# Patient Record
Sex: FEMALE | Race: WHITE | NOT HISPANIC OR LATINO | Employment: FULL TIME | URBAN - METROPOLITAN AREA
[De-identification: names, ages, dates, MRNs, and addresses within clinical notes are randomized per-mention and may not be internally consistent; named-entity substitution may affect disease eponyms.]

---

## 2023-12-16 ENCOUNTER — APPOINTMENT (OUTPATIENT)
Dept: OTHER | Facility: HOSPITAL | Age: 63
End: 2023-12-16
Payer: COMMERCIAL

## 2023-12-16 ENCOUNTER — APPOINTMENT (OUTPATIENT)
Dept: GENERAL RADIOLOGY | Facility: HOSPITAL | Age: 63
End: 2023-12-16
Payer: COMMERCIAL

## 2023-12-16 ENCOUNTER — HOSPITAL ENCOUNTER (INPATIENT)
Facility: HOSPITAL | Age: 63
LOS: 10 days | Discharge: HOME | End: 2023-12-26
Attending: STUDENT IN AN ORGANIZED HEALTH CARE EDUCATION/TRAINING PROGRAM | Admitting: STUDENT IN AN ORGANIZED HEALTH CARE EDUCATION/TRAINING PROGRAM
Payer: COMMERCIAL

## 2023-12-16 DIAGNOSIS — K57.80 PERFORATED DIVERTICULUM: Primary | ICD-10-CM

## 2023-12-16 DIAGNOSIS — E28.9 OVARIAN DYSFUNCTION, UNSPECIFIED: ICD-10-CM

## 2023-12-16 PROBLEM — N94.89 TUBO-OVARIAN MASS: Status: ACTIVE | Noted: 2023-12-16

## 2023-12-16 PROBLEM — E07.9 DISEASE OF THYROID GLAND: Status: ACTIVE | Noted: 2023-12-16

## 2023-12-16 PROBLEM — F39 MOOD DISORDER: Status: ACTIVE | Noted: 2023-12-16

## 2023-12-16 PROBLEM — I10 HYPERTENSION: Status: ACTIVE | Noted: 2023-12-16

## 2023-12-16 LAB
ABO GROUP BLD: NORMAL
ALBUMIN SERPL-MCNC: 3 G/DL (ref 3.5–5.2)
ALBUMIN/GLOB SERPL: 1 G/DL
ALP SERPL-CCNC: 107 U/L (ref 39–117)
ALT SERPL W P-5'-P-CCNC: 8 U/L (ref 1–33)
ANION GAP SERPL CALCULATED.3IONS-SCNC: 10 MMOL/L (ref 5–15)
AST SERPL-CCNC: 12 U/L (ref 1–32)
BACTERIA UR QL AUTO: ABNORMAL /HPF
BASOPHILS # BLD AUTO: 0.1 10*3/MM3 (ref 0–0.2)
BASOPHILS NFR BLD AUTO: 0.3 % (ref 0–1.5)
BILIRUB SERPL-MCNC: 0.4 MG/DL (ref 0–1.2)
BILIRUB UR QL STRIP: NEGATIVE
BLD GP AB SCN SERPL QL: NEGATIVE
BUN SERPL-MCNC: 10 MG/DL (ref 8–23)
BUN/CREAT SERPL: 14.7 (ref 7–25)
C TRACH RRNA CVX QL NAA+PROBE: NOT DETECTED
CALCIUM SPEC-SCNC: 8.8 MG/DL (ref 8.6–10.5)
CHLORIDE SERPL-SCNC: 99 MMOL/L (ref 98–107)
CLARITY UR: ABNORMAL
CO2 SERPL-SCNC: 28 MMOL/L (ref 22–29)
COLOR UR: YELLOW
CREAT SERPL-MCNC: 0.68 MG/DL (ref 0.57–1)
DEPRECATED RDW RBC AUTO: 47.7 FL (ref 37–54)
EGFRCR SERPLBLD CKD-EPI 2021: 98 ML/MIN/1.73
EOSINOPHIL # BLD AUTO: 0.1 10*3/MM3 (ref 0–0.4)
EOSINOPHIL NFR BLD AUTO: 0.5 % (ref 0.3–6.2)
ERYTHROCYTE [DISTWIDTH] IN BLOOD BY AUTOMATED COUNT: 14.4 % (ref 12.3–15.4)
GLOBULIN UR ELPH-MCNC: 3.1 GM/DL
GLUCOSE SERPL-MCNC: 83 MG/DL (ref 65–99)
GLUCOSE UR STRIP-MCNC: NEGATIVE MG/DL
HCT VFR BLD AUTO: 24 % (ref 34–46.6)
HGB BLD-MCNC: 7.9 G/DL (ref 12–15.9)
HGB UR QL STRIP.AUTO: ABNORMAL
HYALINE CASTS UR QL AUTO: ABNORMAL /LPF
KETONES UR QL STRIP: ABNORMAL
LEUKOCYTE ESTERASE UR QL STRIP.AUTO: ABNORMAL
LYMPHOCYTES # BLD AUTO: 1.8 10*3/MM3 (ref 0.7–3.1)
LYMPHOCYTES NFR BLD AUTO: 8.1 % (ref 19.6–45.3)
MCH RBC QN AUTO: 31 PG (ref 26.6–33)
MCHC RBC AUTO-ENTMCNC: 33 G/DL (ref 31.5–35.7)
MCV RBC AUTO: 94.1 FL (ref 79–97)
MONOCYTES # BLD AUTO: 1.8 10*3/MM3 (ref 0.1–0.9)
MONOCYTES NFR BLD AUTO: 8 % (ref 5–12)
N GONORRHOEA RRNA SPEC QL NAA+PROBE: NOT DETECTED
NEUTROPHILS NFR BLD AUTO: 18.7 10*3/MM3 (ref 1.7–7)
NEUTROPHILS NFR BLD AUTO: 83.1 % (ref 42.7–76)
NITRITE UR QL STRIP: NEGATIVE
NRBC BLD AUTO-RTO: 0 /100 WBC (ref 0–0.2)
PH UR STRIP.AUTO: 6 [PH] (ref 5–8)
PLATELET # BLD AUTO: 367 10*3/MM3 (ref 140–450)
PMV BLD AUTO: 6.6 FL (ref 6–12)
POTASSIUM SERPL-SCNC: 3.7 MMOL/L (ref 3.5–5.2)
PROT SERPL-MCNC: 6.1 G/DL (ref 6–8.5)
PROT UR QL STRIP: ABNORMAL
RBC # BLD AUTO: 2.55 10*6/MM3 (ref 3.77–5.28)
RBC # UR STRIP: ABNORMAL /HPF
REF LAB TEST METHOD: ABNORMAL
RH BLD: POSITIVE
SODIUM SERPL-SCNC: 137 MMOL/L (ref 136–145)
SP GR UR STRIP: 1.03 (ref 1–1.03)
SQUAMOUS #/AREA URNS HPF: ABNORMAL /HPF
T&S EXPIRATION DATE: NORMAL
TRICHOMONAS VAGINALIS PCR: NOT DETECTED
UROBILINOGEN UR QL STRIP: ABNORMAL
WBC # UR STRIP: ABNORMAL /HPF
WBC NRBC COR # BLD AUTO: 22.5 10*3/MM3 (ref 3.4–10.8)

## 2023-12-16 PROCEDURE — 99222 1ST HOSP IP/OBS MODERATE 55: CPT | Performed by: STUDENT IN AN ORGANIZED HEALTH CARE EDUCATION/TRAINING PROGRAM

## 2023-12-16 PROCEDURE — 81001 URINALYSIS AUTO W/SCOPE: CPT | Performed by: STUDENT IN AN ORGANIZED HEALTH CARE EDUCATION/TRAINING PROGRAM

## 2023-12-16 PROCEDURE — 85025 COMPLETE CBC W/AUTO DIFF WBC: CPT | Performed by: STUDENT IN AN ORGANIZED HEALTH CARE EDUCATION/TRAINING PROGRAM

## 2023-12-16 PROCEDURE — 25010000002 HEPARIN (PORCINE) PER 1000 UNITS: Performed by: STUDENT IN AN ORGANIZED HEALTH CARE EDUCATION/TRAINING PROGRAM

## 2023-12-16 PROCEDURE — 86900 BLOOD TYPING SEROLOGIC ABO: CPT

## 2023-12-16 PROCEDURE — 87086 URINE CULTURE/COLONY COUNT: CPT | Performed by: STUDENT IN AN ORGANIZED HEALTH CARE EDUCATION/TRAINING PROGRAM

## 2023-12-16 PROCEDURE — 86901 BLOOD TYPING SEROLOGIC RH(D): CPT

## 2023-12-16 PROCEDURE — 25810000003 LACTATED RINGERS SOLUTION: Performed by: STUDENT IN AN ORGANIZED HEALTH CARE EDUCATION/TRAINING PROGRAM

## 2023-12-16 PROCEDURE — 80053 COMPREHEN METABOLIC PANEL: CPT | Performed by: STUDENT IN AN ORGANIZED HEALTH CARE EDUCATION/TRAINING PROGRAM

## 2023-12-16 PROCEDURE — 86901 BLOOD TYPING SEROLOGIC RH(D): CPT | Performed by: STUDENT IN AN ORGANIZED HEALTH CARE EDUCATION/TRAINING PROGRAM

## 2023-12-16 PROCEDURE — 86850 RBC ANTIBODY SCREEN: CPT | Performed by: STUDENT IN AN ORGANIZED HEALTH CARE EDUCATION/TRAINING PROGRAM

## 2023-12-16 PROCEDURE — 87591 N.GONORRHOEAE DNA AMP PROB: CPT | Performed by: STUDENT IN AN ORGANIZED HEALTH CARE EDUCATION/TRAINING PROGRAM

## 2023-12-16 PROCEDURE — 25810000003 SODIUM CHLORIDE 0.9 % SOLUTION: Performed by: STUDENT IN AN ORGANIZED HEALTH CARE EDUCATION/TRAINING PROGRAM

## 2023-12-16 PROCEDURE — 86900 BLOOD TYPING SEROLOGIC ABO: CPT | Performed by: STUDENT IN AN ORGANIZED HEALTH CARE EDUCATION/TRAINING PROGRAM

## 2023-12-16 PROCEDURE — 25010000002 PIPERACILLIN SOD-TAZOBACTAM PER 1 G: Performed by: STUDENT IN AN ORGANIZED HEALTH CARE EDUCATION/TRAINING PROGRAM

## 2023-12-16 PROCEDURE — 87661 TRICHOMONAS VAGINALIS AMPLIF: CPT | Performed by: STUDENT IN AN ORGANIZED HEALTH CARE EDUCATION/TRAINING PROGRAM

## 2023-12-16 PROCEDURE — 74018 RADEX ABDOMEN 1 VIEW: CPT

## 2023-12-16 PROCEDURE — 87491 CHLMYD TRACH DNA AMP PROBE: CPT | Performed by: STUDENT IN AN ORGANIZED HEALTH CARE EDUCATION/TRAINING PROGRAM

## 2023-12-16 RX ORDER — SODIUM CHLORIDE 0.9 % (FLUSH) 0.9 %
10 SYRINGE (ML) INJECTION AS NEEDED
Status: DISCONTINUED | OUTPATIENT
Start: 2023-12-16 | End: 2023-12-26 | Stop reason: HOSPADM

## 2023-12-16 RX ORDER — ESCITALOPRAM OXALATE 20 MG/1
20 TABLET ORAL DAILY
COMMUNITY

## 2023-12-16 RX ORDER — HEPARIN SODIUM 5000 [USP'U]/ML
5000 INJECTION, SOLUTION INTRAVENOUS; SUBCUTANEOUS EVERY 8 HOURS SCHEDULED
Status: DISCONTINUED | OUTPATIENT
Start: 2023-12-16 | End: 2023-12-17

## 2023-12-16 RX ORDER — SODIUM CHLORIDE 9 MG/ML
40 INJECTION, SOLUTION INTRAVENOUS AS NEEDED
Status: DISCONTINUED | OUTPATIENT
Start: 2023-12-16 | End: 2023-12-22

## 2023-12-16 RX ORDER — BISACODYL 5 MG/1
5 TABLET, DELAYED RELEASE ORAL DAILY PRN
Status: DISCONTINUED | OUTPATIENT
Start: 2023-12-16 | End: 2023-12-26 | Stop reason: HOSPADM

## 2023-12-16 RX ORDER — LEVOTHYROXINE SODIUM 0.03 MG/1
25 TABLET ORAL
COMMUNITY

## 2023-12-16 RX ORDER — LEVOTHYROXINE SODIUM 0.03 MG/1
25 TABLET ORAL
Status: DISCONTINUED | OUTPATIENT
Start: 2023-12-16 | End: 2023-12-26 | Stop reason: HOSPADM

## 2023-12-16 RX ORDER — NITROGLYCERIN 0.4 MG/1
0.4 TABLET SUBLINGUAL
Status: DISCONTINUED | OUTPATIENT
Start: 2023-12-16 | End: 2023-12-26 | Stop reason: HOSPADM

## 2023-12-16 RX ORDER — ONDANSETRON 2 MG/ML
4 INJECTION INTRAMUSCULAR; INTRAVENOUS EVERY 6 HOURS PRN
Status: DISCONTINUED | OUTPATIENT
Start: 2023-12-16 | End: 2023-12-26 | Stop reason: HOSPADM

## 2023-12-16 RX ORDER — BISACODYL 10 MG
10 SUPPOSITORY, RECTAL RECTAL DAILY PRN
Status: DISCONTINUED | OUTPATIENT
Start: 2023-12-16 | End: 2023-12-23

## 2023-12-16 RX ORDER — SODIUM CHLORIDE 9 MG/ML
125 INJECTION, SOLUTION INTRAVENOUS CONTINUOUS
Status: DISCONTINUED | OUTPATIENT
Start: 2023-12-16 | End: 2023-12-18

## 2023-12-16 RX ORDER — ONDANSETRON 4 MG/1
4 TABLET, FILM COATED ORAL EVERY 6 HOURS PRN
Status: DISCONTINUED | OUTPATIENT
Start: 2023-12-16 | End: 2023-12-26 | Stop reason: HOSPADM

## 2023-12-16 RX ORDER — LISINOPRIL 10 MG/1
10 TABLET ORAL DAILY
COMMUNITY

## 2023-12-16 RX ORDER — AMOXICILLIN 250 MG
2 CAPSULE ORAL 2 TIMES DAILY
Status: DISCONTINUED | OUTPATIENT
Start: 2023-12-16 | End: 2023-12-26 | Stop reason: HOSPADM

## 2023-12-16 RX ORDER — PANTOPRAZOLE SODIUM 40 MG/10ML
40 INJECTION, POWDER, LYOPHILIZED, FOR SOLUTION INTRAVENOUS
Status: DISCONTINUED | OUTPATIENT
Start: 2023-12-16 | End: 2023-12-18

## 2023-12-16 RX ORDER — LISINOPRIL 5 MG/1
10 TABLET ORAL DAILY
Status: DISCONTINUED | OUTPATIENT
Start: 2023-12-16 | End: 2023-12-26 | Stop reason: HOSPADM

## 2023-12-16 RX ORDER — POLYETHYLENE GLYCOL 3350 17 G/17G
17 POWDER, FOR SOLUTION ORAL DAILY PRN
Status: DISCONTINUED | OUTPATIENT
Start: 2023-12-16 | End: 2023-12-26 | Stop reason: HOSPADM

## 2023-12-16 RX ORDER — ACETAMINOPHEN 325 MG/1
650 TABLET ORAL EVERY 4 HOURS PRN
Status: DISCONTINUED | OUTPATIENT
Start: 2023-12-16 | End: 2023-12-26 | Stop reason: HOSPADM

## 2023-12-16 RX ORDER — OXYCODONE HYDROCHLORIDE 5 MG/1
5 TABLET ORAL EVERY 4 HOURS PRN
Status: DISCONTINUED | OUTPATIENT
Start: 2023-12-16 | End: 2023-12-22

## 2023-12-16 RX ORDER — KETOROLAC TROMETHAMINE 30 MG/ML
15 INJECTION, SOLUTION INTRAMUSCULAR; INTRAVENOUS EVERY 6 HOURS PRN
Status: ACTIVE | OUTPATIENT
Start: 2023-12-16 | End: 2023-12-21

## 2023-12-16 RX ORDER — SODIUM CHLORIDE 0.9 % (FLUSH) 0.9 %
10 SYRINGE (ML) INJECTION EVERY 12 HOURS SCHEDULED
Status: DISCONTINUED | OUTPATIENT
Start: 2023-12-16 | End: 2023-12-26 | Stop reason: HOSPADM

## 2023-12-16 RX ORDER — ESCITALOPRAM OXALATE 10 MG/1
20 TABLET ORAL DAILY
Status: DISCONTINUED | OUTPATIENT
Start: 2023-12-16 | End: 2023-12-26 | Stop reason: HOSPADM

## 2023-12-16 RX ADMIN — SODIUM CHLORIDE 100 ML/HR: 9 INJECTION, SOLUTION INTRAVENOUS at 02:01

## 2023-12-16 RX ADMIN — SENNOSIDES AND DOCUSATE SODIUM 2 TABLET: 8.6; 5 TABLET ORAL at 21:50

## 2023-12-16 RX ADMIN — Medication 10 ML: at 02:02

## 2023-12-16 RX ADMIN — PIPERACILLIN AND TAZOBACTAM 3.38 G: 3; .375 INJECTION, POWDER, FOR SOLUTION INTRAVENOUS at 08:44

## 2023-12-16 RX ADMIN — PIPERACILLIN AND TAZOBACTAM 3.38 G: 3; .375 INJECTION, POWDER, FOR SOLUTION INTRAVENOUS at 02:02

## 2023-12-16 RX ADMIN — PANTOPRAZOLE SODIUM 40 MG: 40 INJECTION, POWDER, FOR SOLUTION INTRAVENOUS at 05:43

## 2023-12-16 RX ADMIN — SODIUM CHLORIDE, POTASSIUM CHLORIDE, SODIUM LACTATE AND CALCIUM CHLORIDE 1000 ML: 600; 310; 30; 20 INJECTION, SOLUTION INTRAVENOUS at 09:12

## 2023-12-16 RX ADMIN — LEVOTHYROXINE SODIUM 25 MCG: 25 TABLET ORAL at 06:27

## 2023-12-16 RX ADMIN — HEPARIN SODIUM 5000 UNITS: 5000 INJECTION INTRAVENOUS; SUBCUTANEOUS at 06:27

## 2023-12-16 RX ADMIN — PIPERACILLIN AND TAZOBACTAM 3.38 G: 3; .375 INJECTION, POWDER, FOR SOLUTION INTRAVENOUS at 17:08

## 2023-12-16 RX ADMIN — SENNOSIDES AND DOCUSATE SODIUM 2 TABLET: 8.6; 5 TABLET ORAL at 08:44

## 2023-12-16 RX ADMIN — OXYCODONE HYDROCHLORIDE 5 MG: 5 TABLET ORAL at 02:01

## 2023-12-16 RX ADMIN — OXYCODONE HYDROCHLORIDE 5 MG: 5 TABLET ORAL at 12:33

## 2023-12-16 RX ADMIN — Medication 10 ML: at 21:54

## 2023-12-16 RX ADMIN — SODIUM CHLORIDE 125 ML/HR: 9 INJECTION, SOLUTION INTRAVENOUS at 21:54

## 2023-12-16 RX ADMIN — PIPERACILLIN AND TAZOBACTAM 3.38 G: 3; .375 INJECTION, POWDER, FOR SOLUTION INTRAVENOUS at 23:51

## 2023-12-16 RX ADMIN — HEPARIN SODIUM 5000 UNITS: 5000 INJECTION INTRAVENOUS; SUBCUTANEOUS at 21:50

## 2023-12-16 RX ADMIN — ESCITALOPRAM OXALATE 20 MG: 10 TABLET ORAL at 08:44

## 2023-12-16 RX ADMIN — HEPARIN SODIUM 5000 UNITS: 5000 INJECTION INTRAVENOUS; SUBCUTANEOUS at 17:08

## 2023-12-16 NOTE — PLAN OF CARE
Problem: Adult Inpatient Plan of Care  Goal: Plan of Care Review  Outcome: Ongoing, Progressing  Goal: Patient-Specific Goal (Individualized)  Outcome: Ongoing, Progressing  Goal: Absence of Hospital-Acquired Illness or Injury  Outcome: Ongoing, Progressing  Goal: Optimal Comfort and Wellbeing  Outcome: Ongoing, Progressing  Goal: Readiness for Transition of Care  Outcome: Ongoing, Progressing  Intervention: Mutually Develop Transition Plan  Recent Flowsheet Documentation  Taken 12/16/2023 0027 by Belinda Jacinto RN  Transportation Anticipated: family or friend will provide  Patient/Family Anticipated Services at Transition: none  Patient/Family Anticipates Transition to: home with family  Taken 12/16/2023 0026 by Belinda Jacinto, RN  Equipment Currently Used at Home: none   Goal Outcome Evaluation:

## 2023-12-16 NOTE — CONSULTS
Colorectal Surgery Consultation Note    ID:  Charity Mcclure;   : 1960  DATE OF VISIT: 2023    Chief Complaint  No chief complaint on file.       History of Present Illness  Charity Mcclure is a 63 y.o. female who I was asked to see in consultation by Dr Vicente Steele to evaluate for abdominal pain.    Patient states she since her hip replacement two weeks, she has been having abdominal pain. Pain is localized in the left lower quadrant radiating to right lower quadrant. She never had  prior episodes of diverticulitis. Patient denies any urinary symptoms. Patient has not had an episode of complicated diverticulitis requiring IR drainage or hospital admission.     Patient states she has a bowel movement 1-2 per day. It is hard in consistency. Patient has no bleeding with stools; she has pain with bowel movements; she has no itching;  she has no protrusion of tissue while straining.    Patient does not take supplemental fiber.    Last colonoscopy: 13 years ago. No family history of colon or rectal cancer    Upon admission, she had hypotension to systolic of 80. Has not been tachycardic. She has been afebrile. She was noted to have elevated leukocytosis to  22.50. CT, per charts, demonstrates diverticulitis with perforation and abscess formation.      Past Medical History  Past Medical History:   Diagnosis Date    Disease of thyroid gland 2023    Hypertension 2023     Past Surgical History  History reviewed. No pertinent surgical history.  Family History  History reviewed. No pertinent family history.  No colorectal cancer history in immediate family members.  Social History  Social History     Tobacco Use    Smoking status: Every Day     Packs/day: 1     Types: Cigarettes    Smokeless tobacco: Never   Vaping Use    Vaping Use: Never used   Substance Use Topics    Alcohol use: Never    Drug use: Never     For further details please see Health History Questionnaire scanned in Epic.  Medication  List    Current Facility-Administered Medications:     acetaminophen (TYLENOL) tablet 650 mg, 650 mg, Oral, Q4H PRN, Kleber Steele MD    sennosides-docusate (PERICOLACE) 8.6-50 MG per tablet 2 tablet, 2 tablet, Oral, BID **AND** polyethylene glycol (MIRALAX) packet 17 g, 17 g, Oral, Daily PRN **AND** bisacodyl (DULCOLAX) EC tablet 5 mg, 5 mg, Oral, Daily PRN **AND** bisacodyl (DULCOLAX) suppository 10 mg, 10 mg, Rectal, Daily PRN, Kleber Steele MD    Calcium Replacement - Follow Nurse / BPA Driven Protocol, , Does not apply, PRN, Kleber Steele MD    escitalopram (LEXAPRO) tablet 20 mg, 20 mg, Oral, Daily, Kleber Steele MD    heparin (porcine) 5000 UNIT/ML injection 5,000 Units, 5,000 Units, Subcutaneous, Q8H, Kleber Steele MD, 5,000 Units at 12/16/23 0627    HYDROmorphone (DILAUDID) injection 1 mg, 1 mg, Intravenous, Q2H PRN, Kleber Steele MD    levothyroxine (SYNTHROID, LEVOTHROID) tablet 25 mcg, 25 mcg, Oral, Q AM, Kleber Steele MD, 25 mcg at 12/16/23 0627    lisinopril (PRINIVIL,ZESTRIL) tablet 10 mg, 10 mg, Oral, Daily, Kleber Steele MD    Magnesium Standard Dose Replacement - Follow Nurse / BPA Driven Protocol, , Does not apply, PRN, Kleber Steele MD    nitroglycerin (NITROSTAT) SL tablet 0.4 mg, 0.4 mg, Sublingual, Q5 Min PRN, Kleber Steele MD    ondansetron (ZOFRAN) tablet 4 mg, 4 mg, Oral, Q6H PRN **OR** ondansetron (ZOFRAN) injection 4 mg, 4 mg, Intravenous, Q6H PRN, Kleber Steele MD    oxyCODONE (ROXICODONE) immediate release tablet 5 mg, 5 mg, Oral, Q4H PRN, Kleber Steele MD, 5 mg at 12/16/23 0201    pantoprazole (PROTONIX) injection 40 mg, 40 mg, Intravenous, Q AM, Kleber Steele MD, 40 mg at 12/16/23 0543    Phosphorus Replacement - Follow Nurse / BPA Driven Protocol, , Does not apply, Ivette CASTILLO Stuart, MD    piperacillin-tazobactam (ZOSYN) IVPB 3.375 g in 100 mL NS (CD), 3.375 g, Intravenous, Q8H, Kleber Steele MD    Potassium Replacement - Follow Nurse / BPA Driven Protocol, , Does not apply, Ivette CASTILLO Stuart, MD    sodium chloride  0.9 % flush 10 mL, 10 mL, Intravenous, Q12H, Kleber Steele MD, 10 mL at 12/16/23 0202    sodium chloride 0.9 % flush 10 mL, 10 mL, Intravenous, PRN, Kleber Steele MD    sodium chloride 0.9 % infusion 40 mL, 40 mL, Intravenous, PRN, Kleber Steele MD    sodium chloride 0.9 % infusion, 100 mL/hr, Intravenous, Continuous, Kleber Steele MD, Last Rate: 100 mL/hr at 12/16/23 0201, 100 mL/hr at 12/16/23 0201   Allergies  No Known Allergies  Review of Systems  All systems reviewed and are otherwise negative, pertinent positives noted in the HPI and Health History Questionnaire scanned in Epic.    Physical Exam  General:  No acute distress  Head: Normocephalic, atraumatic  Neuro: Alert and oriented    Abdomen:  Soft, moderate tenderness in the low abdomen especially on the left side, no diffuse peritoneal signs, non-distended, no masses, no hernias, no hepatomegaly, no splenomegaly. No abnormal, audible bowel sounds.      Assessment  - 62 yo with hypotensive, diverticulitis and abscess collection     Plan / Recommendations    1 unable to review her CT scan from outside facility, awaiting images to be uploaded. Currently hypotensive w/o tachycardia. Non sick appearing.     2. 1L bolus given. Unsure if this due to anemia vs sepsis. Reports no blood with BM. Possible from her recent orthopedic surgery. Currently she not on anticoagulation. I have sent type and scree for possible need for transfusion.    3. I have obtained A-xray, no free air     4. No diffuse peritonitis.Responsive to fluids.Continue with serial abdominal exam, hold off on emergent surgical intervention unless clinically worsens    5. Continue with IV antibiotics    6 ok for clear liquid diet       7. I have also personally reviewed her laboratory that is remarkable for anemia with Hgb of 7.9 and elevated WBC to 22.     I have personally reviewed all physician notes related to this patient which can be summarized as patient having diverticulitis with an abscess .    8.  I will continue to f/u along       Harvinder Vasquez MD   Colon and Rectal Surgery   [facility]

## 2023-12-16 NOTE — PLAN OF CARE
Problem: Adult Inpatient Plan of Care  Goal: Plan of Care Review  12/16/2023 0605 by Belinda Jacinto RN  Outcome: Ongoing, Progressing  12/16/2023 0508 by Belinda Jacinto RN  Outcome: Ongoing, Progressing  Goal: Patient-Specific Goal (Individualized)  12/16/2023 0605 by Belinda Jacinto RN  Outcome: Ongoing, Progressing  12/16/2023 0508 by Belinda Jacinto RN  Outcome: Ongoing, Progressing  Goal: Absence of Hospital-Acquired Illness or Injury  12/16/2023 0605 by Belinda Jacinto RN  Outcome: Ongoing, Progressing  12/16/2023 0508 by Belinda Jacinto RN  Outcome: Ongoing, Progressing  Intervention: Identify and Manage Fall Risk  Recent Flowsheet Documentation  Taken 12/16/2023 0510 by Belinda Jacinto RN  Safety Promotion/Fall Prevention:   activity supervised   clutter free environment maintained   fall prevention program maintained   lighting adjusted   room organization consistent   safety round/check completed  Taken 12/16/2023 0400 by Belinda Jacinto RN  Safety Promotion/Fall Prevention:   clutter free environment maintained   fall prevention program maintained   lighting adjusted   room organization consistent   safety round/check completed  Intervention: Prevent Infection  Recent Flowsheet Documentation  Taken 12/16/2023 0510 by Belinda Jacinto RN  Infection Prevention:   hand hygiene promoted   rest/sleep promoted  Taken 12/16/2023 0400 by Belinda Jacinto RN  Infection Prevention:   hand hygiene promoted   rest/sleep promoted  Goal: Optimal Comfort and Wellbeing  12/16/2023 0605 by Belinda Jacinto RN  Outcome: Ongoing, Progressing  12/16/2023 0508 by Belinda Jacinto RN  Outcome: Ongoing, Progressing  Goal: Readiness for Transition of Care  12/16/2023 0605 by Belinda Jacinto RN  Outcome: Ongoing, Progressing  12/16/2023 0508 by Belinda Jacinto RN  Outcome: Ongoing, Progressing  Intervention: Mutually Develop Transition Plan  Recent Flowsheet Documentation  Taken 12/16/2023 0027 by  Belinda Jacinto, RN  Transportation Anticipated: family or friend will provide  Patient/Family Anticipated Services at Transition: none  Patient/Family Anticipates Transition to: home with family  Taken 12/16/2023 0026 by Belinda Jacinto, RN  Equipment Currently Used at Home: none   Goal Outcome Evaluation:

## 2023-12-16 NOTE — CONSULTS
Reason for Consultation: [facility]    Patient Care Team:  Meron Whittaker MD as PCP - General (Internal Medicine)    Chief complaint lower abdominal pain    Subjective .     History of present illness:  62 yo  who presents from [place] [facility] with diverticulitis and abscess collection. X-ray here shows no free air. Also mentioned on CT from [place] was a possible left ovarian or tubo-ovarian abscess.    Objective     Vital Signs   Vitals:    23 0850 23 1035 23 1226 23 1239   BP: (!) 85/58 108/68 101/69 101/63   BP Location: Right arm   Right arm   Patient Position: Lying   Lying   Pulse: 81 78 77 82   Resp: 22 18 20 14   Temp: 98.3 °F (36.8 °C)   99.4 °F (37.4 °C)   TempSrc: Oral   Axillary   SpO2: 95% 93% 95% 97%   Weight:       Height:         Temp (24hrs), Av.5 °F (36.9 °C), Min:98 °F (36.7 °C), Max:99.4 °F (37.4 °C)      Physical Exam:       General Appearance:    Alert, cooperative, in no acute distress   Head:    Normocephalic, without obvious abnormality, atraumatic   Eyes:         PERRLA   Abdomen:     Soft, non-distended, mild tenderness lower abdomen     Lab Results:  Lab Results (last 48 hours)       Procedure Component Value Units Date/Time    Chlamydia trachomatis, Neisseria gonorrhoeae, Trichomonas vaginalis, PCR - Urine, Urine, Clean Catch [[id number]]  (Normal) Collected: 23 0548    Specimen: Urine, Clean Catch Updated: 23 08     Chlamydia DNA by PCR Not Detected     Neisseria gonorrhoeae by PCR Not Detected     Trichomonas vaginalis PCR Not Detected    Urinalysis, Microscopic Only - Urine, Clean Catch [[id number]]  (Abnormal) Collected: 23 0549    Specimen: Urine, Clean Catch Updated: 23 0702     RBC, UA 3-5 /HPF      WBC, UA 21-50 /HPF      Bacteria, UA 2+ /HPF      Squamous Epithelial Cells, UA 3-6 /HPF      Hyaline Casts, UA None Seen /LPF      Methodology Manual Light Microscopy    Urine Culture - Urine, Urine, Clean Catch [[id number]] Collected:  12/16/23 0549    Specimen: Urine, Clean Catch Updated: 12/16/23 0702    Urinalysis With Culture If Indicated - Urine, Clean Catch [[id number]]  (Abnormal) Collected: 12/16/23 0549    Specimen: Urine, Clean Catch Updated: 12/16/23 0643     Color, UA Yellow     Appearance, UA Cloudy     pH, UA 6.0     Specific Gravity, UA 1.032     Glucose, UA Negative     Ketones, UA Trace     Bilirubin, UA Negative     Blood, UA Small (1+)     Protein, UA Trace     Leuk Esterase, UA Small (1+)     Nitrite, UA Negative     Urobilinogen, UA 1.0 E.U./dL    Narrative:      In absence of clinical symptoms, the presence of pyuria, bacteria, and/or nitrites on the urinalysis result does not correlate with infection.    Comprehensive Metabolic Panel [[id number]]  (Abnormal) Collected: 12/16/23 0525    Specimen: Blood Updated: 12/16/23 0605     Glucose 83 mg/dL      BUN 10 mg/dL      Creatinine 0.68 mg/dL      Sodium 137 mmol/L      Potassium 3.7 mmol/L      Chloride 99 mmol/L      CO2 28.0 mmol/L      Calcium 8.8 mg/dL      Total Protein 6.1 g/dL      Albumin 3.0 g/dL      ALT (SGPT) 8 U/L      AST (SGOT) 12 U/L      Alkaline Phosphatase 107 U/L      Total Bilirubin 0.4 mg/dL      Globulin 3.1 gm/dL      A/G Ratio 1.0 g/dL      BUN/Creatinine Ratio 14.7     Anion Gap 10.0 mmol/L      eGFR 98.0 mL/min/1.73     Narrative:      GFR Normal >60  Chronic Kidney Disease <60  Kidney Failure <15      CBC Auto Differential [[id number]]  (Abnormal) Collected: 12/16/23 0525    Specimen: Blood Updated: 12/16/23 0536     WBC 22.50 10*3/mm3      RBC 2.55 10*6/mm3      Hemoglobin 7.9 g/dL      Hematocrit 24.0 %      MCV 94.1 fL      MCH 31.0 pg      MCHC 33.0 g/dL      RDW 14.4 %      RDW-SD 47.7 fl      MPV 6.6 fL      Platelets 367 10*3/mm3      Neutrophil % 83.1 %      Lymphocyte % 8.1 %      Monocyte % 8.0 %      Eosinophil % 0.5 %      Basophil % 0.3 %      Neutrophils, Absolute 18.70 10*3/mm3      Lymphocytes, Absolute 1.80 10*3/mm3      Monocytes,  Absolute 1.80 10*3/mm3      Eosinophils, Absolute 0.10 10*3/mm3      Basophils, Absolute 0.10 10*3/mm3      nRBC 0.0 /100 WBC             Radiology Results:  Imaging Results (Last 72 Hours)       Procedure Component Value Units Date/Time    XR Abdomen KUB [[id number]] Collected: 12/16/23 0948     Updated: 12/16/23 0952    Narrative:      XR ABDOMEN KUB    Date of Exam: 12/16/2023 8:43 AM CST    Indication: hypotensive    Comparison: None available.    Findings:  Upright radiograph obtained of the abdomen demonstrates a few nonspecific gas fluid levels in the right upper quadrant. No significant small bowel distention noted. Gas is present within the colon. No free intraperitoneal gas noted. The lung bases are   clear. No calcifications overlying the expected location of the kidneys. Right total hip arthroplasty is noted.      Impression:      Impression:  No acute process identified within the abdomen.      Electronically Signed: Tali Mercedes MD    12/16/2023 8:50 AM CST    Workstation ID: [id number]    CT Outside Films [[id number]] Resulted: 12/16/23 0849     Updated: 12/16/23 0849    Narrative:      This procedure was auto-finalized with no dictation required.            Assessment & Plan     Principal Problem:    Perforated diverticulum  Active Problems:    Mood disorder    Hypertension    Disease of thyroid gland    Tubo-ovarian mass      Currently on abx. Will follow. If surgery is indicated, I am available to evaluate possible TOA if needed.    I discussed the patients findings and my recommendations with patient    Suzette Robert MD  12/16/23  16:23 EST

## 2023-12-16 NOTE — H&P
[facility] Medicine Services  History & Physical    Patient Name: Charity Mcclure  : 1960  MRN: [id number]  Primary Care Physician:  Meron Whittaker MD  Date of admission: 2023  Date and Time of Service: 2023 at 0100    Subjective      Chief Complaint: Abdominal Pain    History of Present Illness: Charity Mcclure is a 63 y.o. female with a CMH of HTN, hypothyroidism, right hip repair who presented to [facility] on 2023 with abdominal pain.    Patient states starting last week, she started having abdominal pain, fevers, chills, myalgias.  She initially thought she had the flu, but then she started developing diarrhea about 2-3 days ago, and then she has now started having issues with PO intake.  She presented to [facility] ED where they scanned her abdomen and found she had a perforated diverticulum.  They also found a tubo-ovarian mass, which they suspected was an abscess.  Given these findings, gynecology at [facility] was consulted and they felt that if colorectal would be on board, that it would be possible to manage this patient here.  [place] did agree to be consulted, so transfer was accepted.      Review of Systems   Constitutional:  Negative for chills and fever.   HENT:  Negative for congestion and rhinorrhea.    Eyes:  Negative for visual disturbance.   Respiratory:  Negative for shortness of breath.    Cardiovascular:  Negative for chest pain.   Gastrointestinal:  Positive for abdominal pain, diarrhea and nausea. Negative for vomiting.   Endocrine: Negative for polyuria.   Genitourinary:  Negative for difficulty urinating and dyspareunia.   Musculoskeletal:  Negative for back pain and myalgias.   Skin:  Negative for rash and wound.   Neurological:  Negative for weakness, numbness and headaches.   Psychiatric/Behavioral:  Negative for agitation, behavioral problems and confusion.        Personal History     Past Medical History:   Diagnosis Date     Disease of thyroid gland 12/16/2023    Hypertension 12/16/2023       History reviewed. No pertinent surgical history.    Family History: family history is not on file. Otherwise pertinent FHx was reviewed and not pertinent to current issue.    Social History:  reports that she has been smoking cigarettes. She has been smoking an average of 1 pack per day. She has never used smokeless tobacco. She reports that she does not drink alcohol and does not use drugs.    Home Medications:  Prior to Admission Medications       Prescriptions Last Dose Informant Patient Reported? Taking?    escitalopram (LEXAPRO) 20 MG tablet Unknown  Yes No    Take 1 tablet by mouth Daily.    levothyroxine (SYNTHROID, LEVOTHROID) 25 MCG tablet Unknown  Yes No    Take 1 tablet by mouth Every Morning.    lisinopril (PRINIVIL,ZESTRIL) 10 MG tablet Unknown  Yes No    Take 1 tablet by mouth Daily.    rivaroxaban (XARELTO) 10 MG tablet Unknown  Yes No    Take 1 tablet by mouth Daily.              Allergies:  No Known Allergies    Objective      Vitals:   Temp:  [98 °F (36.7 °C)] 98 °F (36.7 °C)  Heart Rate:  [76] 76  Resp:  [16] 16  BP: (112)/(68) 112/68  Body mass index is 29.24 kg/m².  Physical Exam  Constitutional:       General: She is not in acute distress.  HENT:      Head: Normocephalic and atraumatic.   Cardiovascular:      Rate and Rhythm: Normal rate and regular rhythm.      Heart sounds: Normal heart sounds.   Pulmonary:      Effort: Pulmonary effort is normal. No respiratory distress.      Breath sounds: Normal breath sounds.   Abdominal:      General: Bowel sounds are normal.      Palpations: Abdomen is soft.      Tenderness: There is abdominal tenderness. There is no rebound.   Musculoskeletal:      Right lower leg: No edema.      Left lower leg: No edema.   Skin:     General: Skin is warm and dry.   Neurological:      Mental Status: She is alert.   Psychiatric:         Mood and Affect: Mood normal.         Behavior: Behavior normal.          Diagnostic Data:  Lab Results (last 24 hours)       ** No results found for the last 24 hours. **             Imaging Results (Last 24 Hours)       ** No results found for the last 24 hours. **              Assessment & Plan        This is a 63 y.o. female with:    Active and Resolved Problems  Active Hospital Problems    Diagnosis  POA    **Perforated diverticulum [K57.80]  Yes    Mood disorder [F39]  Yes    Hypertension [I10]  Yes    Disease of thyroid gland [E07.9]  Yes    Tubo-ovarian mass [N94.89]  Yes      Resolved Hospital Problems   No resolved problems to display.       #Diverticulitis with perforation  From review of the chart, it looks like the patient had recent surgery for her hip and was started on a course of xarelto for DVT ppx.  It may be possible, that this contributed to the perforation, as the timeline does fit.  Overall, patient does not exhibit sepsis criteria.  It is possible that the contents of the perforated diverticulum could have caused a tuboovarian abscess.  - Zosyn IV for now  - Pain control, monitor for toxicity  - Colorectal surgery consulted    #Tuboovarian Mass  The best case scenario is the above.  Other possible etiologies would be STI leading to abscess.  Also possible this could be a coincidental finding of a malignancy.  Was hesitant to take this patient given that malignancy was on the differential, as gyn-onc is not available at our facility, but accepted per hospital policy.  - STD panel  - UA  - Gynecology c/s  - Abx as above    #HTN  HDS.  - Continue home meds    #Hypothyrodism  #Mood Disorder  Stable.  - Continue home meds    DVT prophylaxis:  Medical DVT prophylaxis orders are present.    The patient desires to be as follows:    CODE STATUS:    Level Of Support Discussed With: Patient  Code Status (Patient has no pulse and is not breathing): CPR (Attempt to Resuscitate)  Medical Interventions (Patient has pulse or is breathing): Full Support        Ben  Ren, who can be contacted at [phone], is the designated person to make medical decisions on the patient's behalf if She is incapable of doing so. This was clarified with patient and/or next of kin on 12/16/2023 during the course of this H&P.    Admission Status:  I believe this patient meets inpatient status.    Expected Length of Stay: 3 nights    PDMP and Medication Dispenses via Sidebar reviewed and consistent with patient reported medications.    I discussed the patient's findings and my recommendations with patient and family.      Signature:     This document has been electronically signed by Kleebr Steele MD on December 16, 2023 02:53 [facility] Hospitalist Team

## 2023-12-17 LAB
ALBUMIN SERPL-MCNC: 2.7 G/DL (ref 3.5–5.2)
ALBUMIN/GLOB SERPL: 0.8 G/DL
ALP SERPL-CCNC: 137 U/L (ref 39–117)
ALT SERPL W P-5'-P-CCNC: 11 U/L (ref 1–33)
ANION GAP SERPL CALCULATED.3IONS-SCNC: 13 MMOL/L (ref 5–15)
AST SERPL-CCNC: 18 U/L (ref 1–32)
BACTERIA SPEC AEROBE CULT: NO GROWTH
BASOPHILS # BLD AUTO: 0.1 10*3/MM3 (ref 0–0.2)
BASOPHILS NFR BLD AUTO: 0.3 % (ref 0–1.5)
BILIRUB SERPL-MCNC: 0.4 MG/DL (ref 0–1.2)
BUN SERPL-MCNC: 6 MG/DL (ref 8–23)
BUN/CREAT SERPL: 10 (ref 7–25)
CALCIUM SPEC-SCNC: 8.7 MG/DL (ref 8.6–10.5)
CHLORIDE SERPL-SCNC: 101 MMOL/L (ref 98–107)
CO2 SERPL-SCNC: 25 MMOL/L (ref 22–29)
CREAT SERPL-MCNC: 0.6 MG/DL (ref 0.57–1)
DEPRECATED RDW RBC AUTO: 49.4 FL (ref 37–54)
EGFRCR SERPLBLD CKD-EPI 2021: 101 ML/MIN/1.73
EOSINOPHIL # BLD AUTO: 0.1 10*3/MM3 (ref 0–0.4)
EOSINOPHIL NFR BLD AUTO: 0.4 % (ref 0.3–6.2)
ERYTHROCYTE [DISTWIDTH] IN BLOOD BY AUTOMATED COUNT: 14.5 % (ref 12.3–15.4)
GLOBULIN UR ELPH-MCNC: 3.6 GM/DL
GLUCOSE SERPL-MCNC: 95 MG/DL (ref 65–99)
HCT VFR BLD AUTO: 22.7 % (ref 34–46.6)
HGB BLD-MCNC: 7.4 G/DL (ref 12–15.9)
LYMPHOCYTES # BLD AUTO: 1.9 10*3/MM3 (ref 0.7–3.1)
LYMPHOCYTES NFR BLD AUTO: 10 % (ref 19.6–45.3)
MCH RBC QN AUTO: 30 PG (ref 26.6–33)
MCHC RBC AUTO-ENTMCNC: 32.8 G/DL (ref 31.5–35.7)
MCV RBC AUTO: 91.6 FL (ref 79–97)
MONOCYTES # BLD AUTO: 1.5 10*3/MM3 (ref 0.1–0.9)
MONOCYTES NFR BLD AUTO: 7.6 % (ref 5–12)
NEUTROPHILS NFR BLD AUTO: 15.7 10*3/MM3 (ref 1.7–7)
NEUTROPHILS NFR BLD AUTO: 81.7 % (ref 42.7–76)
NRBC BLD AUTO-RTO: 0.1 /100 WBC (ref 0–0.2)
PLATELET # BLD AUTO: 341 10*3/MM3 (ref 140–450)
PMV BLD AUTO: 7.5 FL (ref 6–12)
POTASSIUM SERPL-SCNC: 3.6 MMOL/L (ref 3.5–5.2)
POTASSIUM SERPL-SCNC: 4.7 MMOL/L (ref 3.5–5.2)
PROT SERPL-MCNC: 6.3 G/DL (ref 6–8.5)
RBC # BLD AUTO: 2.47 10*6/MM3 (ref 3.77–5.28)
SODIUM SERPL-SCNC: 139 MMOL/L (ref 136–145)
WBC NRBC COR # BLD AUTO: 19.2 10*3/MM3 (ref 3.4–10.8)

## 2023-12-17 PROCEDURE — 80053 COMPREHEN METABOLIC PANEL: CPT | Performed by: STUDENT IN AN ORGANIZED HEALTH CARE EDUCATION/TRAINING PROGRAM

## 2023-12-17 PROCEDURE — 25010000002 PIPERACILLIN SOD-TAZOBACTAM PER 1 G: Performed by: STUDENT IN AN ORGANIZED HEALTH CARE EDUCATION/TRAINING PROGRAM

## 2023-12-17 PROCEDURE — 85025 COMPLETE CBC W/AUTO DIFF WBC: CPT | Performed by: STUDENT IN AN ORGANIZED HEALTH CARE EDUCATION/TRAINING PROGRAM

## 2023-12-17 PROCEDURE — 25010000002 HYDROMORPHONE 1 MG/ML SOLUTION: Performed by: STUDENT IN AN ORGANIZED HEALTH CARE EDUCATION/TRAINING PROGRAM

## 2023-12-17 PROCEDURE — 99232 SBSQ HOSP IP/OBS MODERATE 35: CPT | Performed by: STUDENT IN AN ORGANIZED HEALTH CARE EDUCATION/TRAINING PROGRAM

## 2023-12-17 PROCEDURE — 84132 ASSAY OF SERUM POTASSIUM: CPT | Performed by: INTERNAL MEDICINE

## 2023-12-17 RX ORDER — POTASSIUM CHLORIDE 20 MEQ/1
40 TABLET, EXTENDED RELEASE ORAL EVERY 4 HOURS
Status: COMPLETED | OUTPATIENT
Start: 2023-12-17 | End: 2023-12-17

## 2023-12-17 RX ADMIN — SENNOSIDES AND DOCUSATE SODIUM 2 TABLET: 8.6; 5 TABLET ORAL at 08:19

## 2023-12-17 RX ADMIN — ACETAMINOPHEN 650 MG: 325 TABLET, FILM COATED ORAL at 10:00

## 2023-12-17 RX ADMIN — PANTOPRAZOLE SODIUM 40 MG: 40 INJECTION, POWDER, FOR SOLUTION INTRAVENOUS at 05:45

## 2023-12-17 RX ADMIN — HYDROMORPHONE HYDROCHLORIDE 1 MG: 1 INJECTION, SOLUTION INTRAMUSCULAR; INTRAVENOUS; SUBCUTANEOUS at 20:26

## 2023-12-17 RX ADMIN — PIPERACILLIN AND TAZOBACTAM 3.38 G: 3; .375 INJECTION, POWDER, FOR SOLUTION INTRAVENOUS at 17:02

## 2023-12-17 RX ADMIN — Medication 10 ML: at 08:20

## 2023-12-17 RX ADMIN — POTASSIUM CHLORIDE 40 MEQ: 1500 TABLET, EXTENDED RELEASE ORAL at 05:45

## 2023-12-17 RX ADMIN — Medication 10 ML: at 20:26

## 2023-12-17 RX ADMIN — ESCITALOPRAM OXALATE 20 MG: 10 TABLET ORAL at 08:19

## 2023-12-17 RX ADMIN — POTASSIUM CHLORIDE 40 MEQ: 1500 TABLET, EXTENDED RELEASE ORAL at 08:19

## 2023-12-17 RX ADMIN — LEVOTHYROXINE SODIUM 25 MCG: 25 TABLET ORAL at 05:45

## 2023-12-17 RX ADMIN — PIPERACILLIN AND TAZOBACTAM 3.38 G: 3; .375 INJECTION, POWDER, FOR SOLUTION INTRAVENOUS at 08:19

## 2023-12-17 RX ADMIN — HYDROMORPHONE HYDROCHLORIDE 1 MG: 1 INJECTION, SOLUTION INTRAMUSCULAR; INTRAVENOUS; SUBCUTANEOUS at 08:29

## 2023-12-17 NOTE — PROGRESS NOTES
"[facility] MEDICINE SERVICE  DAILY PROGRESS NOTE      Patient Name: Charity Mcclure  Date of Admission: 12/16/2023  Today's Date: 12/17/23  Length of Stay: 1  Primary Care Physician: Meron Whittaker MD    Subjective   Patient is stable today.  Her abdominal pain is now localized to the LLQ.  She has no appetite and is having difficulty eating.  No other complaints.  No overnight events.      Objective    Temp:  [98 °F (36.7 °C)-99.4 °F (37.4 °C)] 98 °F (36.7 °C)  Heart Rate:  [75-90] 90  Resp:  [14-24] 18  BP: ()/(63-71) 112/70  Physical Exam  General: NAD, AAOx3  HEENT: AT NC, EOMI  Neck: No JVD  CVS: S1/S2 present, RRR, no M/R/G  Lungs: CTA B/L  Abdomen: soft, tender at LLQ, ND, BS+  Ext: no edema  Skin: no rashes, bruises or discolorations  Neuro: no focal deficits  Psych: Not agitated       Results Review:  I have reviewed the labs, radiology results, and diagnostic studies.    Laboratory Data:   Results from last 7 days   Lab Units 12/17/23  0129 12/16/23  0525   WBC 10*3/mm3 19.20* 22.50*   HEMOGLOBIN g/dL 7.4* 7.9*   HEMATOCRIT % 22.7* 24.0*   PLATELETS 10*3/mm3 341 367        Results from last 7 days   Lab Units 12/17/23  0129 12/16/23  0525   SODIUM mmol/L 139 137   POTASSIUM mmol/L 3.6 3.7   CHLORIDE mmol/L 101 99   CO2 mmol/L 25.0 28.0   BUN mg/dL 6* 10   CREATININE mg/dL 0.60 0.68   CALCIUM mg/dL 8.7 8.8   BILIRUBIN mg/dL 0.4 0.4   ALK PHOS U/L 137* 107   ALT (SGPT) U/L 11 8   AST (SGOT) U/L 18 12   GLUCOSE mg/dL 95 83       Culture Data:   No results found for: \"BLOODCX\"  No results found for: \"URINECX\"  No results found for: \"RESPCX\"  No results found for: \"WOUNDCX\"  No results found for: \"STOOLCX\"  No components found for: \"BODYFLD\"    Radiology Data:   Imaging Results (Last 24 Hours)       Procedure Component Value Units Date/Time    XR Abdomen KUB [[id number]] Collected: 12/16/23 0948     Updated: 12/16/23 0952    Narrative:      XR ABDOMEN KUB    Date of Exam: 12/16/2023 8:43 AM " CST    Indication: hypotensive    Comparison: None available.    Findings:  Upright radiograph obtained of the abdomen demonstrates a few nonspecific gas fluid levels in the right upper quadrant. No significant small bowel distention noted. Gas is present within the colon. No free intraperitoneal gas noted. The lung bases are   clear. No calcifications overlying the expected location of the kidneys. Right total hip arthroplasty is noted.      Impression:      Impression:  No acute process identified within the abdomen.      Electronically Signed: Tali Mercedes MD    12/16/2023 8:50 AM CST    Workstation ID: [id number]            I have reviewed the patient's current medications.     Assessment/Plan     1) perforated diverticulitis/tubo-ovarian abscess  - seen on imaging  - colorectal surgery on board, appreciate recs  - will need IR drainage of abscess  - OB/GYN consulted, appreciate recs  - zosyn    2) HTN  - home meds    3) hypothyroidism  - synthroid    4) mood disorders  - home meds    5) GI/DVT ppx  - protonix/SCDs        Electronically signed by Dana Josue MD, 12/17/23, 09:02 EST.

## 2023-12-17 NOTE — PROGRESS NOTES
Colorectal Surgery Progress Note    Pt. Name/Age/:  Charity Mcclure   63 y.o.    1960         Med. Record Number:   [id number]  Date of admission:  2023      Service(s): Colorectal Surgery      Subjective    Reports pain slightly better   BP improved after bolus fluid   Continues to have a drift in her Hgb, 7.4 this am   No fever  Leukocytosis improving     Objective  Vitals:     Patient Vitals for the past 24 hrs:   BP Temp Temp src Pulse Resp SpO2   23 0500 106/64 98.4 °F (36.9 °C) -- 77 16 98 %   23 0055 102/71 98.5 °F (36.9 °C) -- 75 22 95 %   23 2100 95/64 98.4 °F (36.9 °C) -- -- 22 90 %   23 1642 97/64 99.1 °F (37.3 °C) Oral 78 24 97 %   23 1239 101/63 99.4 °F (37.4 °C) Axillary 82 14 97 %   23 1226 101/69 -- -- 77 20 95 %   23 1035 108/68 -- -- 78 18 93 %   23 0850 (!) 85/58 98.3 °F (36.8 °C) Oral 81 22 95 %   23 0846 (!) 85/58 -- -- 82 -- --           Wt. Admission: Weight: 70.2 kg (154 lb 12.2 oz)     Wt. Current: Weight: 70.2 kg (154 lb 12.2 oz)   Body mass index is 29.24 kg/m².      I&O:  Intake/Output Summary (Last 24 hours) at 2023 07  Last data filed at 2023 2100  Gross per 24 hour   Intake 600 ml   Output --   Net 600 ml     12/15 1901 -  0700  In: 600 [P.O.:600]  Out: -       Exam  General:  No acute distress, resting comfortably.  Cardiovascular: regular rate.  Respiratory: no increased work of breathing.  Abdomen:  Soft, significantly tender with localized rebound and guarding left lower quadrant     Extremities: warm, well-perfused extremities, no pitting edema.      Scheduled Meds:escitalopram, 20 mg, Oral, Daily  levothyroxine, 25 mcg, Oral, Q AM  lisinopril, 10 mg, Oral, Daily  pantoprazole, 40 mg, Intravenous, Q AM  piperacillin-tazobactam, 3.375 g, Intravenous, Q8H  potassium chloride ER, 40 mEq, Oral, Q4H  senna-docusate sodium, 2 tablet, Oral, BID  sodium chloride, 10 mL, Intravenous,  Q12H      Continuous Infusions:sodium chloride, 125 mL/hr, Last Rate: 125 mL/hr (12/16/23 4884)      PRN Meds:.  acetaminophen    senna-docusate sodium **AND** polyethylene glycol **AND** bisacodyl **AND** bisacodyl    Calcium Replacement - Follow Nurse / BPA Driven Protocol    HYDROmorphone    ketorolac    Magnesium Standard Dose Replacement - Follow Nurse / BPA Driven Protocol    nitroglycerin    ondansetron **OR** ondansetron    oxyCODONE    Phosphorus Replacement - Follow Nurse / BPA Driven Protocol    Potassium Replacement - Follow Nurse / BPA Driven Protocol    sodium chloride    sodium chloride          Assessment  63 y.o. female with diverticulitis and a complex abscess likely tubo-ovarian     Plan / Recommendations    - Currently her blood pressure has normalized and leukocytosis improving. She still has localized peritonitis over the left lower quadrant.   - continue with IV antibiotics   - NPO after 4 am tomorrow to discuss with IR for possible drainage of abscess   - might need transfusion if her blood pressure becomes low or continues to trend down   - hold off on surgery for now   - appreciate gyn recommendation   - ok to continue on clear liquid diet     07:35 EST; 12/17/2023        Harvinder Vasquez MD  Colon and Rectal Surgery   [facility]

## 2023-12-17 NOTE — PLAN OF CARE
Goal Outcome Evaluation:      Pt resting most of the night with home CPAP on. IVF and ATB continued. BP soft, Hgb 7.4- no new orders and potassium replaced. No surgery at this time. Plan of care ongoing.

## 2023-12-18 LAB
ALBUMIN SERPL-MCNC: 2.7 G/DL (ref 3.5–5.2)
ALBUMIN/GLOB SERPL: 0.8 G/DL
ALP SERPL-CCNC: 96 U/L (ref 39–117)
ALT SERPL W P-5'-P-CCNC: 12 U/L (ref 1–33)
ANION GAP SERPL CALCULATED.3IONS-SCNC: 14 MMOL/L (ref 5–15)
AST SERPL-CCNC: 17 U/L (ref 1–32)
BASOPHILS # BLD AUTO: 0 10*3/MM3 (ref 0–0.2)
BASOPHILS NFR BLD AUTO: 0.2 % (ref 0–1.5)
BILIRUB SERPL-MCNC: 0.4 MG/DL (ref 0–1.2)
BUN SERPL-MCNC: 4 MG/DL (ref 8–23)
BUN/CREAT SERPL: 6.5 (ref 7–25)
CALCIUM SPEC-SCNC: 8.7 MG/DL (ref 8.6–10.5)
CHLORIDE SERPL-SCNC: 99 MMOL/L (ref 98–107)
CO2 SERPL-SCNC: 23 MMOL/L (ref 22–29)
CREAT SERPL-MCNC: 0.62 MG/DL (ref 0.57–1)
DEPRECATED RDW RBC AUTO: 52.5 FL (ref 37–54)
EGFRCR SERPLBLD CKD-EPI 2021: 100.2 ML/MIN/1.73
EOSINOPHIL # BLD AUTO: 0 10*3/MM3 (ref 0–0.4)
EOSINOPHIL NFR BLD AUTO: 0.2 % (ref 0.3–6.2)
ERYTHROCYTE [DISTWIDTH] IN BLOOD BY AUTOMATED COUNT: 15.1 % (ref 12.3–15.4)
GLOBULIN UR ELPH-MCNC: 3.5 GM/DL
GLUCOSE SERPL-MCNC: 90 MG/DL (ref 65–99)
HCT VFR BLD AUTO: 23.4 % (ref 34–46.6)
HGB BLD-MCNC: 7.6 G/DL (ref 12–15.9)
INR PPP: 1.14 (ref 0.93–1.1)
LYMPHOCYTES # BLD AUTO: 1.3 10*3/MM3 (ref 0.7–3.1)
LYMPHOCYTES NFR BLD AUTO: 7.2 % (ref 19.6–45.3)
MCH RBC QN AUTO: 30.2 PG (ref 26.6–33)
MCHC RBC AUTO-ENTMCNC: 32.6 G/DL (ref 31.5–35.7)
MCV RBC AUTO: 92.5 FL (ref 79–97)
MONOCYTES # BLD AUTO: 1.2 10*3/MM3 (ref 0.1–0.9)
MONOCYTES NFR BLD AUTO: 6.7 % (ref 5–12)
NEUTROPHILS NFR BLD AUTO: 16 10*3/MM3 (ref 1.7–7)
NEUTROPHILS NFR BLD AUTO: 85.7 % (ref 42.7–76)
NRBC BLD AUTO-RTO: 0.1 /100 WBC (ref 0–0.2)
PLATELET # BLD AUTO: 328 10*3/MM3 (ref 140–450)
PMV BLD AUTO: 7.5 FL (ref 6–12)
POTASSIUM SERPL-SCNC: 4.1 MMOL/L (ref 3.5–5.2)
PROT SERPL-MCNC: 6.2 G/DL (ref 6–8.5)
PROTHROMBIN TIME: 12.3 SECONDS (ref 9.6–11.7)
RBC # BLD AUTO: 2.52 10*6/MM3 (ref 3.77–5.28)
SODIUM SERPL-SCNC: 136 MMOL/L (ref 136–145)
WBC NRBC COR # BLD AUTO: 18.6 10*3/MM3 (ref 3.4–10.8)

## 2023-12-18 PROCEDURE — 85025 COMPLETE CBC W/AUTO DIFF WBC: CPT | Performed by: STUDENT IN AN ORGANIZED HEALTH CARE EDUCATION/TRAINING PROGRAM

## 2023-12-18 PROCEDURE — 25810000003 SODIUM CHLORIDE 0.9 % SOLUTION: Performed by: INTERNAL MEDICINE

## 2023-12-18 PROCEDURE — 25010000002 PIPERACILLIN SOD-TAZOBACTAM PER 1 G: Performed by: STUDENT IN AN ORGANIZED HEALTH CARE EDUCATION/TRAINING PROGRAM

## 2023-12-18 PROCEDURE — 99232 SBSQ HOSP IP/OBS MODERATE 35: CPT | Performed by: STUDENT IN AN ORGANIZED HEALTH CARE EDUCATION/TRAINING PROGRAM

## 2023-12-18 PROCEDURE — 80053 COMPREHEN METABOLIC PANEL: CPT | Performed by: STUDENT IN AN ORGANIZED HEALTH CARE EDUCATION/TRAINING PROGRAM

## 2023-12-18 PROCEDURE — 25010000002 HYDROMORPHONE 1 MG/ML SOLUTION: Performed by: STUDENT IN AN ORGANIZED HEALTH CARE EDUCATION/TRAINING PROGRAM

## 2023-12-18 PROCEDURE — 85610 PROTHROMBIN TIME: CPT | Performed by: INTERNAL MEDICINE

## 2023-12-18 RX ORDER — PANTOPRAZOLE SODIUM 40 MG/1
40 TABLET, DELAYED RELEASE ORAL
Status: DISCONTINUED | OUTPATIENT
Start: 2023-12-19 | End: 2023-12-26 | Stop reason: HOSPADM

## 2023-12-18 RX ADMIN — HYDROMORPHONE HYDROCHLORIDE 1 MG: 1 INJECTION, SOLUTION INTRAMUSCULAR; INTRAVENOUS; SUBCUTANEOUS at 08:06

## 2023-12-18 RX ADMIN — PIPERACILLIN AND TAZOBACTAM 3.38 G: 3; .375 INJECTION, POWDER, FOR SOLUTION INTRAVENOUS at 00:01

## 2023-12-18 RX ADMIN — LISINOPRIL 10 MG: 5 TABLET ORAL at 08:00

## 2023-12-18 RX ADMIN — PIPERACILLIN AND TAZOBACTAM 3.38 G: 3; .375 INJECTION, POWDER, FOR SOLUTION INTRAVENOUS at 16:06

## 2023-12-18 RX ADMIN — HYDROMORPHONE HYDROCHLORIDE 1 MG: 1 INJECTION, SOLUTION INTRAMUSCULAR; INTRAVENOUS; SUBCUTANEOUS at 18:57

## 2023-12-18 RX ADMIN — ESCITALOPRAM OXALATE 20 MG: 10 TABLET ORAL at 08:00

## 2023-12-18 RX ADMIN — SODIUM CHLORIDE 1000 ML: 9 INJECTION, SOLUTION INTRAVENOUS at 12:41

## 2023-12-18 RX ADMIN — HYDROMORPHONE HYDROCHLORIDE 1 MG: 1 INJECTION, SOLUTION INTRAMUSCULAR; INTRAVENOUS; SUBCUTANEOUS at 21:47

## 2023-12-18 RX ADMIN — PANTOPRAZOLE SODIUM 40 MG: 40 INJECTION, POWDER, FOR SOLUTION INTRAVENOUS at 05:10

## 2023-12-18 RX ADMIN — PIPERACILLIN AND TAZOBACTAM 3.38 G: 3; .375 INJECTION, POWDER, FOR SOLUTION INTRAVENOUS at 23:53

## 2023-12-18 RX ADMIN — PIPERACILLIN AND TAZOBACTAM 3.38 G: 3; .375 INJECTION, POWDER, FOR SOLUTION INTRAVENOUS at 08:06

## 2023-12-18 RX ADMIN — LEVOTHYROXINE SODIUM 25 MCG: 25 TABLET ORAL at 05:10

## 2023-12-18 NOTE — PROGRESS NOTES
Colorectal Surgery Progress Note    Pt. Name/Age/:  Charity Mcclure   63 y.o.    1960         Med. Record Number:   [id number]  Date of admission:  2023      Service(s): Colorectal Surgery      Subjective    Pain improved.  BP has been normotensive and afebrile     Discussed and reviewed CT with IR. Likely this is tuboovarian abscess and unlikely a diverticular abscess. I will hold off on draining the abscess       Objective  Vitals:     Patient Vitals for the past 24 hrs:   BP Temp Temp src Pulse Resp SpO2   23 0838 103/63 99.2 °F (37.3 °C) Oral -- 15 --   23 0500 120/68 98.1 °F (36.7 °C) Oral -- 26 --   23 0047 100/67 97.6 °F (36.4 °C) Oral 72 20 98 %   23 2203 102/65 99.9 °F (37.7 °C) -- 81 16 92 %           Wt. Admission: Weight: 70.2 kg (154 lb 12.2 oz)     Wt. Current: Weight: 70.2 kg (154 lb 12.2 oz)   Body mass index is 29.24 kg/m².      I&O:  Intake/Output Summary (Last 24 hours) at 2023 1113  Last data filed at 2023 0806  Gross per 24 hour   Intake 480 ml   Output --   Net 480 ml      1901 -  0700  In: 1440 [P.O.:1440]  Out: -       Exam  General:  No acute distress, resting comfortably.  Cardiovascular: regular rate.  Respiratory: no increased work of breathing.  Abdomen:  Soft, significantly tender with localized rebound and guarding left lower quadrant      Extremities: warm, well-perfused extremities, no pitting edema.           Scheduled Meds:escitalopram, 20 mg, Oral, Daily  levothyroxine, 25 mcg, Oral, Q AM  lisinopril, 10 mg, Oral, Daily  pantoprazole, 40 mg, Intravenous, Q AM  piperacillin-tazobactam, 3.375 g, Intravenous, Q8H  senna-docusate sodium, 2 tablet, Oral, BID  sodium chloride, 10 mL, Intravenous, Q12H      Continuous Infusions:   PRN Meds:.  acetaminophen    senna-docusate sodium **AND** polyethylene glycol **AND** bisacodyl **AND** bisacodyl    Calcium Replacement - Follow Nurse / BPA Driven Protocol    HYDROmorphone     ketorolac    Magnesium Standard Dose Replacement - Follow Nurse / BPA Driven Protocol    nitroglycerin    ondansetron **OR** ondansetron    oxyCODONE    Phosphorus Replacement - Follow Nurse / BPA Driven Protocol    Potassium Replacement - Follow Nurse / BPA Driven Protocol    sodium chloride    sodium chloride          Assessment  63 y.o. female with diverticulitis and a complex abscess likely tubo-ovarian      Plan / Recommendations    - Given this is likely a tubo-ovarian abscess, we will hold off on drainage procedure. Her exam has improved significantly with minimal tenderness.     - Advance to regular diet   - Leukocytosis trending down. Continue with abx   - f/u GYN recommendation regarding management of her tuboovarian abscess   - will plan on colonoscopy as outpatient      11:13 EST; 12/18/2023        Harvinder Vasquez MD  Colon and Rectal Surgery   [facility]

## 2023-12-18 NOTE — CASE MANAGEMENT/SOCIAL WORK
Discharge Planning Assessment  [facility]     Patient Name: Charity Mcclure  MRN: [id number]  Today's Date: 12/18/2023    Admit Date: 12/16/2023    Plan: Home. Family can transport at discharge.   Discharge Needs Assessment       Row Name 12/18/23 5056       Living Environment    People in Home spouse    Name(s) of People in Home Ben    Current Living Arrangements home    Potentially Unsafe Housing Conditions none    Primary Care Provided by self    Provides Primary Care For no one    Family Caregiver if Needed spouse    Family Caregiver Names Bne    Quality of Family Relationships helpful;involved;supportive    Able to Return to Prior Arrangements yes       Resource/Environmental Concerns    Resource/Environmental Concerns none    Transportation Concerns none       Transition Planning    Patient/Family Anticipates Transition to home with family    Patient/Family Anticipated Services at Transition none    Transportation Anticipated car, drives self;family or friend will provide       Discharge Needs Assessment    Readmission Within the Last 30 Days no previous admission in last 30 days    Equipment Currently Used at Home cpap;other (see comments)  has rolling walker, however not using at home    Anticipated Changes Related to Illness none    Equipment Needed After Discharge none    Provided Post Acute Provider List? N/A    N/A Provider List Comment denies dc needs                   Discharge Plan       Row Name 12/18/23 5289       Plan    Plan Home. Family can transport at discharge.    Patient/Family in Agreement with Plan yes    Plan Comments Patient lives at home with spouse . Patient drives , Family will transport at discharge. Patient performs ADL. PCP and pharmacy confirmed. Denies financial assistance needs for medication and/or food. Denies HH and/or rehab needs. Pending plan from GYN .                  Continued Care and Services - Admitted Since 12/16/2023    Coordination has not been started for  this encounter.       Expected Discharge Date and Time       Expected Discharge Date Expected Discharge Time    Dec 19, 2023            Demographic Summary       Row Name 12/18/23 1659       General Information    Admission Type inpatient    Arrived From emergency department    Referral Source admission list    Reason for Consult discharge planning    Preferred Language English       Contact Information    Permission Granted to Share Info With                    Functional Status       Row Name 12/18/23 1651       Functional Status    Usual Activity Tolerance good    Current Activity Tolerance good       Functional Status, IADL    Medications independent    Meal Preparation independent    Housekeeping independent    Laundry independent    Shopping independent       Mental Status    General Appearance WDL WDL       Mental Status Summary    Recent Changes in Mental Status/Cognitive Functioning no changes                    Sindy Hebert, RN

## 2023-12-18 NOTE — PROGRESS NOTES
"[facility] MEDICINE SERVICE  DAILY PROGRESS NOTE      Patient Name: Charity Mcclure  Date of Admission: 12/16/2023  Today's Date: 12/18/23  Length of Stay: 2  Primary Care Physician: Meron Whittaker MD    Subjective   Patient is stable today.  No new complaints.  She continues to have localized LLQ pain. She is scheduled for drainage of her abscess later today.  No overnight events.      Objective    Temp:  [97.6 °F (36.4 °C)-99.9 °F (37.7 °C)] 99.2 °F (37.3 °C)  Heart Rate:  [72-81] 72  Resp:  [15-26] 15  BP: (100-120)/(63-68) 103/63  Physical Exam  General: NAD, AAOx3  HEENT: AT NC, EOMI  Neck: No JVD  CVS: S1/S2 present, RRR, no M/R/G  Lungs: CTA B/L  Abdomen: soft, tender at LLQ, ND, BS+  Ext: no edema  Skin: no rashes, bruises or discolorations  Neuro: no focal deficits  Psych: Not agitated       Results Review:  I have reviewed the labs, radiology results, and diagnostic studies.    Laboratory Data:   Results from last 7 days   Lab Units 12/18/23  0401 12/17/23  0129 12/16/23  0525   WBC 10*3/mm3 18.60* 19.20* 22.50*   HEMOGLOBIN g/dL 7.6* 7.4* 7.9*   HEMATOCRIT % 23.4* 22.7* 24.0*   PLATELETS 10*3/mm3 328 341 367        Results from last 7 days   Lab Units 12/18/23  0401 12/17/23  1540 12/17/23  0129 12/16/23  0525   SODIUM mmol/L 136  --  139 137   POTASSIUM mmol/L 4.1 4.7 3.6 3.7   CHLORIDE mmol/L 99  --  101 99   CO2 mmol/L 23.0  --  25.0 28.0   BUN mg/dL 4*  --  6* 10   CREATININE mg/dL 0.62  --  0.60 0.68   CALCIUM mg/dL 8.7  --  8.7 8.8   BILIRUBIN mg/dL 0.4  --  0.4 0.4   ALK PHOS U/L 96  --  137* 107   ALT (SGPT) U/L 12  --  11 8   AST (SGOT) U/L 17  --  18 12   GLUCOSE mg/dL 90  --  95 83       Culture Data:   No results found for: \"BLOODCX\"  Urine Culture   Date Value Ref Range Status   12/16/2023 No growth  Final     No results found for: \"RESPCX\"  No results found for: \"WOUNDCX\"  No results found for: \"STOOLCX\"  No components found for: \"BODYFLD\"    Radiology Data:   Imaging Results " (Last 24 Hours)       ** No results found for the last 24 hours. **            I have reviewed the patient's current medications.     Assessment/Plan     1) diverticulitis/tubo-ovarian abscess  - seen on imaging  - colorectal surgery on board, appreciate recs  - drainage of abscess later today  - OB/GYN consulted, appreciate recs  - zosyn    2) HTN  - home meds    3) hypothyroidism  - synthroid    4) mood disorders  - home meds    5) GI/DVT ppx  - protonix/SCDs        Electronically signed by Dana Josue MD, 12/18/23, 09:56 EST.

## 2023-12-18 NOTE — PLAN OF CARE
Problem: Adult Inpatient Plan of Care  Goal: Plan of Care Review  Outcome: Ongoing, Progressing  Goal: Patient-Specific Goal (Individualized)  Outcome: Ongoing, Progressing  Goal: Absence of Hospital-Acquired Illness or Injury  Outcome: Ongoing, Progressing  Intervention: Identify and Manage Fall Risk  Recent Flowsheet Documentation  Taken 12/18/2023 1626 by Chanda Garcia LPN  Safety Promotion/Fall Prevention:   safety round/check completed   room organization consistent   nonskid shoes/slippers when out of bed   clutter free environment maintained   assistive device/personal items within reach  Taken 12/18/2023 1400 by Chanda Garcia LPN  Safety Promotion/Fall Prevention:   safety round/check completed   room organization consistent   nonskid shoes/slippers when out of bed   clutter free environment maintained   assistive device/personal items within reach   activity supervised  Taken 12/18/2023 1208 by Chanda Garcia LPN  Safety Promotion/Fall Prevention:   safety round/check completed   room organization consistent   nonskid shoes/slippers when out of bed   clutter free environment maintained   assistive device/personal items within reach   activity supervised  Taken 12/18/2023 1041 by Chanda Garcia LPN  Safety Promotion/Fall Prevention:   safety round/check completed   room organization consistent   nonskid shoes/slippers when out of bed  Taken 12/18/2023 0806 by Chanda Garcia LPN  Safety Promotion/Fall Prevention:   safety round/check completed   room organization consistent   nonskid shoes/slippers when out of bed   clutter free environment maintained   assistive device/personal items within reach   activity supervised  Intervention: Prevent Skin Injury  Recent Flowsheet Documentation  Taken 12/18/2023 0806 by Chanda Garcia LPN  Body Position: position changed independently  Intervention: Prevent and Manage VTE (Venous Thromboembolism) Risk  Recent Flowsheet Documentation  Taken 12/18/2023 0806 by Radha  MANUELITO Armas  Activity Management: ambulated in room  Goal: Optimal Comfort and Wellbeing  Outcome: Ongoing, Progressing  Intervention: Provide Person-Centered Care  Recent Flowsheet Documentation  Taken 12/18/2023 0806 by Chanda Garcia LPN  Trust Relationship/Rapport:   care explained   thoughts/feelings acknowledged   questions answered  Goal: Readiness for Transition of Care  Outcome: Ongoing, Progressing     Problem: Pain Acute  Goal: Acceptable Pain Control and Functional Ability  Outcome: Ongoing, Progressing  Intervention: Prevent or Manage Pain  Recent Flowsheet Documentation  Taken 12/18/2023 1626 by Chanda Garcia LPN  Medication Review/Management: medications reviewed  Taken 12/18/2023 1400 by Chanda Garcia LPN  Medication Review/Management: medications reviewed  Taken 12/18/2023 1208 by Chanda Garcia LPN  Medication Review/Management: medications reviewed  Taken 12/18/2023 1041 by Chanda Garcia LPN  Medication Review/Management: medications reviewed  Taken 12/18/2023 0806 by Chanda Garcia LPN  Medication Review/Management: medications reviewed  Intervention: Optimize Psychosocial Wellbeing  Recent Flowsheet Documentation  Taken 12/18/2023 0806 by Chanda Garcia LPN  Diversional Activities: television   Goal Outcome Evaluation:

## 2023-12-18 NOTE — PAYOR COMM NOTE
"Charity Menchaca (63 y.o. Female)  1960  Policy no. [id number]   Requesting IP Auth for ER Medical Admission    AUTHORIZATION PENDING - Remains in Hospital 23  PLEASE FORWARD DETERMINATION TO FOLLOWING CONTACT:    LIANNA JONES LPN UR  Utilization Review Nurse  [facility]  Direct & confidential phone # [phone]  Fax # [phone]        Date of Birth   1960    Social Security Number       Address   [address] [place] [address]    Home Phone   [phone]    MRN   [id number]       Gnosticist   None    Marital Status                               Admission Date   23    Admission Type   Urgent    Admitting Provider   Kleber Steele MD    Attending Provider   Dana Josue MD    Department, Room/Bed   [facility] SURGICAL INPATIENT, 4105/1       Discharge Date       Discharge Disposition       Discharge Destination                                 Attending Provider: Dana Josue MD    Allergies: No Known Allergies    Isolation: None   Infection: None   Code Status: CPR    Ht: 154.9 cm (61\")   Wt: 70.2 kg (154 lb 12.2 oz)    Admission Cmt: None   Principal Problem: Perforated diverticulum [K57.80]                   Active Insurance as of 2023       Primary Coverage       Payor Plan Insurance Group Employer/Plan Group    [facility] [employer] [facility] [employer] BLUE [employer] PPO [id number]       Payor Plan Address Payor Plan Phone Number Payor Plan Fax Number Effective Dates    [address] [phone]  2022 - None Entered    [place] [address]         Subscriber Name Subscriber Birth Date Member ID       CHARITY MENCHACA 1960 [id number]                     Emergency Contacts        (Rel.) Home Phone Work Phone Mobile Phone    SHAHLA MENCHACA (Spouse) [phone] -- [phone]                 History & Physical        Kleber Steele MD at 23 0245              [facility] Medicine Services  History & " Physical    Patient Name: Charity Mcclure  : 1960  MRN: [id number]  Primary Care Physician:  Meron Whittaker MD  Date of admission: 2023  Date and Time of Service: 2023 at 0100    Subjective      Chief Complaint: Abdominal Pain    History of Present Illness: Charity Mcclure is a 63 y.o. female with a CMH of HTN, hypothyroidism, right hip repair who presented to [facility] on 2023 with abdominal pain.    Patient states starting last week, she started having abdominal pain, fevers, chills, myalgias.  She initially thought she had the flu, but then she started developing diarrhea about 2-3 days ago, and then she has now started having issues with PO intake.  She presented to [facility] ED where they scanned her abdomen and found she had a perforated diverticulum.  They also found a tubo-ovarian mass, which they suspected was an abscess.  Given these findings, gynecology at [place] was consulted and they felt that if colorectal would be on board, that it would be possible to manage this patient here.  [facility] did agree to be consulted, so transfer was accepted.      Review of Systems   Constitutional:  Negative for chills and fever.   HENT:  Negative for congestion and rhinorrhea.    Eyes:  Negative for visual disturbance.   Respiratory:  Negative for shortness of breath.    Cardiovascular:  Negative for chest pain.   Gastrointestinal:  Positive for abdominal pain, diarrhea and nausea. Negative for vomiting.   Endocrine: Negative for polyuria.   Genitourinary:  Negative for difficulty urinating and dyspareunia.   Musculoskeletal:  Negative for back pain and myalgias.   Skin:  Negative for rash and wound.   Neurological:  Negative for weakness, numbness and headaches.   Psychiatric/Behavioral:  Negative for agitation, behavioral problems and confusion.        Personal History     Past Medical History:   Diagnosis Date    Disease of thyroid gland 2023    Hypertension  12/16/2023       History reviewed. No pertinent surgical history.    Family History: family history is not on file. Otherwise pertinent FHx was reviewed and not pertinent to current issue.    Social History:  reports that she has been smoking cigarettes. She has been smoking an average of 1 pack per day. She has never used smokeless tobacco. She reports that she does not drink alcohol and does not use drugs.    Home Medications:  Prior to Admission Medications       Prescriptions Last Dose Informant Patient Reported? Taking?    escitalopram (LEXAPRO) 20 MG tablet Unknown  Yes No    Take 1 tablet by mouth Daily.    levothyroxine (SYNTHROID, LEVOTHROID) 25 MCG tablet Unknown  Yes No    Take 1 tablet by mouth Every Morning.    lisinopril (PRINIVIL,ZESTRIL) 10 MG tablet Unknown  Yes No    Take 1 tablet by mouth Daily.    rivaroxaban (XARELTO) 10 MG tablet Unknown  Yes No    Take 1 tablet by mouth Daily.              Allergies:  No Known Allergies    Objective      Vitals:   Temp:  [98 °F (36.7 °C)] 98 °F (36.7 °C)  Heart Rate:  [76] 76  Resp:  [16] 16  BP: (112)/(68) 112/68  Body mass index is 29.24 kg/m².  Physical Exam  Constitutional:       General: She is not in acute distress.  HENT:      Head: Normocephalic and atraumatic.   Cardiovascular:      Rate and Rhythm: Normal rate and regular rhythm.      Heart sounds: Normal heart sounds.   Pulmonary:      Effort: Pulmonary effort is normal. No respiratory distress.      Breath sounds: Normal breath sounds.   Abdominal:      General: Bowel sounds are normal.      Palpations: Abdomen is soft.      Tenderness: There is abdominal tenderness. There is no rebound.   Musculoskeletal:      Right lower leg: No edema.      Left lower leg: No edema.   Skin:     General: Skin is warm and dry.   Neurological:      Mental Status: She is alert.   Psychiatric:         Mood and Affect: Mood normal.         Behavior: Behavior normal.         Diagnostic Data:  Lab Results (last 24 hours)        ** No results found for the last 24 hours. **             Imaging Results (Last 24 Hours)       ** No results found for the last 24 hours. **              Assessment & Plan        This is a 63 y.o. female with:    Active and Resolved Problems  Active Hospital Problems    Diagnosis  POA    **Perforated diverticulum [K57.80]  Yes    Mood disorder [F39]  Yes    Hypertension [I10]  Yes    Disease of thyroid gland [E07.9]  Yes    Tubo-ovarian mass [N94.89]  Yes      Resolved Hospital Problems   No resolved problems to display.       #Diverticulitis with perforation  From review of the chart, it looks like the patient had recent surgery for her hip and was started on a course of xarelto for DVT ppx.  It may be possible, that this contributed to the perforation, as the timeline does fit.  Overall, patient does not exhibit sepsis criteria.  It is possible that the contents of the perforated diverticulum could have caused a tuboovarian abscess.  - Zosyn IV for now  - Pain control, monitor for toxicity  - Colorectal surgery consulted    #Tuboovarian Mass  The best case scenario is the above.  Other possible etiologies would be STI leading to abscess.  Also possible this could be a coincidental finding of a malignancy.  Was hesitant to take this patient given that malignancy was on the differential, as gyn-onc is not available at our facility, but accepted per hospital policy.  - STD panel  - UA  - Gynecology c/s  - Abx as above    #HTN  HDS.  - Continue home meds    #Hypothyrodism  #Mood Disorder  Stable.  - Continue home meds    DVT prophylaxis:  Medical DVT prophylaxis orders are present.    The patient desires to be as follows:    CODE STATUS:    Level Of Support Discussed With: Patient  Code Status (Patient has no pulse and is not breathing): CPR (Attempt to Resuscitate)  Medical Interventions (Patient has pulse or is breathing): Full Support        Ben Mcclure, who can be contacted at [phone], is the  designated person to make medical decisions on the patient's behalf if She is incapable of doing so. This was clarified with patient and/or next of kin on 12/16/2023 during the course of this H&P.    Admission Status:  I believe this patient meets inpatient status.    Expected Length of Stay: 3 nights    PDMP and Medication Dispenses via Sidebar reviewed and consistent with patient reported medications.    I discussed the patient's findings and my recommendations with patient and family.      Signature:     This document has been electronically signed by Kleber Steele MD on December 16, 2023 02:53 [facility] Hospitalist Team     Electronically signed by Kleber Steele MD at 12/16/23 0259          Physician Progress Notes (last 72 hours)        Dana Josue MD at 12/17/23 0902              [facility]  DAILY PROGRESS NOTE      Patient Name: Charity Mcclure  Date of Admission: 12/16/2023  Today's Date: 12/17/23  Length of Stay: 1  Primary Care Physician: Meron Whittaker MD    Subjective   Patient is stable today.  Her abdominal pain is now localized to the LLQ.  She has no appetite and is having difficulty eating.  No other complaints.  No overnight events.      Objective    Temp:  [98 °F (36.7 °C)-99.4 °F (37.4 °C)] 98 °F (36.7 °C)  Heart Rate:  [75-90] 90  Resp:  [14-24] 18  BP: ()/(63-71) 112/70  Physical Exam  General: NAD, AAOx3  HEENT: AT NC, EOMI  Neck: No JVD  CVS: S1/S2 present, RRR, no M/R/G  Lungs: CTA B/L  Abdomen: soft, tender at LLQ, ND, BS+  Ext: no edema  Skin: no rashes, bruises or discolorations  Neuro: no focal deficits  Psych: Not agitated       Results Review:  I have reviewed the labs, radiology results, and diagnostic studies.    Laboratory Data:   Results from last 7 days   Lab Units 12/17/23  0129 12/16/23  0525   WBC 10*3/mm3 19.20* 22.50*   HEMOGLOBIN g/dL 7.4* 7.9*   HEMATOCRIT % 22.7* 24.0*   PLATELETS 10*3/mm3 341 367        Results from last 7 days  "  Lab Units 12/17/23  0129 12/16/23  0525   SODIUM mmol/L 139 137   POTASSIUM mmol/L 3.6 3.7   CHLORIDE mmol/L 101 99   CO2 mmol/L 25.0 28.0   BUN mg/dL 6* 10   CREATININE mg/dL 0.60 0.68   CALCIUM mg/dL 8.7 8.8   BILIRUBIN mg/dL 0.4 0.4   ALK PHOS U/L 137* 107   ALT (SGPT) U/L 11 8   AST (SGOT) U/L 18 12   GLUCOSE mg/dL 95 83       Culture Data:   No results found for: \"BLOODCX\"  No results found for: \"URINECX\"  No results found for: \"RESPCX\"  No results found for: \"WOUNDCX\"  No results found for: \"STOOLCX\"  No components found for: \"BODYFLD\"    Radiology Data:   Imaging Results (Last 24 Hours)       Procedure Component Value Units Date/Time    XR Abdomen KUB [[id number]] Collected: 12/16/23 0948     Updated: 12/16/23 0952    Narrative:      XR ABDOMEN KUB    Date of Exam: 12/16/2023 8:43 AM CST    Indication: hypotensive    Comparison: None available.    Findings:  Upright radiograph obtained of the abdomen demonstrates a few nonspecific gas fluid levels in the right upper quadrant. No significant small bowel distention noted. Gas is present within the colon. No free intraperitoneal gas noted. The lung bases are   clear. No calcifications overlying the expected location of the kidneys. Right total hip arthroplasty is noted.      Impression:      Impression:  No acute process identified within the abdomen.      Electronically Signed: Tali Mercedes MD    12/16/2023 8:50 AM CST    Workstation ID: [id number]            I have reviewed the patient's current medications.     Assessment/Plan     1) perforated diverticulitis/tubo-ovarian abscess  - seen on imaging  - colorectal surgery on board, appreciate recs  - will need IR drainage of abscess  - OB/GYN consulted, appreciate recs  - zosyn    2) HTN  - home meds    3) hypothyroidism  - synthroid    4) mood disorders  - home meds    5) GI/DVT ppx  - protonix/SCDs        Electronically signed by Dana Josue MD, 12/17/23, 09:02 EST.      Electronically signed by " Dana Josue MD at 23 0907       Harvinder Vasquez MD at 23 0735          Colorectal Surgery Progress Note    Pt. Name/Age/:  Charity Mcclure   63 y.o.    1960         Med. Record Number:   [id number]  Date of admission:  2023      Service(s): Colorectal Surgery      Subjective    Reports pain slightly better   BP improved after bolus fluid   Continues to have a drift in her Hgb, 7.4 this am   No fever  Leukocytosis improving     Objective  Vitals:     Patient Vitals for the past 24 hrs:   BP Temp Temp src Pulse Resp SpO2   23 0500 106/64 98.4 °F (36.9 °C) -- 77 16 98 %   23 0055 102/71 98.5 °F (36.9 °C) -- 75 22 95 %   23 2100 95/64 98.4 °F (36.9 °C) -- -- 22 90 %   23 1642 97/64 99.1 °F (37.3 °C) Oral 78 24 97 %   23 1239 101/63 99.4 °F (37.4 °C) Axillary 82 14 97 %   23 1226 101/69 -- -- 77 20 95 %   23 1035 108/68 -- -- 78 18 93 %   23 0850 (!) 85/58 98.3 °F (36.8 °C) Oral 81 22 95 %   23 0846 (!) 85/58 -- -- 82 -- --           Wt. Admission: Weight: 70.2 kg (154 lb 12.2 oz)     Wt. Current: Weight: 70.2 kg (154 lb 12.2 oz)   Body mass index is 29.24 kg/m².      I&O:  Intake/Output Summary (Last 24 hours) at 2023 07  Last data filed at 2023 2100  Gross per 24 hour   Intake 600 ml   Output --   Net 600 ml     12/15 1901 -  0700  In: 600 [P.O.:600]  Out: -       Exam  General:  No acute distress, resting comfortably.  Cardiovascular: regular rate.  Respiratory: no increased work of breathing.  Abdomen:  Soft, significantly tender with localized rebound and guarding left lower quadrant     Extremities: warm, well-perfused extremities, no pitting edema.      Scheduled Meds:escitalopram, 20 mg, Oral, Daily  levothyroxine, 25 mcg, Oral, Q AM  lisinopril, 10 mg, Oral, Daily  pantoprazole, 40 mg, Intravenous, Q AM  piperacillin-tazobactam, 3.375 g, Intravenous, Q8H  potassium chloride ER, 40 mEq, Oral, Q4H  senna-docusate  sodium, 2 tablet, Oral, BID  sodium chloride, 10 mL, Intravenous, Q12H      Continuous Infusions:sodium chloride, 125 mL/hr, Last Rate: 125 mL/hr (12/16/23 0524)      PRN Meds:.  acetaminophen    senna-docusate sodium **AND** polyethylene glycol **AND** bisacodyl **AND** bisacodyl    Calcium Replacement - Follow Nurse / BPA Driven Protocol    HYDROmorphone    ketorolac    Magnesium Standard Dose Replacement - Follow Nurse / BPA Driven Protocol    nitroglycerin    ondansetron **OR** ondansetron    oxyCODONE    Phosphorus Replacement - Follow Nurse / BPA Driven Protocol    Potassium Replacement - Follow Nurse / BPA Driven Protocol    sodium chloride    sodium chloride          Assessment  63 y.o. female with diverticulitis and a complex abscess likely tubo-ovarian     Plan / Recommendations    - Currently her blood pressure has normalized and leukocytosis improving. She still has localized peritonitis over the left lower quadrant.   - continue with IV antibiotics   - NPO after 4 am tomorrow to discuss with IR for possible drainage of abscess   - might need transfusion if her blood pressure becomes low or continues to trend down   - hold off on surgery for now   - appreciate gyn recommendation   - ok to continue on clear liquid diet     07:35 EST; 12/17/2023        Harvinder Vasquez MD  Colon and Rectal Surgery   [facility]        Electronically signed by Harvinder Vasquez MD at 12/17/23 0713       Dana Josue MD at 12/16/23 1130          Patient seen and examined independently.  I agree with the H&P as documented.      Electronically signed by Dana Josue MD at 12/16/23 1130          Consult Notes (last 72 hours)        Suzette Robert MD at 12/16/23 1623        Consult Orders    1. Inpatient Gynecology Consult [[id number]] ordered by Kleber Steele MD at 12/16/23 0144                 Reason for Consultation: poss TOA    Patient Care Team:  Meron Whittaker MD as PCP - General (Internal Medicine)    Chief  complaint lower abdominal pain    Subjective .     History of present illness:  62 yo  who presents from [place] ER with diverticulitis and abscess collection. X-ray here shows no free air. Also mentioned on CT from [facility] was a possible left ovarian or tubo-ovarian abscess.    Objective     Vital Signs   Vitals:    23 0850 23 1035 23 1226 23 1239   BP: (!) 85/58 108/68 101/69 101/63   BP Location: Right arm   Right arm   Patient Position: Lying   Lying   Pulse: 81 78 77 82   Resp:  14   Temp: 98.3 °F (36.8 °C)   99.4 °F (37.4 °C)   TempSrc: Oral   Axillary   SpO2: 95% 93% 95% 97%   Weight:       Height:         Temp (24hrs), Av.5 °F (36.9 °C), Min:98 °F (36.7 °C), Max:99.4 °F (37.4 °C)      Physical Exam:       General Appearance:    Alert, cooperative, in no acute distress   Head:    Normocephalic, without obvious abnormality, atraumatic   Eyes:         PERRLA   Abdomen:     Soft, non-distended, mild tenderness lower abdomen     Lab Results:  Lab Results (last 48 hours)       Procedure Component Value Units Date/Time    Chlamydia trachomatis, Neisseria gonorrhoeae, Trichomonas vaginalis, PCR - Urine, Urine, Clean Catch [[id number]]  (Normal) Collected: 23    Specimen: Urine, Clean Catch Updated: 23     Chlamydia DNA by PCR Not Detected     Neisseria gonorrhoeae by PCR Not Detected     Trichomonas vaginalis PCR Not Detected    Urinalysis, Microscopic Only - Urine, Clean Catch [[id number]]  (Abnormal) Collected: 23    Specimen: Urine, Clean Catch Updated: 23     RBC, UA 3-5 /HPF      WBC, UA 21-50 /HPF      Bacteria, UA 2+ /HPF      Squamous Epithelial Cells, UA 3-6 /HPF      Hyaline Casts, UA None Seen /LPF      Methodology Manual Light Microscopy    Urine Culture - Urine, Urine, Clean Catch [[id number]] Collected: 23    Specimen: Urine, Clean Catch Updated: 23    Urinalysis With Culture If Indicated - Urine, Clean  Catch [[id number]]  (Abnormal) Collected: 12/16/23 0549    Specimen: Urine, Clean Catch Updated: 12/16/23 0643     Color, UA Yellow     Appearance, UA Cloudy     pH, UA 6.0     Specific Gravity, UA 1.032     Glucose, UA Negative     Ketones, UA Trace     Bilirubin, UA Negative     Blood, UA Small (1+)     Protein, UA Trace     Leuk Esterase, UA Small (1+)     Nitrite, UA Negative     Urobilinogen, UA 1.0 E.U./dL    Narrative:      In absence of clinical symptoms, the presence of pyuria, bacteria, and/or nitrites on the urinalysis result does not correlate with infection.    Comprehensive Metabolic Panel [[id number]]  (Abnormal) Collected: 12/16/23 0525    Specimen: Blood Updated: 12/16/23 0605     Glucose 83 mg/dL      BUN 10 mg/dL      Creatinine 0.68 mg/dL      Sodium 137 mmol/L      Potassium 3.7 mmol/L      Chloride 99 mmol/L      CO2 28.0 mmol/L      Calcium 8.8 mg/dL      Total Protein 6.1 g/dL      Albumin 3.0 g/dL      ALT (SGPT) 8 U/L      AST (SGOT) 12 U/L      Alkaline Phosphatase 107 U/L      Total Bilirubin 0.4 mg/dL      Globulin 3.1 gm/dL      A/G Ratio 1.0 g/dL      BUN/Creatinine Ratio 14.7     Anion Gap 10.0 mmol/L      eGFR 98.0 mL/min/1.73     Narrative:      GFR Normal >60  Chronic Kidney Disease <60  Kidney Failure <15      CBC Auto Differential [[id number]]  (Abnormal) Collected: 12/16/23 0525    Specimen: Blood Updated: 12/16/23 0536     WBC 22.50 10*3/mm3      RBC 2.55 10*6/mm3      Hemoglobin 7.9 g/dL      Hematocrit 24.0 %      MCV 94.1 fL      MCH 31.0 pg      MCHC 33.0 g/dL      RDW 14.4 %      RDW-SD 47.7 fl      MPV 6.6 fL      Platelets 367 10*3/mm3      Neutrophil % 83.1 %      Lymphocyte % 8.1 %      Monocyte % 8.0 %      Eosinophil % 0.5 %      Basophil % 0.3 %      Neutrophils, Absolute 18.70 10*3/mm3      Lymphocytes, Absolute 1.80 10*3/mm3      Monocytes, Absolute 1.80 10*3/mm3      Eosinophils, Absolute 0.10 10*3/mm3      Basophils, Absolute 0.10 10*3/mm3      nRBC 0.0 /100 WBC              Radiology Results:  Imaging Results (Last 72 Hours)       Procedure Component Value Units Date/Time    XR Abdomen KUB [[id number]] Collected: 2348     Updated: 23    Narrative:      XR ABDOMEN KUB    Date of Exam: 2023 8:43 AM CST    Indication: hypotensive    Comparison: None available.    Findings:  Upright radiograph obtained of the abdomen demonstrates a few nonspecific gas fluid levels in the right upper quadrant. No significant small bowel distention noted. Gas is present within the colon. No free intraperitoneal gas noted. The lung bases are   clear. No calcifications overlying the expected location of the kidneys. Right total hip arthroplasty is noted.      Impression:      Impression:  No acute process identified within the abdomen.      Electronically Signed: Tali Mercedes MD    2023 8:50 AM CST    Workstation ID: [id number]    CT Outside Films [[id number]] Resulted: 23     Updated: 23    Narrative:      This procedure was auto-finalized with no dictation required.            Assessment & Plan     Principal Problem:    Perforated diverticulum  Active Problems:    Mood disorder    Hypertension    Disease of thyroid gland    Tubo-ovarian mass      Currently on abx. Will follow. If surgery is indicated, I am available to evaluate possible TOA if needed.    I discussed the patients findings and my recommendations with patient    Suzette Robert MD  23  16:23 EST      Electronically signed by Suzette Robert MD at 23 1845       Harvinder Vasquez MD at 23 0840          Colorectal Surgery Consultation Note    ID:  Charity Mcculre;   : 1960  DATE OF VISIT: 2023    Chief Complaint  No chief complaint on file.       History of Present Illness  Charity Mcclure is a 63 y.o. female who I was asked to see in consultation by Dr Vicente Steele to evaluate for abdominal pain.    Patient states she since her hip replacement two  weeks, she has been having abdominal pain. Pain is localized in the left lower quadrant radiating to right lower quadrant. She never had  prior episodes of diverticulitis. Patient denies any urinary symptoms. Patient has not had an episode of complicated diverticulitis requiring IR drainage or hospital admission.     Patient states she has a bowel movement 1-2 per day. It is hard in consistency. Patient has no bleeding with stools; she has pain with bowel movements; she has no itching;  she has no protrusion of tissue while straining.    Patient does not take supplemental fiber.    Last colonoscopy: 13 years ago. No family history of colon or rectal cancer    Upon admission, she had hypotension to systolic of 80. Has not been tachycardic. She has been afebrile. She was noted to have elevated leukocytosis to  22.50. CT, per charts, demonstrates diverticulitis with perforation and abscess formation.      Past Medical History  Past Medical History:   Diagnosis Date    Disease of thyroid gland 12/16/2023    Hypertension 12/16/2023     Past Surgical History  History reviewed. No pertinent surgical history.  Family History  History reviewed. No pertinent family history.  No colorectal cancer history in immediate family members.  Social History  Social History     Tobacco Use    Smoking status: Every Day     Packs/day: 1     Types: Cigarettes    Smokeless tobacco: Never   Vaping Use    Vaping Use: Never used   Substance Use Topics    Alcohol use: Never    Drug use: Never     For further details please see Health History Questionnaire scanned in Epic.  Medication List    Current Facility-Administered Medications:     acetaminophen (TYLENOL) tablet 650 mg, 650 mg, Oral, Q4H PRN, Kleber Steele MD    sennosides-docusate (PERICOLACE) 8.6-50 MG per tablet 2 tablet, 2 tablet, Oral, BID **AND** polyethylene glycol (MIRALAX) packet 17 g, 17 g, Oral, Daily PRN **AND** bisacodyl (DULCOLAX) EC tablet 5 mg, 5 mg, Oral, Daily PRN **AND**  bisacodyl (DULCOLAX) suppository 10 mg, 10 mg, Rectal, Daily PRN, Kleber Steele MD    Calcium Replacement - Follow Nurse / BPA Driven Protocol, , Does not apply, PRN, Kleber Steele MD    escitalopram (LEXAPRO) tablet 20 mg, 20 mg, Oral, Daily, Kleber Steele MD    heparin (porcine) 5000 UNIT/ML injection 5,000 Units, 5,000 Units, Subcutaneous, Q8H, Kleber Steele MD, 5,000 Units at 12/16/23 0627    HYDROmorphone (DILAUDID) injection 1 mg, 1 mg, Intravenous, Q2H PRN, Kleber Steele MD    levothyroxine (SYNTHROID, LEVOTHROID) tablet 25 mcg, 25 mcg, Oral, Q AM, Kleber Steele MD, 25 mcg at 12/16/23 0627    lisinopril (PRINIVIL,ZESTRIL) tablet 10 mg, 10 mg, Oral, Daily, Kleber Steele MD    Magnesium Standard Dose Replacement - Follow Nurse / BPA Driven Protocol, , Does not apply, PRN, Kleber Steele MD    nitroglycerin (NITROSTAT) SL tablet 0.4 mg, 0.4 mg, Sublingual, Q5 Min PRN, Kleber Steele MD    ondansetron (ZOFRAN) tablet 4 mg, 4 mg, Oral, Q6H PRN **OR** ondansetron (ZOFRAN) injection 4 mg, 4 mg, Intravenous, Q6H PRN, Kleber Steele MD    oxyCODONE (ROXICODONE) immediate release tablet 5 mg, 5 mg, Oral, Q4H PRN, Kleber Steele MD, 5 mg at 12/16/23 0201    pantoprazole (PROTONIX) injection 40 mg, 40 mg, Intravenous, Q AM, Kleber Steele MD, 40 mg at 12/16/23 0543    Phosphorus Replacement - Follow Nurse / BPA Driven Protocol, , Does not apply, PRN, Kleber Steele MD    piperacillin-tazobactam (ZOSYN) IVPB 3.375 g in 100 mL NS (CD), 3.375 g, Intravenous, Q8H, Kleber Steele MD    Potassium Replacement - Follow Nurse / BPA Driven Protocol, , Does not apply, PRN, Kleber Steele MD    sodium chloride 0.9 % flush 10 mL, 10 mL, Intravenous, Q12H, Kleber Steele MD, 10 mL at 12/16/23 0202    sodium chloride 0.9 % flush 10 mL, 10 mL, Intravenous, PRN, Kleber Steele MD    sodium chloride 0.9 % infusion 40 mL, 40 mL, Intravenous, PRN, Kleber Steele MD    sodium chloride 0.9 % infusion, 100 mL/hr, Intravenous, Continuous, Kleber Steele MD, Last Rate: 100 mL/hr at 12/16/23  0201, 100 mL/hr at 12/16/23 0201   Allergies  No Known Allergies  Review of Systems  All systems reviewed and are otherwise negative, pertinent positives noted in the HPI and Health History Questionnaire scanned in Epic.    Physical Exam  General:  No acute distress  Head: Normocephalic, atraumatic  Neuro: Alert and oriented    Abdomen:  Soft, moderate tenderness in the low abdomen especially on the left side, no diffuse peritoneal signs, non-distended, no masses, no hernias, no hepatomegaly, no splenomegaly. No abnormal, audible bowel sounds.      Assessment  - 64 yo with hypotensive, diverticulitis and abscess collection     Plan / Recommendations    1 unable to review her CT scan from outside facility, awaiting images to be uploaded. Currently hypotensive w/o tachycardia. Non sick appearing.     2. 1L bolus given. Unsure if this due to anemia vs sepsis. Reports no blood with BM. Possible from her recent orthopedic surgery. Currently she not on anticoagulation. I have sent type and scree for possible need for transfusion.    3. I have obtained A-xray, no free air     4. No diffuse peritonitis.Responsive to fluids.Continue with serial abdominal exam, hold off on emergent surgical intervention unless clinically worsens    5. Continue with IV antibiotics    6 ok for clear liquid diet       7. I have also personally reviewed her laboratory that is remarkable for anemia with Hgb of 7.9 and elevated WBC to 22.     I have personally reviewed all physician notes related to this patient which can be summarized as patient having diverticulitis with an abscess .    8. I will continue to f/u along       Harvinder Vasquez MD   Colon and Rectal Surgery   [facility]     Electronically signed by Harvinder Vasquez MD at 12/16/23 9949

## 2023-12-18 NOTE — PLAN OF CARE
Goal Outcome Evaluation:      Pt doing well. Pain med only given once at the beginning of the shift. Pt NPO. CHG bath completed for procedure in IR today. IVF continued.                     High Fiber Diet   What is Dietary Fiber?  All fiber comes from plants, bushes, giovanni or trees. Of course, the ones that we eat provide us with fruits, vegetables and grains. There are many different types of fiber but the most important to the health of the body are:  Insoluble Fiber  This fiber does not dissolve in water, nor is it fermented by the bacteria residing in the colon. Rather, it retains water and helps to promote a larger, bulkier and more regular bowel activity. This is important in preventing disorders such as diverticulosis and hemorrhoids, and in sweeping out certain toxins and cancer causing carcinogens.   Sources of insoluble fiber are:  whole grain wheat and other whole grains  corn bran, including popcorn, unflavored and unsweetened  nuts and seeds  potatoes and the skins from most fruits from trees such as apples, bananas and avocados  many green vegetables such as green beans, zucchini, celery and cauliflower  some fruit plants such as tomatoes and kiwi  Soluble Fiber  These fibers are fermented or used by the colon bacteria as a food source or nourishment. When these good bacteria grow and thrive, many health benefits occur in both the colon and the body. Soluble fiber is present in some degree in most edible plant foods, but the ones with the most soluble fiber include:  legumes such as peas and most beans, including soybeans  oats, rye and barley  many fruits such as berries, plums, apples bananas and pears  certain vegetables such as broccoli and carrots  most root vegetables  psyllium husk supplement products  Benefits of a High Fiber Diet  The health benefits of a high fiber diet, consumed on a regular basis and reaching recommended amounts (below), are now fairly well-defined. What is now known regarding a high fiber diet include:  Bowel Regularity  A high fiber diet promotes regularity with a softer, bulkier and regular stool pattern. This decreases the chance of hemorrhoids,  diverticulosis and perhaps colon cancer.  Cholesterol and Reduced Triglycerides  The soluble fibers are the ones that will reduce cholesterol levels when used on a regular basis. They may also reduce the incidence of coronary heart disease. Oats, flax seeds and legumes or beans are the recommended fibers.  Colon Polyps and Cancer  It is still not certain if a high fiber diet helps prevent colon cancer. Considerable research suggests that this may occur. Certainly it makes sense to increase regularity and so speed the movement of cancer causing carcinogens through the bowel. In addition, reducing a heavy meat diet reduces the bile flow from the liver in a favorable way. This, too, reduces the amount of carcinogens that reach and are manufactured in the colon. Finally, a high fiber diet, increases the integrity and health of the wall of the colon. The risk of cancer may be reduced.  Colon Wall Integrity  A high fiber diet changes the bacterial makeup of the colon toward a more favorable balance. For instance, it is known that those people with obesity, diabetes type 2 and inflammatory bowel disease have a predominance of bad bacteria in the colon. This, in turn, may render the bowel wall weak and allow bacteria and even toxins to seep through. A high fiber diet with a modest reduction in animal and meat products may return the bacterial makeup to a more positive balance.   Blood Sugar  Soluble fiber such as in legumes (beans), oats slows the absorption of blood sugar and so helps regulate the sugar in the blood. Insoluble fiber on a regular basis is associated with reduced risk of type 2 diabetes.  Weight Loss  High fiber diets are more filling and give a sense of fullness sooner than an animal and meat based diet does.   Bacteria and the Function of the Colon  The colon finishes the digestive process. The waste products move through in a nice regular manner. Insoluble fibers help this process by retaining water and  so producing a bulkier, softer stool, which is easy to pass. The additional role of the colon is to provide a home for an enormous number of micro-organisms, mostly bacteria. These bacteria play a major role in keeping the colon wall itself healthy. In addition, these good bacteria produce a very strong immune system for the body. They significantly increase calcium absorption and bone density.     How Much is Enough?  The amount of fiber in food is measured in grams. National nutritional authorities recommend the following amounts of dietary fiber daily.    Under Age 50  Over Age 50   Men 38 grams  30 grams   Women 25 grams  21 grams     Which Fibers and Which Foods are Best?  As noted, healthy fiber is only found in plants. The three major categories are whole grains, fruits and vegetables.  Whole Grains  Wheat, oats, barley, wild or brown rice, amaranth, buckwheat, bulgur, corn, millet, quinoa, rye, and sorghum. By far, wheat, oats and wild or brown rice are most common. Always buy whole grain products.   Fruits  Fruits come from trees such as apple and pear or from bushes or giovanni. You should eat a wide variety of fruits, preferably with every meal. In many cases, the skin of a fruit such as apple will contain much of the insoluble fiber while the pulp contains most of the soluble fiber. To the extent possible, buy organic fruits as these will have little or no pesticides. Always wash fruit.  Vegetables  Eat a wide variety of vegetables. They should be a mainstay of lunch and dinners. Frozen vegetables retain as much nutrition and fiber as fresh vegetables. As with fruit, try to buy organic to reduce any residual pesticide ingestion. Wash fresh vegetables thoroughly.  Legumes, Beans, Peas and Soybeans  These vegetables have plenty of soluble fiber and should be part of a varied vegetable intake. Beans, in particular, contain a certain type of fiber that may lead to harmless gas or bloating.  Nuts and  Seeds  These are rich sources of fiber and are a good substitute for sweets such as candies and baked sweet goods.   Fiber and Gas  Everyone has intestinal gas and that is a good thing. It means that bacteria are thriving. The normal amount of flatus passed each day depends on gender and what is eaten. The normal number of flatus is 10-20 times a day. When the bacteria that make intestinal gases are growing, it also means that other good bacteria are using the same fibers to grow and produce multiple health benefits. Soluble fiber should always be used in a gradual manner. If too much is consumed at any one time, then excess, but harmless, intestinal gas can occur. People with irritable bowel syndrome are particularly prone to bloating and mild cramping. In this instance, soluble fiber in the diet or supplement should be used in small doses and increased gradually.      Metamucil    Put fiber powder into an empty glass and mix with 8 oz. of water or other cool liquid. Stir briskly and drink promptly. Take up to 2 times daily.  New Users: Start with 1 serving per day; gradually increase to desired daily intake. You may initially experience changes in bowel habits or minor bloating, as your body adjusts to increased fiber intake.  Bulk-forming fibers like psyllium husk may affect how well medicines work. Take this product at least 2 hours before or after medicines.                                      RUBBER BAND LIGATION POST PROCEDURE INSTRUCTIONS    This in-office procedure was performed to treat hemorrhoids that are inside of the anus. The doctor placed a small rubber band at the base of the hemorrhoid which will cut off the blood supply and cause the hemorrhoid to fall off in 5-7 days. The doctor may band 1 to 3 hemorrhoids per procedure. Multiple rounds of banding may be needed to completely eliminate symptoms.     Instructions:   You may experience a dull ache or feeling of pressure for 1-3 days after the  procedure.  You may notice a small amount of bleeding 5-7 days after the procedure, when the hemorrhoid falls off.  You may take Tylenol or Ibuprofen for pain after this procedure.   An ice pack may be applied within 24 hours after banding. After 24 hours, take warm sitz baths up to three times a day for 10-15 minutes each time.  Diet:  Continue or start taking any fiber supplement such as Metamucil or Citrucel.  Eat foods that are high in fiber and drink at least 6-8 glasses of water per day.   Activity:  You may continue your normal activities, unless advised otherwise by your physician.  You will be able to drive your car immediately, walk up stairs, and do normal exercise.  Please call our office at [phone] if you have questions or have the following issues:  Severe pain that does not lessen in 3 days  Increasing pain several days after treatment  Tender swelling in the anal area  Fever (over 101F) or chills  Difficulty urinating  Severe constipation (no bowel movement for 3 days)  Diarrhea (more than 3 watery stools within 24 hours)  Increased bleeding from rectum (more than a cupful)

## 2023-12-18 NOTE — CONSULTS
GYN:    Pt will require repeat imaging to document improvement with conservative management.    Clinically improved but elevated WBC still a concern    Rec CT Abd/Pelvis with contrast  oral and Ivin the next 72 hrs

## 2023-12-19 ENCOUNTER — APPOINTMENT (OUTPATIENT)
Dept: CT IMAGING | Facility: HOSPITAL | Age: 63
End: 2023-12-19
Payer: COMMERCIAL

## 2023-12-19 LAB
ALBUMIN SERPL-MCNC: 2.7 G/DL (ref 3.5–5.2)
ALBUMIN/GLOB SERPL: 0.8 G/DL
ALP SERPL-CCNC: 91 U/L (ref 39–117)
ALT SERPL W P-5'-P-CCNC: 14 U/L (ref 1–33)
ANION GAP SERPL CALCULATED.3IONS-SCNC: 10 MMOL/L (ref 5–15)
AST SERPL-CCNC: 23 U/L (ref 1–32)
BASOPHILS # BLD AUTO: 0.1 10*3/MM3 (ref 0–0.2)
BASOPHILS NFR BLD AUTO: 0.7 % (ref 0–1.5)
BILIRUB SERPL-MCNC: 0.2 MG/DL (ref 0–1.2)
BUN SERPL-MCNC: 6 MG/DL (ref 8–23)
BUN/CREAT SERPL: 9.8 (ref 7–25)
CALCIUM SPEC-SCNC: 8.5 MG/DL (ref 8.6–10.5)
CHLORIDE SERPL-SCNC: 102 MMOL/L (ref 98–107)
CO2 SERPL-SCNC: 24 MMOL/L (ref 22–29)
CREAT SERPL-MCNC: 0.61 MG/DL (ref 0.57–1)
DEPRECATED RDW RBC AUTO: 49.4 FL (ref 37–54)
EGFRCR SERPLBLD CKD-EPI 2021: 100.6 ML/MIN/1.73
EOSINOPHIL # BLD AUTO: 0.1 10*3/MM3 (ref 0–0.4)
EOSINOPHIL NFR BLD AUTO: 0.7 % (ref 0.3–6.2)
ERYTHROCYTE [DISTWIDTH] IN BLOOD BY AUTOMATED COUNT: 15 % (ref 12.3–15.4)
GLOBULIN UR ELPH-MCNC: 3.5 GM/DL
GLUCOSE SERPL-MCNC: 110 MG/DL (ref 65–99)
HCT VFR BLD AUTO: 22.1 % (ref 34–46.6)
HGB BLD-MCNC: 7.3 G/DL (ref 12–15.9)
LYMPHOCYTES # BLD AUTO: 1.7 10*3/MM3 (ref 0.7–3.1)
LYMPHOCYTES NFR BLD AUTO: 8.2 % (ref 19.6–45.3)
MCH RBC QN AUTO: 31 PG (ref 26.6–33)
MCHC RBC AUTO-ENTMCNC: 33 G/DL (ref 31.5–35.7)
MCV RBC AUTO: 93.7 FL (ref 79–97)
MONOCYTES # BLD AUTO: 1.4 10*3/MM3 (ref 0.1–0.9)
MONOCYTES NFR BLD AUTO: 6.7 % (ref 5–12)
NEUTROPHILS NFR BLD AUTO: 17.2 10*3/MM3 (ref 1.7–7)
NEUTROPHILS NFR BLD AUTO: 83.7 % (ref 42.7–76)
NRBC BLD AUTO-RTO: 0 /100 WBC (ref 0–0.2)
PLATELET # BLD AUTO: 355 10*3/MM3 (ref 140–450)
PMV BLD AUTO: 7.5 FL (ref 6–12)
POTASSIUM SERPL-SCNC: 3.6 MMOL/L (ref 3.5–5.2)
POTASSIUM SERPL-SCNC: 3.8 MMOL/L (ref 3.5–5.2)
PROT SERPL-MCNC: 6.2 G/DL (ref 6–8.5)
RBC # BLD AUTO: 2.36 10*6/MM3 (ref 3.77–5.28)
SODIUM SERPL-SCNC: 136 MMOL/L (ref 136–145)
WBC NRBC COR # BLD AUTO: 20.6 10*3/MM3 (ref 3.4–10.8)

## 2023-12-19 PROCEDURE — 85025 COMPLETE CBC W/AUTO DIFF WBC: CPT | Performed by: STUDENT IN AN ORGANIZED HEALTH CARE EDUCATION/TRAINING PROGRAM

## 2023-12-19 PROCEDURE — 25010000002 CEFTRIAXONE PER 250 MG: Performed by: INTERNAL MEDICINE

## 2023-12-19 PROCEDURE — 99232 SBSQ HOSP IP/OBS MODERATE 35: CPT | Performed by: STUDENT IN AN ORGANIZED HEALTH CARE EDUCATION/TRAINING PROGRAM

## 2023-12-19 PROCEDURE — 25010000002 METRONIDAZOLE 500 MG/100ML SOLUTION: Performed by: INTERNAL MEDICINE

## 2023-12-19 PROCEDURE — 80053 COMPREHEN METABOLIC PANEL: CPT | Performed by: STUDENT IN AN ORGANIZED HEALTH CARE EDUCATION/TRAINING PROGRAM

## 2023-12-19 PROCEDURE — 86900 BLOOD TYPING SEROLOGIC ABO: CPT

## 2023-12-19 PROCEDURE — 36430 TRANSFUSION BLD/BLD COMPNT: CPT

## 2023-12-19 PROCEDURE — 74177 CT ABD & PELVIS W/CONTRAST: CPT

## 2023-12-19 PROCEDURE — 25510000001 IOPAMIDOL PER 1 ML: Performed by: INTERNAL MEDICINE

## 2023-12-19 PROCEDURE — 84132 ASSAY OF SERUM POTASSIUM: CPT | Performed by: INTERNAL MEDICINE

## 2023-12-19 PROCEDURE — 86923 COMPATIBILITY TEST ELECTRIC: CPT

## 2023-12-19 PROCEDURE — P9016 RBC LEUKOCYTES REDUCED: HCPCS

## 2023-12-19 PROCEDURE — 25010000002 PIPERACILLIN SOD-TAZOBACTAM PER 1 G: Performed by: STUDENT IN AN ORGANIZED HEALTH CARE EDUCATION/TRAINING PROGRAM

## 2023-12-19 PROCEDURE — 25010000002 HYDROMORPHONE 1 MG/ML SOLUTION: Performed by: STUDENT IN AN ORGANIZED HEALTH CARE EDUCATION/TRAINING PROGRAM

## 2023-12-19 RX ORDER — METRONIDAZOLE 500 MG/100ML
500 INJECTION, SOLUTION INTRAVENOUS EVERY 8 HOURS SCHEDULED
Status: COMPLETED | OUTPATIENT
Start: 2023-12-19 | End: 2023-12-24

## 2023-12-19 RX ORDER — POTASSIUM CHLORIDE 20 MEQ/1
40 TABLET, EXTENDED RELEASE ORAL EVERY 4 HOURS
Status: COMPLETED | OUTPATIENT
Start: 2023-12-19 | End: 2023-12-19

## 2023-12-19 RX ADMIN — Medication 10 ML: at 20:31

## 2023-12-19 RX ADMIN — ACETAMINOPHEN 650 MG: 325 TABLET, FILM COATED ORAL at 15:12

## 2023-12-19 RX ADMIN — SENNOSIDES AND DOCUSATE SODIUM 2 TABLET: 8.6; 5 TABLET ORAL at 20:31

## 2023-12-19 RX ADMIN — HYDROMORPHONE HYDROCHLORIDE 1 MG: 1 INJECTION, SOLUTION INTRAMUSCULAR; INTRAVENOUS; SUBCUTANEOUS at 04:53

## 2023-12-19 RX ADMIN — POTASSIUM CHLORIDE 40 MEQ: 1500 TABLET, EXTENDED RELEASE ORAL at 04:48

## 2023-12-19 RX ADMIN — PANTOPRAZOLE SODIUM 40 MG: 40 TABLET, DELAYED RELEASE ORAL at 05:01

## 2023-12-19 RX ADMIN — IOPAMIDOL 100 ML: 755 INJECTION, SOLUTION INTRAVENOUS at 13:36

## 2023-12-19 RX ADMIN — LEVOTHYROXINE SODIUM 25 MCG: 25 TABLET ORAL at 04:57

## 2023-12-19 RX ADMIN — PANTOPRAZOLE SODIUM 40 MG: 40 TABLET, DELAYED RELEASE ORAL at 04:57

## 2023-12-19 RX ADMIN — ESCITALOPRAM OXALATE 20 MG: 10 TABLET ORAL at 08:15

## 2023-12-19 RX ADMIN — PIPERACILLIN AND TAZOBACTAM 3.38 G: 3; .375 INJECTION, POWDER, FOR SOLUTION INTRAVENOUS at 08:16

## 2023-12-19 RX ADMIN — HYDROMORPHONE HYDROCHLORIDE 1 MG: 1 INJECTION, SOLUTION INTRAMUSCULAR; INTRAVENOUS; SUBCUTANEOUS at 18:14

## 2023-12-19 RX ADMIN — LEVOTHYROXINE SODIUM 25 MCG: 25 TABLET ORAL at 05:00

## 2023-12-19 RX ADMIN — METRONIDAZOLE 500 MG: 500 INJECTION, SOLUTION INTRAVENOUS at 18:11

## 2023-12-19 RX ADMIN — SENNOSIDES AND DOCUSATE SODIUM 2 TABLET: 8.6; 5 TABLET ORAL at 08:16

## 2023-12-19 RX ADMIN — CEFTRIAXONE 2000 MG: 2 INJECTION, POWDER, FOR SOLUTION INTRAMUSCULAR; INTRAVENOUS at 21:17

## 2023-12-19 RX ADMIN — POTASSIUM CHLORIDE 40 MEQ: 1500 TABLET, EXTENDED RELEASE ORAL at 08:15

## 2023-12-19 RX ADMIN — DOXYCYCLINE 100 MG: 100 INJECTION, POWDER, LYOPHILIZED, FOR SOLUTION INTRAVENOUS at 20:26

## 2023-12-19 NOTE — CASE MANAGEMENT/SOCIAL WORK
Continued Stay Note  YAN[facility] Dexter     Patient Name: Charity Mcclure  MRN: [id number]  Today's Date: 12/19/2023    Admit Date: 12/16/2023    Plan: Home. Family can transport at discharge.   Discharge Plan       Row Name 12/19/23 1417       Plan    Plan Home. Family can transport at discharge.    Plan Comments Dc barriers: increasing Wbc, hgb 7.3, hypotension, 1 unit prbc's , CT abd/pelvis                    Sindy Hebert RN

## 2023-12-19 NOTE — CONSULTS
Pt not responding with increasing WBC, temp  Spoke with IR and they do not recommend percutaneous drainage based upon location noted on outside films    Rec  Rescan            CT abd/pelvis with oral/IV contrast    Will adjust care accordingly

## 2023-12-19 NOTE — PROGRESS NOTES
"[facility] MEDICINE SERVICE  DAILY PROGRESS NOTE      Patient Name: Charity Mcclure  Date of Admission: 12/16/2023  Today's Date: 12/19/23  Length of Stay: 3  Primary Care Physician: Meron Whittaker MD    Subjective   Patient is doing well at this time.  She had some diaphoresis overnight, but states she feels much better today.  She continues to have mild LLQ pain. No other complaints or events.      Objective    Temp:  [97.7 °F (36.5 °C)-99 °F (37.2 °C)] 97.7 °F (36.5 °C)  Heart Rate:  [77-92] 77  Resp:  [14-19] 16  BP: ()/(51-61) 88/51  Physical Exam  General: NAD, AAOx3  HEENT: AT NC, EOMI  Neck: No JVD  CVS: S1/S2 present, RRR, no M/R/G  Lungs: CTA B/L  Abdomen: soft, minimally tender at LLQ, ND, BS+  Ext: no edema  Skin: no rashes, bruises or discolorations  Neuro: no focal deficits  Psych: Not agitated       Results Review:  I have reviewed the labs, radiology results, and diagnostic studies.    Laboratory Data:   Results from last 7 days   Lab Units 12/19/23  0216 12/18/23  0401 12/17/23  0129   WBC 10*3/mm3 20.60* 18.60* 19.20*   HEMOGLOBIN g/dL 7.3* 7.6* 7.4*   HEMATOCRIT % 22.1* 23.4* 22.7*   PLATELETS 10*3/mm3 355 328 341        Results from last 7 days   Lab Units 12/19/23  0216 12/18/23  0401 12/17/23  1540 12/17/23  0129   SODIUM mmol/L 136 136  --  139   POTASSIUM mmol/L 3.6 4.1 4.7 3.6   CHLORIDE mmol/L 102 99  --  101   CO2 mmol/L 24.0 23.0  --  25.0   BUN mg/dL 6* 4*  --  6*   CREATININE mg/dL 0.61 0.62  --  0.60   CALCIUM mg/dL 8.5* 8.7  --  8.7   BILIRUBIN mg/dL 0.2 0.4  --  0.4   ALK PHOS U/L 91 96  --  137*   ALT (SGPT) U/L 14 12  --  11   AST (SGOT) U/L 23 17  --  18   GLUCOSE mg/dL 110* 90  --  95       Culture Data:   No results found for: \"BLOODCX\"  No results found for: \"URINECX\"    No results found for: \"RESPCX\"  No results found for: \"WOUNDCX\"  No results found for: \"STOOLCX\"  No components found for: \"BODYFLD\"    Radiology Data:   Imaging Results (Last 24 Hours)  "      ** No results found for the last 24 hours. **            I have reviewed the patient's current medications.     Assessment/Plan     1) diverticulitis/tubo-ovarian abscess  - seen on imaging  - colorectal surgery on board, appreciate recs  - OB/GYN consulted, appreciate recs  - recommend conservative management for now  - zosyn  - repeat imaging pending per OB    2) HTN  - home meds    3) hypothyroidism  - synthroid    4) mood disorders  - home meds    5) GI/DVT ppx  - protonix/SCDs    6) disposition  - pending specialist clearance        Electronically signed by Dana Josue MD, 12/19/23, 09:09 EST.

## 2023-12-19 NOTE — PROGRESS NOTES
Subjective .     History of present illness:    64 y/o  with TOA and concern for extension to diverticulum versus perforated diverticulum.   This patient was admitted on Saturday and has been treated with IV antibiotics since admission for suspected TOA.   Patient reports that over the past week she has had worsened pain on left side, and initially thought it was abdominal pain associated with constipation s/p recent hip surgery. But she reports pain increased with also fever and chills, and she suspected respiratory infection. Since admission, she has had some improvement in pain with IV narcotic therapy and reports pain is worst with PO and mostly located in LLQ. She denies any significant GYN history, she had prior CD, but no other abdominal surgeries. She denies any history of abnormal pap smears, reports last pap within past 5 years. Sexually active with  only. She denies any PMB.     She has no other significant medical history besides HTN that has been well controlled on lisinopril and thyroid disease.     Patient Active Problem List   Diagnosis    Perforated diverticulum    Mood disorder    Hypertension    Disease of thyroid gland    Tubo-ovarian mass       Past Medical History:   Diagnosis Date    Disease of thyroid gland 2023    Hypertension 2023       History reviewed. No pertinent surgical history.    No obstetric history on file.    No Known Allergies      Objective     Vital Signs   Vitals:    23 0818 23 1145 23 1436 23 1505   BP: (!) 88/51 99/64 125/70 121/76   BP Location:  Right arm     Patient Position:  Lying     Pulse: 77 76 82 82   Resp:  17 26 13   Temp:  98.4 °F (36.9 °C) 99.5 °F (37.5 °C) (!) 100.7 °F (38.2 °C)   TempSrc:  Oral Oral Oral   SpO2:  94% 97% 97%   Weight:       Height:         Temp (24hrs), Av.1 °F (37.3 °C), Min:97.7 °F (36.5 °C), Max:100.7 °F (38.2 °C)      Physical Exam:     General Appearance:    Alert, cooperative, in no  acute distress   Abdomen:     Normal bowel sounds, no palpable masses, no organomegaly, ttp in LLQ, pos guarding, no rebound                 tenderness   Genitalia:    Deferred at this time  Ext: No LE edema, nttp     Lab Results:  Lab Results (last 48 hours)       Procedure Component Value Units Date/Time    Potassium [[id number]]  (Normal) Collected: 12/19/23 1422    Specimen: Blood from Arm, Left Updated: 12/19/23 1440     Potassium 3.8 mmol/L     Comprehensive Metabolic Panel [[id number]]  (Abnormal) Collected: 12/19/23 0216    Specimen: Blood Updated: 12/19/23 0307     Glucose 110 mg/dL      BUN 6 mg/dL      Creatinine 0.61 mg/dL      Sodium 136 mmol/L      Potassium 3.6 mmol/L      Chloride 102 mmol/L      CO2 24.0 mmol/L      Calcium 8.5 mg/dL      Total Protein 6.2 g/dL      Albumin 2.7 g/dL      ALT (SGPT) 14 U/L      AST (SGOT) 23 U/L      Alkaline Phosphatase 91 U/L      Total Bilirubin 0.2 mg/dL      Globulin 3.5 gm/dL      A/G Ratio 0.8 g/dL      BUN/Creatinine Ratio 9.8     Anion Gap 10.0 mmol/L      eGFR 100.6 mL/min/1.73     Narrative:      GFR Normal >60  Chronic Kidney Disease <60  Kidney Failure <15      CBC Auto Differential [[id number]]  (Abnormal) Collected: 12/19/23 0216    Specimen: Blood Updated: 12/19/23 0225     WBC 20.60 10*3/mm3      RBC 2.36 10*6/mm3      Hemoglobin 7.3 g/dL      Hematocrit 22.1 %      MCV 93.7 fL      MCH 31.0 pg      MCHC 33.0 g/dL      RDW 15.0 %      RDW-SD 49.4 fl      MPV 7.5 fL      Platelets 355 10*3/mm3      Neutrophil % 83.7 %      Lymphocyte % 8.2 %      Monocyte % 6.7 %      Eosinophil % 0.7 %      Basophil % 0.7 %      Neutrophils, Absolute 17.20 10*3/mm3      Lymphocytes, Absolute 1.70 10*3/mm3      Monocytes, Absolute 1.40 10*3/mm3      Eosinophils, Absolute 0.10 10*3/mm3      Basophils, Absolute 0.10 10*3/mm3      nRBC 0.0 /100 WBC     Comprehensive Metabolic Panel [[id number]]  (Abnormal) Collected: 12/18/23 0401    Specimen: Blood Updated: 12/18/23 0774      Glucose 90 mg/dL      BUN 4 mg/dL      Creatinine 0.62 mg/dL      Sodium 136 mmol/L      Potassium 4.1 mmol/L      Chloride 99 mmol/L      CO2 23.0 mmol/L      Calcium 8.7 mg/dL      Total Protein 6.2 g/dL      Albumin 2.7 g/dL      ALT (SGPT) 12 U/L      AST (SGOT) 17 U/L      Alkaline Phosphatase 96 U/L      Total Bilirubin 0.4 mg/dL      Globulin 3.5 gm/dL      A/G Ratio 0.8 g/dL      BUN/Creatinine Ratio 6.5     Anion Gap 14.0 mmol/L      eGFR 100.2 mL/min/1.73     Narrative:      GFR Normal >60  Chronic Kidney Disease <60  Kidney Failure <15      Protime-INR [[id number]]  (Abnormal) Collected: 12/18/23 0401    Specimen: Blood Updated: 12/18/23 0441     Protime 12.3 Seconds      INR 1.14    CBC Auto Differential [[id number]]  (Abnormal) Collected: 12/18/23 0401    Specimen: Blood Updated: 12/18/23 0433     WBC 18.60 10*3/mm3      RBC 2.52 10*6/mm3      Hemoglobin 7.6 g/dL      Hematocrit 23.4 %      MCV 92.5 fL      MCH 30.2 pg      MCHC 32.6 g/dL      RDW 15.1 %      RDW-SD 52.5 fl      MPV 7.5 fL      Platelets 328 10*3/mm3      Neutrophil % 85.7 %      Lymphocyte % 7.2 %      Monocyte % 6.7 %      Eosinophil % 0.2 %      Basophil % 0.2 %      Neutrophils, Absolute 16.00 10*3/mm3      Lymphocytes, Absolute 1.30 10*3/mm3      Monocytes, Absolute 1.20 10*3/mm3      Eosinophils, Absolute 0.00 10*3/mm3      Basophils, Absolute 0.00 10*3/mm3      nRBC 0.1 /100 WBC     Potassium [[id number]]  (Normal) Collected: 12/17/23 1540    Specimen: Blood Updated: 12/17/23 1604     Potassium 4.7 mmol/L      Comment: Result checked                 Radiology Results:  Imaging Results (Last 72 Hours)       Procedure Component Value Units Date/Time    CT Abdomen Pelvis With Contrast [[id number]] Collected: 12/19/23 1338     Updated: 12/19/23 1351    Narrative:      CT ABDOMEN PELVIS W CONTRAST    Date of Exam: 12/19/2023 12:36 PM CST    Indication: Diverticulitis, complication suspected.    Comparison: Outside CT  12/15/2023    Technique: Axial CT images were obtained of the abdomen and pelvis following the uneventful intravenous administration of iodinated contrast. Sagittal and coronal reconstructions were performed.  Automated exposure control and iterative reconstruction   methods were used.        Findings:  LUNG BASES: Small bilateral pleural effusions with basal atelectasis.    LIVER:  Unremarkable parenchyma without focal lesion.  BILIARY/GALLBLADDER:  Unremarkable  SPLEEN: There is a punctate gallstone in the region of the gallbladder neck.  PANCREAS:  Unremarkable  ADRENAL:  Unremarkable  KIDNEYS:  Unremarkable parenchyma with no solid mass identified. No obstruction.  No calculus identified.  GASTROINTESTINAL/MESENTERY: There is mural thickening involving the sigmoid colon. No diverticular identified. Along the posterior margin of the mid to distal sigmoid colon is an irregular thick-walled rim-enhancing 6.5 x 6.2 cm cystic mass/fluid   collection with surrounding inflammatory change. While this is adjacent to the sigmoid colon this does not appear to arise directly from it. The surrounding inflammatory changes suggest this is inflammatory/infectious in etiology with differential   including tubo-ovarian abscess, perforated diverticulitis with resultant abscess, and ovarian neoplasm. Recommend surgical consultation. If no intervention performed then follow-up imaging recommended to ensure resolution. Ultimately, findings are   similar to previous CT. No evidence of intestinal obstruction.  MESENTERIC VESSELS:  Patent.  AORTA/IVC:  Normal caliber.    RETROPERITONEUM/LYMPH NODES: There is shotty retroperitoneal and left pelvic lymphadenopathy. There are no pathologically enlarged lymph nodes identified.    REPRODUCTIVE: The uterus and right ovary are unremarkable. There is a previously mentioned left central mid abdominal irregular rim-enhancing fluid collection/cystic mass with differential including tubo-ovarian  abscess and ovarian neoplasm.  BLADDER:  Unremarkable    OSSEUS STRUCTURES: There is total right hip arthroplasty with associated streak and beam hardening artifact. There is an irregular fluid collection on the anterior margin of the process the cysts posterior to the rectus femoris muscle, similar to   previous exam.    No free fluid currently.        Impression:      Impression:  1.There is a left anterior pelvic 6.5 x 6.2 cm irregular rim-enhancing fluid collection/cystic mass with surrounding inflammatory change. Imaging features suggest infection such as tubo-ovarian abscess or previous bout of perforated diverticulitis with   resultant abscess formation. No internal gas to confirm infection. However, left ovarian neoplasm is a consideration. Surgical consultation recommended. Follow-up imaging post therapy recommended to ensure resolution. Cystic mass is similar in size   compared to previous examination with resolution of previous free fluid.  2.There is mural thickening involving the mid to distal sigmoid colon related to the previously mentioned pelvic fluid collection/cystic mass.            Electronically Signed: Alber Mercedes MD    2023 12:49 PM CST    Workstation ID: [id number]            Assessment & Plan       Perforated diverticulum    Mood disorder    Hypertension    Disease of thyroid gland    Tubo-ovarian mass      64 y/o  presents with concern for TOA vs perforated diverticulum, currently on IV antibiotic therapy.     Tubo-ovarian abscess  - Patient has no significant RF for TOA  - Patient has currently been on triple IV abx therapy for tx for assumed TOA  - CT A/P repeated today and left sided 6.5x6.2 ring enhancing lesion connecting left ovary and fallopian tube  - IV abx is first line of therapy for TOA, and with the size, IV abx should allow for penetration, but with persistent leukocytosis and intermittent fever, surgical management for assessment can be considered. Radiation  consulted and report cannot drain abscess.   - Counseled patient on total robotic or laparoscopic hysterectomy, bilateral salpingo-oophorectomy, and lysis of adhesions. Discussed risk of bleeding, infection, damage to surrounding structures such as bowel, bladder, ureters, and risk of anesthesia: VTE and death.   - Counseled patient that if surgical management , will need to plan at a time when CRS present due to association with perforated diverticulum.   -Will discuss surgical planning with GYN partners: Dr. Robert and Dr. Staples for scheduling.   - Prior to surgery: will need to be NPO, will hold off on bowel prep at this time pending CRS recommendations for surgical planning, no NKDA for PPX abx but will continue scheduled abx at this time, SCDs/compression stockings for VTE PPX.     I discussed the patients findings and my recommendations with patient.     Juju Navarrete MD  12/19/23  16:00 EST

## 2023-12-19 NOTE — PLAN OF CARE
Goal Outcome Evaluation:   Painful this shift and pain managed per MAR. Still c/o ABD pain. OBGYN to consult today. Remains on IV abt. Plan of care ongoing

## 2023-12-19 NOTE — PROGRESS NOTES
Colorectal Surgery Progress Note    Pt. Name/Age/:  Charity Mcclure   63 y.o.    1960         Med. Record Number:   [id number]  Date of admission:  2023      Service(s): Colorectal Surgery      Subjective    Reports clinically doing better, minimal pain. However, Her systolic BP is in mid 80's this am, without tachycardia. Afebrile. Her leukocytosis worsen today to 20.60 from 18.6. Her Hgb is 7.3.     Objective  Vitals:     Patient Vitals for the past 24 hrs:   BP Temp Temp src Pulse Resp SpO2   23 0818 (!) 88/51 -- -- 77 -- --   23 0602 94/58 97.7 °F (36.5 °C) Oral -- 16 --   23 2149 94/56 99 °F (37.2 °C) Oral -- 19 --   23 1700 -- -- -- 84 -- --   23 1639 109/61 -- -- 92 16 96 %   23 1208 -- 98.3 °F (36.8 °C) Oral -- 14 --           Wt. Admission: Weight: 70.2 kg (154 lb 12.2 oz)     Wt. Current: Weight: 70.2 kg (154 lb 12.2 oz)   Body mass index is 29.24 kg/m².      I&O:  Intake/Output Summary (Last 24 hours) at 2023 1032  Last data filed at 2023 0451  Gross per 24 hour   Intake 2446.3 ml   Output --   Net 2446.3 ml      1901 -  0700  In: 2446.3 [P.O.:1560; I.V.:886.3]  Out: -       Exam  General:  No acute distress, resting comfortably.  Cardiovascular: regular rate.  Respiratory: no increased work of breathing.  Abdomen:  Soft, minimal tenderness in left lower quadrant improved from previous exams, no rebound or guarding     Extremities: warm, well-perfused extremities, no pitting edema.      Labs:     [unfilled]          Scheduled Meds:escitalopram, 20 mg, Oral, Daily  levothyroxine, 25 mcg, Oral, Q AM  lisinopril, 10 mg, Oral, Daily  pantoprazole, 40 mg, Oral, Q AM  piperacillin-tazobactam, 3.375 g, Intravenous, Q8H  senna-docusate sodium, 2 tablet, Oral, BID  sodium chloride, 10 mL, Intravenous, Q12H      Continuous Infusions:   PRN Meds:.  acetaminophen    senna-docusate sodium **AND** polyethylene glycol **AND** bisacodyl **AND**  bisacodyl    Calcium Replacement - Follow Nurse / BPA Driven Protocol    HYDROmorphone    ketorolac    Magnesium Standard Dose Replacement - Follow Nurse / BPA Driven Protocol    nitroglycerin    ondansetron **OR** ondansetron    oxyCODONE    Phosphorus Replacement - Follow Nurse / BPA Driven Protocol    Potassium Replacement - Follow Nurse / BPA Driven Protocol    sodium chloride    sodium chloride          Assessment  63 y.o. female with diverticulitis and a complex abscess likely tubo-ovarian       Plan / Recommendations    - given her hypotensive episodes and anemia, I have given her a unit of PRBC. Anemia likely from recent hip surgery and ongoing GI loss.   - I will follow her repeat CT A/P   - I am available if surgical intervention is plan by the OBGYN team   - continue with Antibiotics.         10:32 EST; 12/19/2023        Harvinder Vasquez MD  Colon and Rectal Surgery   [facility]

## 2023-12-20 ENCOUNTER — ANESTHESIA EVENT (OUTPATIENT)
Dept: PERIOP | Facility: HOSPITAL | Age: 63
End: 2023-12-20
Payer: COMMERCIAL

## 2023-12-20 LAB
ALBUMIN SERPL-MCNC: 3 G/DL (ref 3.5–5.2)
ALBUMIN SERPL-MCNC: 3.1 G/DL (ref 3.5–5.2)
ALBUMIN/GLOB SERPL: 0.8 G/DL
ALBUMIN/GLOB SERPL: 0.9 G/DL
ALP SERPL-CCNC: 90 U/L (ref 39–117)
ALP SERPL-CCNC: 90 U/L (ref 39–117)
ALT SERPL W P-5'-P-CCNC: 13 U/L (ref 1–33)
ALT SERPL W P-5'-P-CCNC: 14 U/L (ref 1–33)
ANION GAP SERPL CALCULATED.3IONS-SCNC: 10 MMOL/L (ref 5–15)
ANION GAP SERPL CALCULATED.3IONS-SCNC: 11 MMOL/L (ref 5–15)
AST SERPL-CCNC: 15 U/L (ref 1–32)
AST SERPL-CCNC: 18 U/L (ref 1–32)
BASOPHILS # BLD AUTO: 0.1 10*3/MM3 (ref 0–0.2)
BASOPHILS # BLD AUTO: 0.1 10*3/MM3 (ref 0–0.2)
BASOPHILS NFR BLD AUTO: 0.3 % (ref 0–1.5)
BASOPHILS NFR BLD AUTO: 0.4 % (ref 0–1.5)
BILIRUB SERPL-MCNC: 0.3 MG/DL (ref 0–1.2)
BILIRUB SERPL-MCNC: 0.4 MG/DL (ref 0–1.2)
BUN SERPL-MCNC: 6 MG/DL (ref 8–23)
BUN SERPL-MCNC: 7 MG/DL (ref 8–23)
BUN/CREAT SERPL: 11.1 (ref 7–25)
BUN/CREAT SERPL: 11.1 (ref 7–25)
CALCIUM SPEC-SCNC: 8.5 MG/DL (ref 8.6–10.5)
CALCIUM SPEC-SCNC: 9 MG/DL (ref 8.6–10.5)
CHLORIDE SERPL-SCNC: 100 MMOL/L (ref 98–107)
CHLORIDE SERPL-SCNC: 102 MMOL/L (ref 98–107)
CO2 SERPL-SCNC: 24 MMOL/L (ref 22–29)
CO2 SERPL-SCNC: 25 MMOL/L (ref 22–29)
CREAT SERPL-MCNC: 0.54 MG/DL (ref 0.57–1)
CREAT SERPL-MCNC: 0.63 MG/DL (ref 0.57–1)
DEPRECATED RDW RBC AUTO: 52.5 FL (ref 37–54)
DEPRECATED RDW RBC AUTO: 60.8 FL (ref 37–54)
EGFRCR SERPLBLD CKD-EPI 2021: 103.6 ML/MIN/1.73
EGFRCR SERPLBLD CKD-EPI 2021: 99.8 ML/MIN/1.73
EOSINOPHIL # BLD AUTO: 0.1 10*3/MM3 (ref 0–0.4)
EOSINOPHIL # BLD AUTO: 0.1 10*3/MM3 (ref 0–0.4)
EOSINOPHIL NFR BLD AUTO: 0.7 % (ref 0.3–6.2)
EOSINOPHIL NFR BLD AUTO: 0.7 % (ref 0.3–6.2)
ERYTHROCYTE [DISTWIDTH] IN BLOOD BY AUTOMATED COUNT: 16.5 % (ref 12.3–15.4)
ERYTHROCYTE [DISTWIDTH] IN BLOOD BY AUTOMATED COUNT: 17.2 % (ref 12.3–15.4)
GLOBULIN UR ELPH-MCNC: 3.5 GM/DL
GLOBULIN UR ELPH-MCNC: 3.8 GM/DL
GLUCOSE SERPL-MCNC: 105 MG/DL (ref 65–99)
GLUCOSE SERPL-MCNC: 116 MG/DL (ref 65–99)
HCT VFR BLD AUTO: 28.9 % (ref 34–46.6)
HCT VFR BLD AUTO: 29.1 % (ref 34–46.6)
HGB BLD-MCNC: 9.3 G/DL (ref 12–15.9)
HGB BLD-MCNC: 9.3 G/DL (ref 12–15.9)
LYMPHOCYTES # BLD AUTO: 1.6 10*3/MM3 (ref 0.7–3.1)
LYMPHOCYTES # BLD AUTO: 1.9 10*3/MM3 (ref 0.7–3.1)
LYMPHOCYTES NFR BLD AUTO: 11 % (ref 19.6–45.3)
LYMPHOCYTES NFR BLD AUTO: 9 % (ref 19.6–45.3)
MCH RBC QN AUTO: 29.3 PG (ref 26.6–33)
MCH RBC QN AUTO: 30.1 PG (ref 26.6–33)
MCHC RBC AUTO-ENTMCNC: 32 G/DL (ref 31.5–35.7)
MCHC RBC AUTO-ENTMCNC: 32.3 G/DL (ref 31.5–35.7)
MCV RBC AUTO: 90.8 FL (ref 79–97)
MCV RBC AUTO: 94.1 FL (ref 79–97)
MONOCYTES # BLD AUTO: 1.3 10*3/MM3 (ref 0.1–0.9)
MONOCYTES # BLD AUTO: 1.4 10*3/MM3 (ref 0.1–0.9)
MONOCYTES NFR BLD AUTO: 7.2 % (ref 5–12)
MONOCYTES NFR BLD AUTO: 8.3 % (ref 5–12)
NEUTROPHILS NFR BLD AUTO: 13.4 10*3/MM3 (ref 1.7–7)
NEUTROPHILS NFR BLD AUTO: 14.6 10*3/MM3 (ref 1.7–7)
NEUTROPHILS NFR BLD AUTO: 79.6 % (ref 42.7–76)
NEUTROPHILS NFR BLD AUTO: 82.8 % (ref 42.7–76)
NRBC BLD AUTO-RTO: 0 /100 WBC (ref 0–0.2)
NRBC BLD AUTO-RTO: 0 /100 WBC (ref 0–0.2)
PLATELET # BLD AUTO: 355 10*3/MM3 (ref 140–450)
PLATELET # BLD AUTO: 385 10*3/MM3 (ref 140–450)
PMV BLD AUTO: 7.4 FL (ref 6–12)
PMV BLD AUTO: 7.6 FL (ref 6–12)
POTASSIUM SERPL-SCNC: 3.8 MMOL/L (ref 3.5–5.2)
POTASSIUM SERPL-SCNC: 4 MMOL/L (ref 3.5–5.2)
PROT SERPL-MCNC: 6.5 G/DL (ref 6–8.5)
PROT SERPL-MCNC: 6.9 G/DL (ref 6–8.5)
RBC # BLD AUTO: 3.1 10*6/MM3 (ref 3.77–5.28)
RBC # BLD AUTO: 3.19 10*6/MM3 (ref 3.77–5.28)
SODIUM SERPL-SCNC: 135 MMOL/L (ref 136–145)
SODIUM SERPL-SCNC: 137 MMOL/L (ref 136–145)
WBC NRBC COR # BLD AUTO: 16.9 10*3/MM3 (ref 3.4–10.8)
WBC NRBC COR # BLD AUTO: 17.7 10*3/MM3 (ref 3.4–10.8)

## 2023-12-20 PROCEDURE — 25010000002 HYDROMORPHONE 1 MG/ML SOLUTION: Performed by: STUDENT IN AN ORGANIZED HEALTH CARE EDUCATION/TRAINING PROGRAM

## 2023-12-20 PROCEDURE — 25010000002 CEFTRIAXONE PER 250 MG: Performed by: INTERNAL MEDICINE

## 2023-12-20 PROCEDURE — 25010000002 METRONIDAZOLE 500 MG/100ML SOLUTION: Performed by: INTERNAL MEDICINE

## 2023-12-20 PROCEDURE — 85025 COMPLETE CBC W/AUTO DIFF WBC: CPT | Performed by: STUDENT IN AN ORGANIZED HEALTH CARE EDUCATION/TRAINING PROGRAM

## 2023-12-20 PROCEDURE — 86304 IMMUNOASSAY TUMOR CA 125: CPT | Performed by: STUDENT IN AN ORGANIZED HEALTH CARE EDUCATION/TRAINING PROGRAM

## 2023-12-20 PROCEDURE — 85025 COMPLETE CBC W/AUTO DIFF WBC: CPT | Performed by: INTERNAL MEDICINE

## 2023-12-20 PROCEDURE — 99232 SBSQ HOSP IP/OBS MODERATE 35: CPT | Performed by: STUDENT IN AN ORGANIZED HEALTH CARE EDUCATION/TRAINING PROGRAM

## 2023-12-20 PROCEDURE — 80053 COMPREHEN METABOLIC PANEL: CPT | Performed by: STUDENT IN AN ORGANIZED HEALTH CARE EDUCATION/TRAINING PROGRAM

## 2023-12-20 RX ORDER — NEOMYCIN SULFATE 500 MG/1
500 TABLET ORAL ONCE
Status: COMPLETED | OUTPATIENT
Start: 2023-12-20 | End: 2023-12-20

## 2023-12-20 RX ORDER — NEOMYCIN SULFATE 500 MG/1
1000 TABLET ORAL ONCE
Status: COMPLETED | OUTPATIENT
Start: 2023-12-20 | End: 2023-12-20

## 2023-12-20 RX ORDER — ERYTHROMYCIN 250 MG/1
500 CAPSULE, DELAYED RELEASE ORAL ONCE
Status: COMPLETED | OUTPATIENT
Start: 2023-12-20 | End: 2023-12-20

## 2023-12-20 RX ADMIN — ERYTHROMYCIN 500 MG: 250 CAPSULE, DELAYED RELEASE PELLETS ORAL at 12:43

## 2023-12-20 RX ADMIN — METRONIDAZOLE 500 MG: 500 INJECTION, SOLUTION INTRAVENOUS at 01:45

## 2023-12-20 RX ADMIN — HYDROMORPHONE HYDROCHLORIDE 1 MG: 1 INJECTION, SOLUTION INTRAMUSCULAR; INTRAVENOUS; SUBCUTANEOUS at 03:23

## 2023-12-20 RX ADMIN — NEOMYCIN SULFATE 1000 MG: 500 TABLET ORAL at 10:27

## 2023-12-20 RX ADMIN — NEOMYCIN SULFATE 500 MG: 500 TABLET ORAL at 12:44

## 2023-12-20 RX ADMIN — Medication 10 ML: at 08:32

## 2023-12-20 RX ADMIN — METRONIDAZOLE 500 MG: 500 INJECTION, SOLUTION INTRAVENOUS at 14:19

## 2023-12-20 RX ADMIN — HYDROMORPHONE HYDROCHLORIDE 1 MG: 1 INJECTION, SOLUTION INTRAMUSCULAR; INTRAVENOUS; SUBCUTANEOUS at 22:44

## 2023-12-20 RX ADMIN — DOXYCYCLINE 100 MG: 100 INJECTION, POWDER, LYOPHILIZED, FOR SOLUTION INTRAVENOUS at 08:29

## 2023-12-20 RX ADMIN — NEOMYCIN SULFATE 500 MG: 500 TABLET ORAL at 11:30

## 2023-12-20 RX ADMIN — CEFTRIAXONE 2000 MG: 2 INJECTION, POWDER, FOR SOLUTION INTRAMUSCULAR; INTRAVENOUS at 19:26

## 2023-12-20 RX ADMIN — ERYTHROMYCIN 500 MG: 250 CAPSULE, DELAYED RELEASE PELLETS ORAL at 10:27

## 2023-12-20 RX ADMIN — DOXYCYCLINE 100 MG: 100 INJECTION, POWDER, LYOPHILIZED, FOR SOLUTION INTRAVENOUS at 20:10

## 2023-12-20 RX ADMIN — HYDROMORPHONE HYDROCHLORIDE 1 MG: 1 INJECTION, SOLUTION INTRAMUSCULAR; INTRAVENOUS; SUBCUTANEOUS at 12:47

## 2023-12-20 RX ADMIN — METRONIDAZOLE 500 MG: 500 INJECTION, SOLUTION INTRAVENOUS at 22:38

## 2023-12-20 RX ADMIN — Medication 10 ML: at 20:10

## 2023-12-20 RX ADMIN — ACETAMINOPHEN 650 MG: 325 TABLET, FILM COATED ORAL at 20:57

## 2023-12-20 RX ADMIN — POLYETHYLENE GLYCOL-3350 AND ELECTROLYTES 4000 ML: 236; 6.74; 5.86; 2.97; 22.74 POWDER, FOR SOLUTION ORAL at 10:28

## 2023-12-20 RX ADMIN — ERYTHROMYCIN 500 MG: 250 CAPSULE, DELAYED RELEASE PELLETS ORAL at 11:30

## 2023-12-20 RX ADMIN — HYDROMORPHONE HYDROCHLORIDE 1 MG: 1 INJECTION, SOLUTION INTRAMUSCULAR; INTRAVENOUS; SUBCUTANEOUS at 20:17

## 2023-12-20 RX ADMIN — HYDROMORPHONE HYDROCHLORIDE 1 MG: 1 INJECTION, SOLUTION INTRAMUSCULAR; INTRAVENOUS; SUBCUTANEOUS at 17:05

## 2023-12-20 NOTE — PLAN OF CARE
Goal Outcome Evaluation: Pt remains on IV abt. Remains NPO at MN for possible surgery. Pt denies any pain so far this. Plan of care ongoing

## 2023-12-20 NOTE — PROGRESS NOTES
Pelvic Abscess    Does not appear to have perforation    Pt is stable but continues to have pain, leukocytosis and fever    Rec Extirpation of infected tissues     Discussed with patient robotic evaluation of the pelvis        Will attempt to remove just left adnexus, but may require total hysterectomy and bilateral adnexectomy        [place]-Rectal to evaluate for any need to resect colon if involved    No console time available today  Scheduled for 4 PM tomorrow afternoonb

## 2023-12-20 NOTE — PROGRESS NOTES
[facility] Medicine Services Daily Progress Note    Patient Name: Charity Mcclure  : 1960  MRN: [id number]  Primary Care Physician:  Meron Whittaker MD  Date of admission: 2023      Subjective      Chief Complaint: Abdominal Pain     Brief interim history: 63-year-old hypertension, hypothyroidism, hypertension, mood disorder, and right hip repair.  Patient was admitted on 2023  presented to [facility] ED, complaining of abdominal pain with subjective fever chills and malaise.  Patient initially thought she might have had a flu.  However, she continues to feel bad and developed nonbloody diarrhea, and poor oral intake.  Was initially seen at [facility] ED and CT of the abdomen/pelvis at that facility revealed perforated diverticulum with tubo-ovarian mass, concerning for possible abscess.  Laboratories notable for leukocytosis with WBC of 21 K, CT of the abdomen/pelvis with contrast (), showed a left anterior pelvis 6.5 x 6.2 cm irregular rim enhancing fluid collection/cystic mass with surrounding inflammatory changes, and imaging features suggest infection such as tubal ovarian abscess or previous bout of perforated diverticulitis with resultant abscess formation.   She is admitted with principal diagnosis of suspected diverticulitis/tubo-ovarian abscess.  She was seen in consultation by OB/GYN and general surgery with recommendations, and plan for surgical intervention tentatively scheduled for tomorrow.    2023: Seen and examined in follow-up.  Complain of mild lower abdominal quadrant discomfort, otherwise, no events reported overnight.    Vitals:   Temp:  [97.5 °F (36.4 °C)-99 °F (37.2 °C)] 99 °F (37.2 °C)  Heart Rate:  [71-75] 75  Resp:  [12-24] 24  BP: ()/(61-80) 137/80    Physical Exam  Constitutional:       General: She is not in acute distress.     Appearance: She is obese. She is not ill-appearing.   HENT:      Head: Normocephalic.       Right Ear: Tympanic membrane normal.      Nose: Nose normal.   Eyes:      Pupils: Pupils are equal, round, and reactive to light.   Cardiovascular:      Rate and Rhythm: Normal rate.      Pulses: Normal pulses.   Pulmonary:      Effort: Pulmonary effort is normal.   Abdominal:      Palpations: Abdomen is soft.   Musculoskeletal:         General: Normal range of motion.      Cervical back: Neck supple.   Skin:     General: Skin is warm.   Neurological:      General: No focal deficit present.      Mental Status: She is alert.             Result Review    Result Review:  I have personally reviewed the results from the time of this admission to 12/20/2023 18:28 EST and agree with these findings:  []  Laboratory  []  Microbiology  []  Radiology  []  EKG/Telemetry   []  Cardiology/Vascular   []  Pathology  []  Old records  []  Other:  Most notable findings include:     Wounds (last 24 hours)       LDA Wound       Row Name 12/20/23 0830 12/20/23 0352 12/20/23 0009       Wound 12/16/23 Right anterior greater trochanter Incision    Wound - Properties Group Placement Date: 12/16/23  -AG Present on Original Admission: N  -AG Side: Right  -AG Orientation: anterior  -AG Location: greater trochanter  -AG Primary Wound Type: Incision  -AG Wound Outcome: Other  -AG, healing     Dressing Appearance dry;intact;open to air  -DV dry;intact;open to air  -AJ intact;dry;open to air  -AJ    Dressing Care open to air  -DV -- --    Retired Wound - Properties Group Placement Date: 12/16/23  -AG Present on Original Admission: N  -AG Side: Right  -AG Orientation: anterior  -AG Location: greater trochanter  -AG Primary Wound Type: Incision  -AG Wound Outcome: Other  -AG, healing     Retired Wound - Properties Group Date first assessed: 12/16/23  -AG Present on Original Admission: N  -AG Side: Right  -AG Location: greater trochanter  -AG Primary Wound Type: Incision  -AG Wound Outcome: Other  -AG, healing       Row Name 12/19/23 1929              Wound 12/16/23 Right anterior greater trochanter Incision    Wound - Properties Group Placement Date: 12/16/23  -AG Present on Original Admission: N  -AG Side: Right  -AG Orientation: anterior  -AG Location: greater trochanter  -AG Primary Wound Type: Incision  -AG Wound Outcome: Other  -AG, healing     Dressing Appearance open to air;intact;dry  -AJ      Dressing Care open to air  -AJ      Retired Wound - Properties Group Placement Date: 12/16/23  -AG Present on Original Admission: N  -AG Side: Right  -AG Orientation: anterior  -AG Location: greater trochanter  -AG Primary Wound Type: Incision  -AG Wound Outcome: Other  -AG, healing     Retired Wound - Properties Group Date first assessed: 12/16/23  -AG Present on Original Admission: N  -AG Side: Right  -AG Location: greater trochanter  -AG Primary Wound Type: Incision  -AG Wound Outcome: Other  -AG, healing               User Key  (r) = Recorded By, (t) = Taken By, (c) = Cosigned By      Initials Name Provider Type    Amelia Blackwood LPN Licensed Nurse    Belinda Lugo RN Registered Nurse    Paige Mathews LPN Licensed Nurse                      Assessment & Plan        cefTRIAXone, 2,000 mg, Intravenous, Daily  doxycycline, 100 mg, Intravenous, Q12H  escitalopram, 20 mg, Oral, Daily  levothyroxine, 25 mcg, Oral, Q AM  lisinopril, 10 mg, Oral, Daily  metroNIDAZOLE, 500 mg, Intravenous, Q8H  pantoprazole, 40 mg, Oral, Q AM  senna-docusate sodium, 2 tablet, Oral, BID  sodium chloride, 10 mL, Intravenous, Q12H             Active Hospital Problems:  Active Hospital Problems    Diagnosis     **Perforated diverticulum     Mood disorder     Hypertension     Disease of thyroid gland     Tubo-ovarian mass        Assessment/plan:    Diverticulitis/tubo-ovarian abscess      -supportive care, pain control, Rocephin/doxycycline/Flagyl       -urine culture showed no growth        -plan to take the OR in a.m. OB/GYN/general surgery on board    Hypertension      -Lisinopril    Hypothyroidism     -Synthroid    Depression      -Lexapro    DVT prophylaxis:  Mechanical DVT prophylaxis orders are present.    CODE STATUS:    Level Of Support Discussed With: Patient  Code Status (Patient has no pulse and is not breathing): CPR (Attempt to Resuscitate)  Medical Interventions (Patient has pulse or is breathing): Full Support      Disposition:  I expect patient to be discharged 4 to 5 days.    This patient has been  and discussed with . 12/20/23      Electronically signed by Dg Payne MD, 12/20/23, 18:28 EST.  [facility] Hospitalist Team

## 2023-12-20 NOTE — PROGRESS NOTES
Colorectal Surgery Progress Note    Pt. Name/Age/:  Charity Mcclure   63 y.o.    1960         Med. Record Number:   [id number]  Date of admission:  2023      Service(s): Colorectal Surgery      Subjective    Doing well  Hgb responded appropriately after transfusion   CT scan with similar abscess/mass, likely tuboovarian, possible diverticulitis   Had a fever yesterday to max of 100.7  Plan for OR today     Objective  Vitals:     Patient Vitals for the past 24 hrs:   BP Temp Temp src Pulse Resp SpO2   23 0533 111/70 97.5 °F (36.4 °C) Oral -- 12 --   23 99/61 98.7 °F (37.1 °C) Oral 71 17 96 %   23 1800 99/67 98.4 °F (36.9 °C) Oral 75 18 95 %   23 1732 -- 99.8 °F (37.7 °C) Oral -- -- --   23 1628 -- 100.2 °F (37.9 °C) Oral 78 -- --   23 1621 -- (!) 100.6 °F (38.1 °C) Oral 75 -- --   23 1505 121/76 (!) 100.7 °F (38.2 °C) Oral 82 13 97 %   23 1436 125/70 99.5 °F (37.5 °C) Oral 82 26 97 %   23 1145 99/64 98.4 °F (36.9 °C) Oral 76 17 94 %           Wt. Admission: Weight: 70.2 kg (154 lb 12.2 oz)     Wt. Current: Weight: 70.2 kg (154 lb 12.2 oz)   Body mass index is 29.24 kg/m².      I&O:  Intake/Output Summary (Last 24 hours) at 2023  Last data filed at 2023 0533  Gross per 24 hour   Intake 2528.6 ml   Output --   Net 2528.6 ml      190 -  0700  In: 4254.9 [P.O.:0; I.V.:1864.9]  Out: -       Exam  General:  No acute distress, resting comfortably.  Cardiovascular: regular rate.  Respiratory: no increased work of breathing.  Abdomen:  Soft, mildly tender, no distension   Extremities: warm, well-perfused extremities, no pitting edema.        Scheduled Meds:cefTRIAXone, 2,000 mg, Intravenous, Daily  doxycycline, 100 mg, Intravenous, Q12H  erythromycin base, 500 mg, Oral, Once  erythromycin base, 500 mg, Oral, Once  erythromycin base, 500 mg, Oral, Once  escitalopram, 20 mg, Oral, Daily  levothyroxine, 25 mcg, Oral, Q  AM  lisinopril, 10 mg, Oral, Daily  metroNIDAZOLE, 500 mg, Intravenous, Q8H  neomycin, 1,000 mg, Oral, Once  neomycin, 500 mg, Oral, Once  neomycin, 500 mg, Oral, Once  pantoprazole, 40 mg, Oral, Q AM  polyethylene glycol, 4,000 mL, Oral, Once  senna-docusate sodium, 2 tablet, Oral, BID  sodium chloride, 10 mL, Intravenous, Q12H      Continuous Infusions:   PRN Meds:.  acetaminophen    senna-docusate sodium **AND** polyethylene glycol **AND** bisacodyl **AND** bisacodyl    Calcium Replacement - Follow Nurse / BPA Driven Protocol    HYDROmorphone    ketorolac    Magnesium Standard Dose Replacement - Follow Nurse / BPA Driven Protocol    nitroglycerin    ondansetron **OR** ondansetron    oxyCODONE    Phosphorus Replacement - Follow Nurse / BPA Driven Protocol    Potassium Replacement - Follow Nurse / BPA Driven Protocol    sodium chloride    sodium chloride          Assessment  63 y.o. female with diverticulitis and tubo-ovarian abscess     Plan / Recommendations    - reviewed her CT scan independently. Tuboovarian complex cystic mass/abscess with likely partial torsion. There is thickening of the colon with mesenteric strand. Likely from diverticulitis, possible secondary inflammation    - I will give her mechanical and antibiotic prep for preparation for surgery today     - I have discussed the risk and potential complication associated with colon resection. I have discussed with her if colon is not primarily and significantly inflamed, I will hold off on resection     - NPO    - We will adhere partially to ERAS: preop DVT prophylaxis, preop pain meds, and minimize intra-op IV fluid.       08:18 EST; 12/20/2023        Harvinder Vasquez MD  Colon and Rectal Surgery   [facility]

## 2023-12-20 NOTE — CASE MANAGEMENT/SOCIAL WORK
Continued Stay Note  [facility]ter     Patient Name: Charity Mcclure  MRN: [id number]  Today's Date: 12/20/2023    Admit Date: 12/16/2023    Plan: Home. Family can transport at discharge.   Discharge Plan       Row Name 12/20/23 1636       Plan    Plan Home. Family can transport at discharge.    Plan Comments DC barriers: surgery 12/21/23  total hysterctomy, lap salpingoophorectomy, node disection, removal of pelvie abscess  monitoring hgb                  Sindy Hebert RN

## 2023-12-20 NOTE — PAYOR COMM NOTE
"Charity Menchaca (63 y.o. Female)       Date of Birth   1960    Social Security Number       Address   [address] [place] [facility]    Home Phone   [phone]    MRN   [id number]       Islam   None    Marital Status                               Admission Date   23    Admission Type   Urgent    Admitting Provider   Kleber Steele MD    Attending Provider   Dg Payne MD    Department, Room/Bed   [facility] SURGICAL INPATIENT, 4105/       Discharge Date       Discharge Disposition       Discharge Destination                                 Attending Provider: Dg Payne MD    Allergies: No Known Allergies    Isolation: None   Infection: None   Code Status: CPR    Ht: 154.9 cm (61\")   Wt: 70.2 kg (154 lb 12.2 oz)    Admission Cmt: None   Principal Problem: Perforated diverticulum [K57.80]                   Active Insurance as of 2023       Primary Coverage       Payor Plan Insurance Group Employer/Plan Group    [employer] BLUE [employer]OSS [employer] BLUE [employer]OSS BLUE [employer]IELD PPO [id number]       Payor Plan Address Payor Plan Phone Number Payor Plan Fax Number Effective Dates    [address] [phone]  2022 - None Entered    [facility] [id number]         Subscriber Name Subscriber Birth Date Member ID       CHARITY MENCHACA 1960 [id number]                     Emergency Contacts        (Rel.) Home Phone Work Phone Mobile Phone    SHAHLA MENCHACA (Spouse) [phone] -- [phone]              Operative/Procedure Notes (last 48 hours)  Notes from 23 1220 through 23 1220   No notes of this type exist for this encounter.          Physician Progress Notes (last 48 hours)        Harvinder Vasquez MD at 23 0818          Colorectal Surgery Progress Note    Pt. Name/Age/:  Charity Menchaca   63 y.o.    1960         Med. Record Number:   [id number]  Date of admission:  2023      Service(s): Colorectal " Surgery      Subjective    Doing well  Hgb responded appropriately after transfusion   CT scan with similar abscess/mass, likely tuboovarian, possible diverticulitis   Had a fever yesterday to max of 100.7  Plan for OR today     Objective  Vitals:     Patient Vitals for the past 24 hrs:   BP Temp Temp src Pulse Resp SpO2   12/20/23 0533 111/70 97.5 °F (36.4 °C) Oral -- 12 --   12/19/23 2017 99/61 98.7 °F (37.1 °C) Oral 71 17 96 %   12/19/23 1800 99/67 98.4 °F (36.9 °C) Oral 75 18 95 %   12/19/23 1732 -- 99.8 °F (37.7 °C) Oral -- -- --   12/19/23 1628 -- 100.2 °F (37.9 °C) Oral 78 -- --   12/19/23 1621 -- (!) 100.6 °F (38.1 °C) Oral 75 -- --   12/19/23 1505 121/76 (!) 100.7 °F (38.2 °C) Oral 82 13 97 %   12/19/23 1436 125/70 99.5 °F (37.5 °C) Oral 82 26 97 %   12/19/23 1145 99/64 98.4 °F (36.9 °C) Oral 76 17 94 %           Wt. Admission: Weight: 70.2 kg (154 lb 12.2 oz)     Wt. Current: Weight: 70.2 kg (154 lb 12.2 oz)   Body mass index is 29.24 kg/m².      I&O:  Intake/Output Summary (Last 24 hours) at 12/20/2023 0818  Last data filed at 12/20/2023 0533  Gross per 24 hour   Intake 2528.6 ml   Output --   Net 2528.6 ml     12/18 1901 - 12/20 0700  In: 4254.9 [P.O.:2040; I.V.:1864.9]  Out: -       Exam  General:  No acute distress, resting comfortably.  Cardiovascular: regular rate.  Respiratory: no increased work of breathing.  Abdomen:  Soft, mildly tender, no distension   Extremities: warm, well-perfused extremities, no pitting edema.        Scheduled Meds:cefTRIAXone, 2,000 mg, Intravenous, Daily  doxycycline, 100 mg, Intravenous, Q12H  erythromycin base, 500 mg, Oral, Once  erythromycin base, 500 mg, Oral, Once  erythromycin base, 500 mg, Oral, Once  escitalopram, 20 mg, Oral, Daily  levothyroxine, 25 mcg, Oral, Q AM  lisinopril, 10 mg, Oral, Daily  metroNIDAZOLE, 500 mg, Intravenous, Q8H  neomycin, 1,000 mg, Oral, Once  neomycin, 500 mg, Oral, Once  neomycin, 500 mg, Oral, Once  pantoprazole, 40 mg, Oral, Q  AM  polyethylene glycol, 4,000 mL, Oral, Once  senna-docusate sodium, 2 tablet, Oral, BID  sodium chloride, 10 mL, Intravenous, Q12H      Continuous Infusions:   PRN Meds:.  acetaminophen    senna-docusate sodium **AND** polyethylene glycol **AND** bisacodyl **AND** bisacodyl    Calcium Replacement - Follow Nurse / BPA Driven Protocol    HYDROmorphone    ketorolac    Magnesium Standard Dose Replacement - Follow Nurse / BPA Driven Protocol    nitroglycerin    ondansetron **OR** ondansetron    oxyCODONE    Phosphorus Replacement - Follow Nurse / BPA Driven Protocol    Potassium Replacement - Follow Nurse / BPA Driven Protocol    sodium chloride    sodium chloride          Assessment  63 y.o. female with diverticulitis and tubo-ovarian abscess     Plan / Recommendations    - reviewed her CT scan independently. Tuboovarian complex cystic mass/abscess with likely partial torsion. There is thickening of the colon with mesenteric strand. Likely from diverticulitis, possible secondary inflammation    - I will give her mechanical and antibiotic prep for preparation for surgery today     - I have discussed the risk and potential complication associated with colon resection. I have discussed with her if colon is not primarily and significantly inflamed, I will hold off on resection     - NPO    - We will adhere partially to ERAS: preop DVT prophylaxis, preop pain meds, and minimize intra-op IV fluid.       08:18 EST; 2023        Harvinder Vasquez MD  Colon and Rectal Surgery   [facility]        Electronically signed by Harvinder aVsquez MD at 23 0825       Juju Navarrete MD at 23 1600            Subjective .     History of present illness:    62 y/o  with TOA and concern for extension to diverticulum versus perforated diverticulum.   This patient was admitted on Saturday and has been treated with IV antibiotics since admission for suspected TOA.   Patient reports that over the past week she has had worsened  pain on left side, and initially thought it was abdominal pain associated with constipation s/p recent hip surgery. But she reports pain increased with also fever and chills, and she suspected respiratory infection. Since admission, she has had some improvement in pain with IV narcotic therapy and reports pain is worst with PO and mostly located in LLQ. She denies any significant GYN history, she had prior CD, but no other abdominal surgeries. She denies any history of abnormal pap smears, reports last pap within past 5 years. Sexually active with  only. She denies any PMB.     She has no other significant medical history besides HTN that has been well controlled on lisinopril and thyroid disease.     Patient Active Problem List   Diagnosis    Perforated diverticulum    Mood disorder    Hypertension    Disease of thyroid gland    Tubo-ovarian mass       Past Medical History:   Diagnosis Date    Disease of thyroid gland 2023    Hypertension 2023       History reviewed. No pertinent surgical history.    No obstetric history on file.    No Known Allergies      Objective     Vital Signs   Vitals:    23 0818 23 1145 23 1436 23 1505   BP: (!) 88/51 99/64 125/70 121/76   BP Location:  Right arm     Patient Position:  Lying     Pulse: 77 76 82 82   Resp:  17 26 13   Temp:  98.4 °F (36.9 °C) 99.5 °F (37.5 °C) (!) 100.7 °F (38.2 °C)   TempSrc:  Oral Oral Oral   SpO2:  94% 97% 97%   Weight:       Height:         Temp (24hrs), Av.1 °F (37.3 °C), Min:97.7 °F (36.5 °C), Max:100.7 °F (38.2 °C)      Physical Exam:     General Appearance:    Alert, cooperative, in no acute distress   Abdomen:     Normal bowel sounds, no palpable masses, no organomegaly, ttp in LLQ, pos guarding, no rebound                 tenderness   Genitalia:    Deferred at this time  Ext: No LE edema, nttp     Lab Results:  Lab Results (last 48 hours)       Procedure Component Value Units Date/Time    Potassium  [[id number]]  (Normal) Collected: 12/19/23 1422    Specimen: Blood from Arm, Left Updated: 12/19/23 1440     Potassium 3.8 mmol/L     Comprehensive Metabolic Panel [[id number]]  (Abnormal) Collected: 12/19/23 0216    Specimen: Blood Updated: 12/19/23 0307     Glucose 110 mg/dL      BUN 6 mg/dL      Creatinine 0.61 mg/dL      Sodium 136 mmol/L      Potassium 3.6 mmol/L      Chloride 102 mmol/L      CO2 24.0 mmol/L      Calcium 8.5 mg/dL      Total Protein 6.2 g/dL      Albumin 2.7 g/dL      ALT (SGPT) 14 U/L      AST (SGOT) 23 U/L      Alkaline Phosphatase 91 U/L      Total Bilirubin 0.2 mg/dL      Globulin 3.5 gm/dL      A/G Ratio 0.8 g/dL      BUN/Creatinine Ratio 9.8     Anion Gap 10.0 mmol/L      eGFR 100.6 mL/min/1.73     Narrative:      GFR Normal >60  Chronic Kidney Disease <60  Kidney Failure <15      CBC Auto Differential [[id number]]  (Abnormal) Collected: 12/19/23 0216    Specimen: Blood Updated: 12/19/23 0225     WBC 20.60 10*3/mm3      RBC 2.36 10*6/mm3      Hemoglobin 7.3 g/dL      Hematocrit 22.1 %      MCV 93.7 fL      MCH 31.0 pg      MCHC 33.0 g/dL      RDW 15.0 %      RDW-SD 49.4 fl      MPV 7.5 fL      Platelets 355 10*3/mm3      Neutrophil % 83.7 %      Lymphocyte % 8.2 %      Monocyte % 6.7 %      Eosinophil % 0.7 %      Basophil % 0.7 %      Neutrophils, Absolute 17.20 10*3/mm3      Lymphocytes, Absolute 1.70 10*3/mm3      Monocytes, Absolute 1.40 10*3/mm3      Eosinophils, Absolute 0.10 10*3/mm3      Basophils, Absolute 0.10 10*3/mm3      nRBC 0.0 /100 WBC     Comprehensive Metabolic Panel [[id number]]  (Abnormal) Collected: 12/18/23 0401    Specimen: Blood Updated: 12/18/23 0552     Glucose 90 mg/dL      BUN 4 mg/dL      Creatinine 0.62 mg/dL      Sodium 136 mmol/L      Potassium 4.1 mmol/L      Chloride 99 mmol/L      CO2 23.0 mmol/L      Calcium 8.7 mg/dL      Total Protein 6.2 g/dL      Albumin 2.7 g/dL      ALT (SGPT) 12 U/L      AST (SGOT) 17 U/L      Alkaline Phosphatase 96 U/L      Total  Bilirubin 0.4 mg/dL      Globulin 3.5 gm/dL      A/G Ratio 0.8 g/dL      BUN/Creatinine Ratio 6.5     Anion Gap 14.0 mmol/L      eGFR 100.2 mL/min/1.73     Narrative:      GFR Normal >60  Chronic Kidney Disease <60  Kidney Failure <15      Protime-INR [[id number]]  (Abnormal) Collected: 12/18/23 0401    Specimen: Blood Updated: 12/18/23 0441     Protime 12.3 Seconds      INR 1.14    CBC Auto Differential [[id number]]  (Abnormal) Collected: 12/18/23 0401    Specimen: Blood Updated: 12/18/23 0433     WBC 18.60 10*3/mm3      RBC 2.52 10*6/mm3      Hemoglobin 7.6 g/dL      Hematocrit 23.4 %      MCV 92.5 fL      MCH 30.2 pg      MCHC 32.6 g/dL      RDW 15.1 %      RDW-SD 52.5 fl      MPV 7.5 fL      Platelets 328 10*3/mm3      Neutrophil % 85.7 %      Lymphocyte % 7.2 %      Monocyte % 6.7 %      Eosinophil % 0.2 %      Basophil % 0.2 %      Neutrophils, Absolute 16.00 10*3/mm3      Lymphocytes, Absolute 1.30 10*3/mm3      Monocytes, Absolute 1.20 10*3/mm3      Eosinophils, Absolute 0.00 10*3/mm3      Basophils, Absolute 0.00 10*3/mm3      nRBC 0.1 /100 WBC     Potassium [[id number]]  (Normal) Collected: 12/17/23 1540    Specimen: Blood Updated: 12/17/23 1604     Potassium 4.7 mmol/L      Comment: Result checked                 Radiology Results:  Imaging Results (Last 72 Hours)       Procedure Component Value Units Date/Time    CT Abdomen Pelvis With Contrast [[id number]] Collected: 12/19/23 1338     Updated: 12/19/23 1351    Narrative:      CT ABDOMEN PELVIS W CONTRAST    Date of Exam: 12/19/2023 12:36 PM CST    Indication: Diverticulitis, complication suspected.    Comparison: Outside CT 12/15/2023    Technique: Axial CT images were obtained of the abdomen and pelvis following the uneventful intravenous administration of iodinated contrast. Sagittal and coronal reconstructions were performed.  Automated exposure control and iterative reconstruction   methods were used.        Findings:  LUNG BASES: Small bilateral  pleural effusions with basal atelectasis.    LIVER:  Unremarkable parenchyma without focal lesion.  BILIARY/GALLBLADDER:  Unremarkable  SPLEEN: There is a punctate gallstone in the region of the gallbladder neck.  PANCREAS:  Unremarkable  ADRENAL:  Unremarkable  KIDNEYS:  Unremarkable parenchyma with no solid mass identified. No obstruction.  No calculus identified.  GASTROINTESTINAL/MESENTERY: There is mural thickening involving the sigmoid colon. No diverticular identified. Along the posterior margin of the mid to distal sigmoid colon is an irregular thick-walled rim-enhancing 6.5 x 6.2 cm cystic mass/fluid   collection with surrounding inflammatory change. While this is adjacent to the sigmoid colon this does not appear to arise directly from it. The surrounding inflammatory changes suggest this is inflammatory/infectious in etiology with differential   including tubo-ovarian abscess, perforated diverticulitis with resultant abscess, and ovarian neoplasm. Recommend surgical consultation. If no intervention performed then follow-up imaging recommended to ensure resolution. Ultimately, findings are   similar to previous CT. No evidence of intestinal obstruction.  MESENTERIC VESSELS:  Patent.  AORTA/IVC:  Normal caliber.    RETROPERITONEUM/LYMPH NODES: There is shotty retroperitoneal and left pelvic lymphadenopathy. There are no pathologically enlarged lymph nodes identified.    REPRODUCTIVE: The uterus and right ovary are unremarkable. There is a previously mentioned left central mid abdominal irregular rim-enhancing fluid collection/cystic mass with differential including tubo-ovarian abscess and ovarian neoplasm.  BLADDER:  Unremarkable    OSSEUS STRUCTURES: There is total right hip arthroplasty with associated streak and beam hardening artifact. There is an irregular fluid collection on the anterior margin of the process the cysts posterior to the rectus femoris muscle, similar to   previous exam.    No free  fluid currently.        Impression:      Impression:  1.There is a left anterior pelvic 6.5 x 6.2 cm irregular rim-enhancing fluid collection/cystic mass with surrounding inflammatory change. Imaging features suggest infection such as tubo-ovarian abscess or previous bout of perforated diverticulitis with   resultant abscess formation. No internal gas to confirm infection. However, left ovarian neoplasm is a consideration. Surgical consultation recommended. Follow-up imaging post therapy recommended to ensure resolution. Cystic mass is similar in size   compared to previous examination with resolution of previous free fluid.  2.There is mural thickening involving the mid to distal sigmoid colon related to the previously mentioned pelvic fluid collection/cystic mass.            Electronically Signed: Alber Mercedes MD    2023 12:49 PM CST    Workstation ID: [id number]            Assessment & Plan       Perforated diverticulum    Mood disorder    Hypertension    Disease of thyroid gland    Tubo-ovarian mass      64 y/o  presents with concern for TOA vs perforated diverticulum, currently on IV antibiotic therapy.     Tubo-ovarian abscess  - Patient has no significant RF for TOA  - Patient has currently been on triple IV abx therapy for tx for assumed TOA  - CT A/P repeated today and left sided 6.5x6.2 ring enhancing lesion connecting left ovary and fallopian tube  - IV abx is first line of therapy for TOA, and with the size, IV abx should allow for penetration, but with persistent leukocytosis and intermittent fever, surgical management for assessment can be considered. Radiation consulted and report cannot drain abscess.   - Counseled patient on total robotic or laparoscopic hysterectomy, bilateral salpingo-oophorectomy, and lysis of adhesions. Discussed risk of bleeding, infection, damage to surrounding structures such as bowel, bladder, ureters, and risk of anesthesia: VTE and death.   - Counseled patient  that if surgical management , will need to plan at a time when CRS present due to association with perforated diverticulum.   -Will discuss surgical planning with GYN partners: Dr. Robert and Dr. Staples for scheduling.   - Prior to surgery: will need to be NPO, will hold off on bowel prep at this time pending CRS recommendations for surgical planning, no NKDA for PPX abx but will continue scheduled abx at this time, SCDs/compression stockings for VTE PPX.     I discussed the patients findings and my recommendations with patient.     Juju Navarrete MD  23  16:00 EST    Electronically signed by Juju Navarrete MD at 23 1917       Harvinder Vasquez MD at 23 1032          Colorectal Surgery Progress Note    Pt. Name/Age/:  Charity Mcclure   63 y.o.    1960         Med. Record Number:   [id number]  Date of admission:  2023      Service(s): Colorectal Surgery      Subjective    Reports clinically doing better, minimal pain. However, Her systolic BP is in mid 80's this am, without tachycardia. Afebrile. Her leukocytosis worsen today to 20.60 from 18.6. Her Hgb is 7.3.     Objective  Vitals:     Patient Vitals for the past 24 hrs:   BP Temp Temp src Pulse Resp SpO2   23 0818 (!) 88/51 -- -- 77 -- --   23 0602 94/58 97.7 °F (36.5 °C) Oral -- 16 --   23 2149 94/56 99 °F (37.2 °C) Oral -- 19 --   23 1700 -- -- -- 84 -- --   23 1639 109/61 -- -- 92 16 96 %   23 1208 -- 98.3 °F (36.8 °C) Oral -- 14 --           Wt. Admission: Weight: 70.2 kg (154 lb 12.2 oz)     Wt. Current: Weight: 70.2 kg (154 lb 12.2 oz)   Body mass index is 29.24 kg/m².      I&O:  Intake/Output Summary (Last 24 hours) at 2023 1032  Last data filed at 2023 0451  Gross per 24 hour   Intake 2446.3 ml   Output --   Net 2446.3 ml      1901 -  0700  In: 2446.3 [P.O.:1560; I.V.:886.3]  Out: -       Exam  General:  No acute distress, resting comfortably.  Cardiovascular: regular  rate.  Respiratory: no increased work of breathing.  Abdomen:  Soft, minimal tenderness in left lower quadrant improved from previous exams, no rebound or guarding     Extremities: warm, well-perfused extremities, no pitting edema.      Labs:     [unfilled]          Scheduled Meds:escitalopram, 20 mg, Oral, Daily  levothyroxine, 25 mcg, Oral, Q AM  lisinopril, 10 mg, Oral, Daily  pantoprazole, 40 mg, Oral, Q AM  piperacillin-tazobactam, 3.375 g, Intravenous, Q8H  senna-docusate sodium, 2 tablet, Oral, BID  sodium chloride, 10 mL, Intravenous, Q12H      Continuous Infusions:   PRN Meds:.  acetaminophen    senna-docusate sodium **AND** polyethylene glycol **AND** bisacodyl **AND** bisacodyl    Calcium Replacement - Follow Nurse / BPA Driven Protocol    HYDROmorphone    ketorolac    Magnesium Standard Dose Replacement - Follow Nurse / BPA Driven Protocol    nitroglycerin    ondansetron **OR** ondansetron    oxyCODONE    Phosphorus Replacement - Follow Nurse / BPA Driven Protocol    Potassium Replacement - Follow Nurse / BPA Driven Protocol    sodium chloride    sodium chloride          Assessment  63 y.o. female with diverticulitis and a complex abscess likely tubo-ovarian       Plan / Recommendations    - given her hypotensive episodes and anemia, I have given her a unit of PRBC. Anemia likely from recent hip surgery and ongoing GI loss.   - I will follow her repeat CT A/P   - I am available if surgical intervention is plan by the OBGYN team   - continue with Antibiotics.         10:32 EST; 12/19/2023        Harvinder Vasquez MD  Colon and Rectal Surgery   [facility]        Electronically signed by Harvinder Vasquez MD at 12/19/23 1051       Dana Josue MD at 12/19/23 0908              [facility] MEDICINE SERVICE  DAILY PROGRESS NOTE      Patient Name: Charity Mcclure  Date of Admission: 12/16/2023  Today's Date: 12/19/23  Length of Stay: 3  Primary Care Physician: Meron Whittaker MD    Subjective   Patient  "is doing well at this time.  She had some diaphoresis overnight, but states she feels much better today.  She continues to have mild LLQ pain. No other complaints or events.      Objective    Temp:  [97.7 °F (36.5 °C)-99 °F (37.2 °C)] 97.7 °F (36.5 °C)  Heart Rate:  [77-92] 77  Resp:  [14-19] 16  BP: ()/(51-61) 88/51  Physical Exam  General: NAD, AAOx3  HEENT: AT NC, EOMI  Neck: No JVD  CVS: S1/S2 present, RRR, no M/R/G  Lungs: CTA B/L  Abdomen: soft, minimally tender at LLQ, ND, BS+  Ext: no edema  Skin: no rashes, bruises or discolorations  Neuro: no focal deficits  Psych: Not agitated       Results Review:  I have reviewed the labs, radiology results, and diagnostic studies.    Laboratory Data:   Results from last 7 days   Lab Units 12/19/23  0216 12/18/23  0401 12/17/23  0129   WBC 10*3/mm3 20.60* 18.60* 19.20*   HEMOGLOBIN g/dL 7.3* 7.6* 7.4*   HEMATOCRIT % 22.1* 23.4* 22.7*   PLATELETS 10*3/mm3 355 328 341        Results from last 7 days   Lab Units 12/19/23  0216 12/18/23  0401 12/17/23  1540 12/17/23  0129   SODIUM mmol/L 136 136  --  139   POTASSIUM mmol/L 3.6 4.1 4.7 3.6   CHLORIDE mmol/L 102 99  --  101   CO2 mmol/L 24.0 23.0  --  25.0   BUN mg/dL 6* 4*  --  6*   CREATININE mg/dL 0.61 0.62  --  0.60   CALCIUM mg/dL 8.5* 8.7  --  8.7   BILIRUBIN mg/dL 0.2 0.4  --  0.4   ALK PHOS U/L 91 96  --  137*   ALT (SGPT) U/L 14 12  --  11   AST (SGOT) U/L 23 17  --  18   GLUCOSE mg/dL 110* 90  --  95       Culture Data:   No results found for: \"BLOODCX\"  No results found for: \"URINECX\"    No results found for: \"RESPCX\"  No results found for: \"WOUNDCX\"  No results found for: \"STOOLCX\"  No components found for: \"BODYFLD\"    Radiology Data:   Imaging Results (Last 24 Hours)       ** No results found for the last 24 hours. **            I have reviewed the patient's current medications.     Assessment/Plan     1) diverticulitis/tubo-ovarian abscess  - seen on imaging  - colorectal surgery on board, appreciate " recs  - OB/GYN consulted, appreciate recs  - recommend conservative management for now  - zosyn  - repeat imaging pending per OB    2) HTN  - home meds    3) hypothyroidism  - synthroid    4) mood disorders  - home meds    5) GI/DVT ppx  - protonix/SCDs    6) disposition  - pending specialist clearance        Electronically signed by Dana Josue MD, 12/19/23, 09:09 EST.      Electronically signed by Dana Josue MD at 12/19/23 0910          Consult Notes (last 48 hours)        Kem Staples MD at 12/19/23 1044          Pt not responding with increasing WBC, temp  Spoke with IR and they do not recommend percutaneous drainage based upon location noted on outside films    Rec  Rescan            CT abd/pelvis with oral/IV contrast    Will adjust care accordingly      Electronically signed by Kem Staples MD at 12/19/23 1047       Kem Staples MD at 12/18/23 2175          GYN:    Pt will require repeat imaging to document improvement with conservative management.    Clinically improved but elevated WBC still a concern    Rec CT Abd/Pelvis with contrast  oral and Ivin the next 72 hrs      Electronically signed by Kem Staples MD at 12/18/23 8352

## 2023-12-21 ENCOUNTER — ANESTHESIA (OUTPATIENT)
Dept: PERIOP | Facility: HOSPITAL | Age: 63
End: 2023-12-21
Payer: COMMERCIAL

## 2023-12-21 LAB
BH BB BLOOD EXPIRATION DATE: NORMAL
BH BB BLOOD TYPE BARCODE: 6200
BH BB DISPENSE STATUS: NORMAL
BH BB PRODUCT CODE: NORMAL
BH BB UNIT NUMBER: NORMAL
CANCER AG125 SERPL QL: 41.5 U/ML (ref 0–38.1)
CROSSMATCH INTERPRETATION: NORMAL
UNIT  ABO: NORMAL
UNIT  RH: NORMAL

## 2023-12-21 PROCEDURE — 25010000002 HYDROMORPHONE 1 MG/ML SOLUTION: Performed by: STUDENT IN AN ORGANIZED HEALTH CARE EDUCATION/TRAINING PROGRAM

## 2023-12-21 PROCEDURE — 0UT14ZZ RESECTION OF LEFT OVARY, PERCUTANEOUS ENDOSCOPIC APPROACH: ICD-10-PCS | Performed by: OBSTETRICS & GYNECOLOGY

## 2023-12-21 PROCEDURE — 0D1N4Z4 BYPASS SIGMOID COLON TO CUTANEOUS, PERCUTANEOUS ENDOSCOPIC APPROACH: ICD-10-PCS | Performed by: STUDENT IN AN ORGANIZED HEALTH CARE EDUCATION/TRAINING PROGRAM

## 2023-12-21 PROCEDURE — 25810000003 LACTATED RINGERS PER 1000 ML: Performed by: ANESTHESIOLOGY

## 2023-12-21 PROCEDURE — 0DTN4ZZ RESECTION OF SIGMOID COLON, PERCUTANEOUS ENDOSCOPIC APPROACH: ICD-10-PCS | Performed by: STUDENT IN AN ORGANIZED HEALTH CARE EDUCATION/TRAINING PROGRAM

## 2023-12-21 PROCEDURE — 25010000002 METRONIDAZOLE 500 MG/100ML SOLUTION: Performed by: INTERNAL MEDICINE

## 2023-12-21 PROCEDURE — 25010000002 ONDANSETRON PER 1 MG: Performed by: ANESTHESIOLOGY

## 2023-12-21 PROCEDURE — 25010000002 SUGAMMADEX 200 MG/2ML SOLUTION: Performed by: ANESTHESIOLOGY

## 2023-12-21 PROCEDURE — 88307 TISSUE EXAM BY PATHOLOGIST: CPT | Performed by: STUDENT IN AN ORGANIZED HEALTH CARE EDUCATION/TRAINING PROGRAM

## 2023-12-21 PROCEDURE — 88305 TISSUE EXAM BY PATHOLOGIST: CPT | Performed by: OBSTETRICS & GYNECOLOGY

## 2023-12-21 PROCEDURE — 25010000002 FENTANYL CITRATE (PF) 100 MCG/2ML SOLUTION: Performed by: NURSE ANESTHETIST, CERTIFIED REGISTERED

## 2023-12-21 PROCEDURE — 25010000002 PROPOFOL 200 MG/20ML EMULSION: Performed by: NURSE ANESTHETIST, CERTIFIED REGISTERED

## 2023-12-21 PROCEDURE — 0W9J4ZZ DRAINAGE OF PELVIC CAVITY, PERCUTANEOUS ENDOSCOPIC APPROACH: ICD-10-PCS | Performed by: OBSTETRICS & GYNECOLOGY

## 2023-12-21 PROCEDURE — 8E0W4CZ ROBOTIC ASSISTED PROCEDURE OF TRUNK REGION, PERCUTANEOUS ENDOSCOPIC APPROACH: ICD-10-PCS | Performed by: STUDENT IN AN ORGANIZED HEALTH CARE EDUCATION/TRAINING PROGRAM

## 2023-12-21 PROCEDURE — 25010000002 HYDROMORPHONE 1 MG/ML SOLUTION: Performed by: NURSE ANESTHETIST, CERTIFIED REGISTERED

## 2023-12-21 PROCEDURE — 0UT64ZZ RESECTION OF LEFT FALLOPIAN TUBE, PERCUTANEOUS ENDOSCOPIC APPROACH: ICD-10-PCS | Performed by: OBSTETRICS & GYNECOLOGY

## 2023-12-21 PROCEDURE — 44206 LAP PART COLECTOMY W/STOMA: CPT | Performed by: STUDENT IN AN ORGANIZED HEALTH CARE EDUCATION/TRAINING PROGRAM

## 2023-12-21 PROCEDURE — 25010000002 DEXAMETHASONE PER 1 MG: Performed by: NURSE ANESTHETIST, CERTIFIED REGISTERED

## 2023-12-21 DEVICE — DEV WND/CLS CONTRL TISS STRATAFIX SYMM PDS PLS CTX 60CM VIL: Type: IMPLANTABLE DEVICE | Site: ABDOMEN | Status: FUNCTIONAL

## 2023-12-21 DEVICE — ABSORBABLE WOUND CLOSURE DEVICE
Type: IMPLANTABLE DEVICE | Site: ABDOMEN | Status: FUNCTIONAL
Brand: SYNETURE

## 2023-12-21 DEVICE — STAPLER 60 RELOAD GREEN
Type: IMPLANTABLE DEVICE | Site: ABDOMEN | Status: FUNCTIONAL
Brand: SUREFORM

## 2023-12-21 DEVICE — LARGE LIGATION CLIPS 6 CLIPS/CART
Type: IMPLANTABLE DEVICE | Site: ABDOMEN | Status: FUNCTIONAL
Brand: VAS-Q-CLIP

## 2023-12-21 DEVICE — CELLULAR STAPLER WITH TRI-STAPLE TECHNOLOGY
Type: IMPLANTABLE DEVICE | Site: ABDOMEN | Status: FUNCTIONAL
Brand: EEA

## 2023-12-21 RX ORDER — DEXAMETHASONE SODIUM PHOSPHATE 4 MG/ML
INJECTION, SOLUTION INTRA-ARTICULAR; INTRALESIONAL; INTRAMUSCULAR; INTRAVENOUS; SOFT TISSUE AS NEEDED
Status: DISCONTINUED | OUTPATIENT
Start: 2023-12-21 | End: 2023-12-21 | Stop reason: SURG

## 2023-12-21 RX ORDER — SODIUM CHLORIDE 0.9 % (FLUSH) 0.9 %
10 SYRINGE (ML) INJECTION AS NEEDED
Status: DISCONTINUED | OUTPATIENT
Start: 2023-12-21 | End: 2023-12-22 | Stop reason: HOSPADM

## 2023-12-21 RX ORDER — SODIUM CHLORIDE, SODIUM LACTATE, POTASSIUM CHLORIDE, CALCIUM CHLORIDE 600; 310; 30; 20 MG/100ML; MG/100ML; MG/100ML; MG/100ML
9 INJECTION, SOLUTION INTRAVENOUS CONTINUOUS PRN
Status: DISCONTINUED | OUTPATIENT
Start: 2023-12-21 | End: 2023-12-22 | Stop reason: HOSPADM

## 2023-12-21 RX ORDER — SODIUM CHLORIDE 0.9 % (FLUSH) 0.9 %
10 SYRINGE (ML) INJECTION EVERY 12 HOURS SCHEDULED
Status: DISCONTINUED | OUTPATIENT
Start: 2023-12-21 | End: 2023-12-22 | Stop reason: HOSPADM

## 2023-12-21 RX ORDER — ROCURONIUM BROMIDE 10 MG/ML
INJECTION, SOLUTION INTRAVENOUS AS NEEDED
Status: DISCONTINUED | OUTPATIENT
Start: 2023-12-21 | End: 2023-12-21 | Stop reason: SURG

## 2023-12-21 RX ORDER — PHENYLEPHRINE HCL IN 0.9% NACL 1 MG/10 ML
SYRINGE (ML) INTRAVENOUS AS NEEDED
Status: DISCONTINUED | OUTPATIENT
Start: 2023-12-21 | End: 2023-12-21 | Stop reason: SURG

## 2023-12-21 RX ORDER — FENTANYL CITRATE 50 UG/ML
INJECTION, SOLUTION INTRAMUSCULAR; INTRAVENOUS AS NEEDED
Status: DISCONTINUED | OUTPATIENT
Start: 2023-12-21 | End: 2023-12-21 | Stop reason: SURG

## 2023-12-21 RX ORDER — PROPOFOL 10 MG/ML
INJECTION, EMULSION INTRAVENOUS AS NEEDED
Status: DISCONTINUED | OUTPATIENT
Start: 2023-12-21 | End: 2023-12-21 | Stop reason: SURG

## 2023-12-21 RX ORDER — OXYCODONE HYDROCHLORIDE 5 MG/1
5 TABLET ORAL EVERY 4 HOURS PRN
Status: DISCONTINUED | OUTPATIENT
Start: 2023-12-21 | End: 2023-12-22

## 2023-12-21 RX ORDER — NALOXONE HCL 0.4 MG/ML
0.1 VIAL (ML) INJECTION
Status: DISCONTINUED | OUTPATIENT
Start: 2023-12-21 | End: 2023-12-26 | Stop reason: HOSPADM

## 2023-12-21 RX ORDER — ONDANSETRON 2 MG/ML
INJECTION INTRAMUSCULAR; INTRAVENOUS AS NEEDED
Status: DISCONTINUED | OUTPATIENT
Start: 2023-12-21 | End: 2023-12-21 | Stop reason: SURG

## 2023-12-21 RX ORDER — SODIUM CHLORIDE 9 MG/ML
30 INJECTION, SOLUTION INTRAVENOUS CONTINUOUS PRN
Status: DISCONTINUED | OUTPATIENT
Start: 2023-12-21 | End: 2023-12-26 | Stop reason: HOSPADM

## 2023-12-21 RX ADMIN — ROCURONIUM BROMIDE 10 MG: 10 INJECTION, SOLUTION INTRAVENOUS at 21:19

## 2023-12-21 RX ADMIN — ROCURONIUM BROMIDE 10 MG: 10 INJECTION, SOLUTION INTRAVENOUS at 19:24

## 2023-12-21 RX ADMIN — ROCURONIUM BROMIDE 10 MG: 10 INJECTION, SOLUTION INTRAVENOUS at 21:56

## 2023-12-21 RX ADMIN — SODIUM CHLORIDE, SODIUM LACTATE, POTASSIUM CHLORIDE, AND CALCIUM CHLORIDE: .6; .31; .03; .02 INJECTION, SOLUTION INTRAVENOUS at 18:11

## 2023-12-21 RX ADMIN — FENTANYL CITRATE 100 MCG: 50 INJECTION, SOLUTION INTRAMUSCULAR; INTRAVENOUS at 18:22

## 2023-12-21 RX ADMIN — HYDROMORPHONE HYDROCHLORIDE 1 MG: 1 INJECTION, SOLUTION INTRAMUSCULAR; INTRAVENOUS; SUBCUTANEOUS at 19:12

## 2023-12-21 RX ADMIN — Medication 100 MCG: at 18:40

## 2023-12-21 RX ADMIN — PROPOFOL 50 MG: 10 INJECTION, EMULSION INTRAVENOUS at 19:04

## 2023-12-21 RX ADMIN — LIDOCAINE HYDROCHLORIDE 100 MG: 20 INJECTION, SOLUTION EPIDURAL; INFILTRATION; INTRACAUDAL; PERINEURAL at 18:22

## 2023-12-21 RX ADMIN — PROPOFOL 150 MG: 10 INJECTION, EMULSION INTRAVENOUS at 18:22

## 2023-12-21 RX ADMIN — ROCURONIUM BROMIDE 10 MG: 10 INJECTION, SOLUTION INTRAVENOUS at 19:57

## 2023-12-21 RX ADMIN — HYDROMORPHONE HYDROCHLORIDE 1 MG: 1 INJECTION, SOLUTION INTRAMUSCULAR; INTRAVENOUS; SUBCUTANEOUS at 13:32

## 2023-12-21 RX ADMIN — Medication 10 ML: at 09:12

## 2023-12-21 RX ADMIN — FENTANYL CITRATE 50 MCG: 50 INJECTION, SOLUTION INTRAMUSCULAR; INTRAVENOUS at 21:13

## 2023-12-21 RX ADMIN — SUGAMMADEX 400 MG: 100 INJECTION, SOLUTION INTRAVENOUS at 23:51

## 2023-12-21 RX ADMIN — ROCURONIUM BROMIDE 10 MG: 10 INJECTION, SOLUTION INTRAVENOUS at 20:35

## 2023-12-21 RX ADMIN — DOXYCYCLINE 100 MG: 100 INJECTION, POWDER, LYOPHILIZED, FOR SOLUTION INTRAVENOUS at 09:12

## 2023-12-21 RX ADMIN — DEXAMETHASONE SODIUM PHOSPHATE 4 MG: 4 INJECTION, SOLUTION INTRAMUSCULAR; INTRAVENOUS at 18:36

## 2023-12-21 RX ADMIN — ONDANSETRON 4 MG: 2 INJECTION INTRAMUSCULAR; INTRAVENOUS at 23:37

## 2023-12-21 RX ADMIN — SODIUM CHLORIDE, SODIUM LACTATE, POTASSIUM CHLORIDE, AND CALCIUM CHLORIDE: .6; .31; .03; .02 INJECTION, SOLUTION INTRAVENOUS at 21:03

## 2023-12-21 RX ADMIN — METRONIDAZOLE 500 MG: 500 INJECTION, SOLUTION INTRAVENOUS at 13:32

## 2023-12-21 RX ADMIN — METRONIDAZOLE 500 MG: 500 INJECTION, SOLUTION INTRAVENOUS at 01:52

## 2023-12-21 RX ADMIN — FENTANYL CITRATE 50 MCG: 50 INJECTION, SOLUTION INTRAMUSCULAR; INTRAVENOUS at 22:36

## 2023-12-21 RX ADMIN — FENTANYL CITRATE 50 MCG: 50 INJECTION, SOLUTION INTRAMUSCULAR; INTRAVENOUS at 20:50

## 2023-12-21 RX ADMIN — FENTANYL CITRATE 50 MCG: 50 INJECTION, SOLUTION INTRAMUSCULAR; INTRAVENOUS at 22:05

## 2023-12-21 RX ADMIN — ROCURONIUM BROMIDE 10 MG: 10 INJECTION, SOLUTION INTRAVENOUS at 19:01

## 2023-12-21 RX ADMIN — ROCURONIUM BROMIDE 50 MG: 10 INJECTION, SOLUTION INTRAVENOUS at 18:22

## 2023-12-21 RX ADMIN — HYDROMORPHONE HYDROCHLORIDE 1 MG: 1 INJECTION, SOLUTION INTRAMUSCULAR; INTRAVENOUS; SUBCUTANEOUS at 07:13

## 2023-12-21 NOTE — PROGRESS NOTES
[facility] Medicine Services Daily Progress Note    Patient Name: Charity Mcclure  : 1960  MRN: [id number]  Primary Care Physician:  Meron Whittaker MD  Date of admission: 2023      Subjective      Chief Complaint: Abdominal Pain     Brief interim history: 63-year-old hypertension, hypothyroidism, hypertension, mood disorder, and right hip repair.  Patient was admitted on 2023  presented to [facility] ED, complaining of abdominal pain with subjective fever chills and malaise.  Patient initially thought she might have had a flu.  However, she continues to feel bad and developed nonbloody diarrhea, and poor oral intake.  Was initially seen at [facility] ED and CT of the abdomen/pelvis at that facility revealed perforated diverticulum with tubo-ovarian mass, concerning for possible abscess.  Laboratories notable for leukocytosis with WBC of 21 K, CT of the abdomen/pelvis with contrast (), showed a left anterior pelvis 6.5 x 6.2 cm irregular rim enhancing fluid collection/cystic mass with surrounding inflammatory changes, and imaging features suggest infection such as tubal ovarian abscess or previous bout of perforated diverticulitis with resultant abscess formation.   She is admitted with principal diagnosis of suspected diverticulitis/tubo-ovarian abscess.  She was seen in consultation by OB/GYN and general surgery with recommendations, and plan for surgical intervention tentatively scheduled for tomorrow.    2023: Seen and examined in follow-up.  Complain of mild lower abdominal quadrant discomfort, otherwise, no events reported overnight.    2023: Seen and examined in follow-up.  No complaints or event overnight and plan for surgical intervention scheduled for today    Vitals:   Temp:  [98.7 °F (37.1 °C)-98.9 °F (37.2 °C)] 98.9 °F (37.2 °C)  Heart Rate:  [66-75] 75  Resp:  [13-18] 18  BP: ()/(66-74) 94/66    Physical Exam  Constitutional:        General: She is not in acute distress.     Appearance: She is obese. She is not ill-appearing.   HENT:      Head: Normocephalic.      Right Ear: Tympanic membrane normal.      Nose: Nose normal.   Eyes:      Pupils: Pupils are equal, round, and reactive to light.   Cardiovascular:      Rate and Rhythm: Normal rate.      Pulses: Normal pulses.   Pulmonary:      Effort: Pulmonary effort is normal.   Abdominal:      Palpations: Abdomen is soft.   Musculoskeletal:         General: Normal range of motion.      Cervical back: Neck supple.   Skin:     General: Skin is warm.   Neurological:      General: No focal deficit present.      Mental Status: She is alert.             Result Review    Result Review:  I have personally reviewed the results from the time of this admission to 12/21/2023 13:38 EST and agree with these findings:  []  Laboratory  []  Microbiology  []  Radiology  []  EKG/Telemetry   []  Cardiology/Vascular   []  Pathology  []  Old records  []  Other:  Most notable findings include:     Wounds (last 24 hours)       LDA Wound       Row Name 12/21/23 0713 12/21/23 0400 12/21/23 0000       Wound 12/16/23 Right anterior greater trochanter Incision    Wound - Properties Group Placement Date: 12/16/23  -AG Present on Original Admission: N  -AG Side: Right  -AG Orientation: anterior  -AG Location: greater trochanter  -AG Primary Wound Type: Incision  -AG Wound Outcome: Other  -AG, healing     Dressing Appearance dry;intact;open to air  scabbed  -DV dry;intact;open to air  -SH dry;intact;open to air  -SH    Drainage Amount none  -DV -- --    Dressing Care open to air  -DV -- --    Retired Wound - Properties Group Placement Date: 12/16/23  -AG Present on Original Admission: N  -AG Side: Right  -AG Orientation: anterior  -AG Location: greater trochanter  -AG Primary Wound Type: Incision  -AG Wound Outcome: Other  -AG, healing     Retired Wound - Properties Group Date first assessed: 12/16/23  -AG Present on Original  Admission: N  -AG Side: Right  -AG Location: greater trochanter  -AG Primary Wound Type: Incision  -AG Wound Outcome: Other  -AG, healing       Row Name 12/20/23 2007             Wound 12/16/23 Right anterior greater trochanter Incision    Wound - Properties Group Placement Date: 12/16/23  -AG Present on Original Admission: N  -AG Side: Right  -AG Orientation: anterior  -AG Location: greater trochanter  -AG Primary Wound Type: Incision  -AG Wound Outcome: Other  -AG, healing     Dressing Appearance dry;intact;open to air  -SH      Dressing Care open to air  -SH      Retired Wound - Properties Group Placement Date: 12/16/23  -AG Present on Original Admission: N  -AG Side: Right  -AG Orientation: anterior  -AG Location: greater trochanter  -AG Primary Wound Type: Incision  -AG Wound Outcome: Other  -AG, healing     Retired Wound - Properties Group Date first assessed: 12/16/23  -AG Present on Original Admission: N  -AG Side: Right  -AG Location: greater trochanter  -AG Primary Wound Type: Incision  -AG Wound Outcome: Other  -AG, healing               User Key  (r) = Recorded By, (t) = Taken By, (c) = Cosigned By      Initials Name Provider Type    Amelia Blackwood LPN Licensed Nurse     Belinda Jacinto, RN Registered Nurse     Marivel Marítnez LPN Licensed Nurse                      Assessment & Plan        cefTRIAXone, 2,000 mg, Intravenous, Daily  doxycycline, 100 mg, Intravenous, Q12H  escitalopram, 20 mg, Oral, Daily  levothyroxine, 25 mcg, Oral, Q AM  lisinopril, 10 mg, Oral, Daily  metroNIDAZOLE, 500 mg, Intravenous, Q8H  pantoprazole, 40 mg, Oral, Q AM  senna-docusate sodium, 2 tablet, Oral, BID  sodium chloride, 10 mL, Intravenous, Q12H             Active Hospital Problems:  Active Hospital Problems    Diagnosis     **Perforated diverticulum     Mood disorder     Hypertension     Disease of thyroid gland     Tubo-ovarian mass        Assessment/plan:    Diverticulitis/tubo-ovarian abscess      -supportive  care, pain control, Rocephin/doxycycline/Flagyl       -urine culture showed no growth        -planned  OR today for surgical intervention    Hypertension     -Lisinopril    Hypothyroidism     -Synthroid    Depression      -Lexapro    DVT prophylaxis:  Mechanical DVT prophylaxis orders are present.    CODE STATUS:    Level Of Support Discussed With: Patient  Code Status (Patient has no pulse and is not breathing): CPR (Attempt to Resuscitate)  Medical Interventions (Patient has pulse or is breathing): Full Support      Disposition:  I expect patient to be discharged 4 to 5 days.    This patient has been  and discussed with . 12/21/23      Electronically signed by Dg Payne MD, 12/21/23, 13:38 EST.  [facility] Hospitalist Team

## 2023-12-21 NOTE — CASE MANAGEMENT/SOCIAL WORK
Continued Stay Note  [facility]     Patient Name: Charity Mcclure  MRN: [id number]  Today's Date: 12/21/2023    Admit Date: 12/16/2023    Plan: D/C Plan: Home. Family can transport at discharge.   Discharge Plan       Row Name 12/21/23 1131       Plan    Plan D/C Plan: Home. Family can transport at discharge.    Provided Post Acute Provider List? N/A    Provided Post Acute Provider Quality & Resource List? N/A    Plan Comments D/c Barriers: IV antibiotics, 12/21 Total Hysterectomy               Expected Discharge Date and Time       Expected Discharge Date Expected Discharge Time    Dec 23, 2023               Gina Sutherland RN     [address] [address]  Phone: [phone]  Fax: [phone]

## 2023-12-21 NOTE — PROGRESS NOTES
Plan for surgical management today d/t assumed tuboovarian abscess.   Patient has been on triple IV abx since admission.   Still having mild pain and leokocytosis. Has been afebrile for last 24 hours.   Due to pain and leukocytosis, patient has been counseled on USO, removal/drainage of TOA. Also counseled on possible hysterectomy with bilateral salpingo-oophorectomy due to extent of TOA and pelvic adhesions. Patient has been counseled on r/b/a to surgical management, and reiterated again, prior to surgery.   Surgery to be performed and will consult CRS if needed due to earlier images that were concerning for perforated diverticulum.    Hb 9.3 on 12/20, WBCs decreasing now 16.9 but still elevated, normal plts of 355k.   Has an elevated , likely d/t infectious TOA.

## 2023-12-21 NOTE — PLAN OF CARE
Goal Outcome Evaluation:  Plan of Care Reviewed With: patient, spouse        Progress: no change  Outcome Evaluation: pt. undergoing surgery this shift. plan of care on-going

## 2023-12-21 NOTE — ANESTHESIA PREPROCEDURE EVALUATION
Anesthesia Evaluation     Patient summary reviewed and Nursing notes reviewed   no history of anesthetic complications:   NPO Solid Status: > 8 hours  NPO Liquid Status: > 2 hours           Airway   Dental      Pulmonary    (+) a smoker Former,  Cardiovascular     ECG reviewed    (+) hypertension      Neuro/Psych  (+) psychiatric history  GI/Hepatic/Renal/Endo    (+) thyroid problem hypothyroidism    Musculoskeletal     Abdominal    Substance History      OB/GYN          Other   blood dyscrasia anemia,     ROS/Med Hx Other: Additional History:  Low albumin, pelvic abscess, perforated diverticulum, tubo-ovarian mass    PSH:                Anesthesia Plan    ASA 3     general     (Patient identified; pre-operative vital signs, all relevant labs/studies, complete medical/surgical/anesthetic history, full medication list, full allergy list, and NPO status obtained/reviewed; physical assessment performed; anesthetic options, side effects, potential complications, risks, and benefits discussed; questions answered; written anesthesia consent obtained; patient cleared for procedure; anesthesia machine and equipment checked and functioning)  intravenous induction     Anesthetic plan, risks, benefits, and alternatives have been provided, discussed and informed consent has been obtained with: patient.    Plan discussed with CRNA and CAA.    CODE STATUS:    Level Of Support Discussed With: Patient  Code Status (Patient has no pulse and is not breathing): CPR (Attempt to Resuscitate)  Medical Interventions (Patient has pulse or is breathing): Full Support

## 2023-12-21 NOTE — PLAN OF CARE
Goal Outcome Evaluation:    Pt up and ambulating to bathroom. NPO since midnight. Dose of 0.5 mg dilaudid ordered for break through pain. VSS, call light within reach, plan of care on going.

## 2023-12-21 NOTE — ANESTHESIA PROCEDURE NOTES
Airway  Urgency: elective    Date/Time: 12/21/2023 6:24 PM  Airway not difficult    General Information and Staff    Patient location during procedure: OR  CRNA/CAA: Sofia Bhardwaj CRNA    Indications and Patient Condition  Indications for airway management: airway protection    Preoxygenated: yes  Mask difficulty assessment: 1 - vent by mask    Final Airway Details  Final airway type: endotracheal airway      Successful airway: ETT  Cuffed: yes   Successful intubation technique: direct laryngoscopy  Facilitating devices/methods: intubating stylet  Endotracheal tube insertion site: oral  Blade: Gallegos  Blade size: 2  ETT size (mm): 7.0  Cormack-Lehane Classification: grade I - full view of glottis  Placement verified by: chest auscultation and capnometry   Cuff volume (mL): 7  Measured from: lips  ETT/EBT  to lips (cm): 21  Number of attempts at approach: 1  Assessment: lips, teeth, and gum same as pre-op and atraumatic intubation

## 2023-12-21 NOTE — PROGRESS NOTES
Colorectal Surgery Progress Note    Pt. Name/Age/:  Charity Mcclure   63 y.o.    1960         Med. Record Number:   [id number]  Date of admission:  2023      Service(s): Colorectal Surgery      Subjective    Doing well.  No acute issues   Vitals without fever   Plan for OR today     Objective  Vitals:     Patient Vitals for the past 24 hrs:   BP Temp Temp src Pulse Resp SpO2   23 0900 94/66 -- -- 75 -- --   23 0500 133/74 98.7 °F (37.1 °C) Axillary 66 13 97 %   23 1247 137/80 -- -- -- -- 97 %   23 1220 128/80 99 °F (37.2 °C) Oral -- 24 --           Wt. Admission: Weight: 70.2 kg (154 lb 12.2 oz)     Wt. Current: Weight: 70.2 kg (154 lb 12.2 oz)   Body mass index is 29.24 kg/m².      I&O:  Intake/Output Summary (Last 24 hours) at 2023 1010  Last data filed at 2023 0717  Gross per 24 hour   Intake 1634 ml   Output --   Net 1634 ml      190 -  0700  In: 1938.6 [P.O.:960; I.V.:978.6]  Out: -       Exam  General:  No acute distress, resting comfortably.  Cardiovascular: regular rate.  Respiratory: no increased work of breathing.  Abdomen:  Soft, minimal tender , no rebound or guarding       Labs:     [unfilled]          Scheduled Meds:cefTRIAXone, 2,000 mg, Intravenous, Daily  doxycycline, 100 mg, Intravenous, Q12H  escitalopram, 20 mg, Oral, Daily  levothyroxine, 25 mcg, Oral, Q AM  lisinopril, 10 mg, Oral, Daily  metroNIDAZOLE, 500 mg, Intravenous, Q8H  pantoprazole, 40 mg, Oral, Q AM  senna-docusate sodium, 2 tablet, Oral, BID  sodium chloride, 10 mL, Intravenous, Q12H      Continuous Infusions:   PRN Meds:.  acetaminophen    senna-docusate sodium **AND** polyethylene glycol **AND** bisacodyl **AND** bisacodyl    Calcium Replacement - Follow Nurse / BPA Driven Protocol    HYDROmorphone    HYDROmorphone    ketorolac    Magnesium Standard Dose Replacement - Follow Nurse / BPA Driven Protocol    nitroglycerin    ondansetron **OR** ondansetron    oxyCODONE     Phosphorus Replacement - Follow Nurse / BPA Driven Protocol    Potassium Replacement - Follow Nurse / BPA Driven Protocol    sodium chloride    sodium chloride          Assessment  63 y.o. female with diverticulitis and tubo-ovarian abscess      Plan / Recommendations     - OR today with gyn.      - I have discussed the risk and potential complication associated with colon resection. I have discussed with her if colon is not primarily and significantly inflamed, I will hold off on resection     - We will adhere partially to ERAS: preop DVT prophylaxis, preop pain meds, and minimize intra-op IV fluid.       10:10 EST; 12/21/2023        Harvinder Vasquez MD  Colon and Rectal Surgery   [facility]

## 2023-12-22 LAB
ALBUMIN SERPL-MCNC: 2.6 G/DL (ref 3.5–5.2)
ALBUMIN/GLOB SERPL: 0.8 G/DL
ALP SERPL-CCNC: 77 U/L (ref 39–117)
ALT SERPL W P-5'-P-CCNC: 10 U/L (ref 1–33)
ANION GAP SERPL CALCULATED.3IONS-SCNC: 16 MMOL/L (ref 5–15)
AST SERPL-CCNC: 14 U/L (ref 1–32)
BASOPHILS # BLD AUTO: 0 10*3/MM3 (ref 0–0.2)
BASOPHILS NFR BLD AUTO: 0.1 % (ref 0–1.5)
BILIRUB SERPL-MCNC: 0.4 MG/DL (ref 0–1.2)
BUN SERPL-MCNC: 7 MG/DL (ref 8–23)
BUN/CREAT SERPL: 12.3 (ref 7–25)
CALCIUM SPEC-SCNC: 8.4 MG/DL (ref 8.6–10.5)
CHLORIDE SERPL-SCNC: 101 MMOL/L (ref 98–107)
CO2 SERPL-SCNC: 20 MMOL/L (ref 22–29)
CREAT SERPL-MCNC: 0.57 MG/DL (ref 0.57–1)
DEPRECATED RDW RBC AUTO: 51.2 FL (ref 37–54)
EGFRCR SERPLBLD CKD-EPI 2021: 102.3 ML/MIN/1.73
EOSINOPHIL # BLD AUTO: 0 10*3/MM3 (ref 0–0.4)
EOSINOPHIL NFR BLD AUTO: 0 % (ref 0.3–6.2)
ERYTHROCYTE [DISTWIDTH] IN BLOOD BY AUTOMATED COUNT: 15.9 % (ref 12.3–15.4)
GLOBULIN UR ELPH-MCNC: 3.3 GM/DL
GLUCOSE SERPL-MCNC: 189 MG/DL (ref 65–99)
HCT VFR BLD AUTO: 28.9 % (ref 34–46.6)
HGB BLD-MCNC: 9.4 G/DL (ref 12–15.9)
LYMPHOCYTES # BLD AUTO: 0.6 10*3/MM3 (ref 0.7–3.1)
LYMPHOCYTES NFR BLD AUTO: 2.2 % (ref 19.6–45.3)
MCH RBC QN AUTO: 29.6 PG (ref 26.6–33)
MCHC RBC AUTO-ENTMCNC: 32.4 G/DL (ref 31.5–35.7)
MCV RBC AUTO: 91.2 FL (ref 79–97)
MONOCYTES # BLD AUTO: 1 10*3/MM3 (ref 0.1–0.9)
MONOCYTES NFR BLD AUTO: 3.8 % (ref 5–12)
NEUTROPHILS NFR BLD AUTO: 24.5 10*3/MM3 (ref 1.7–7)
NEUTROPHILS NFR BLD AUTO: 93.9 % (ref 42.7–76)
NRBC BLD AUTO-RTO: 0 /100 WBC (ref 0–0.2)
PLATELET # BLD AUTO: 452 10*3/MM3 (ref 140–450)
PMV BLD AUTO: 7.2 FL (ref 6–12)
POTASSIUM SERPL-SCNC: 4 MMOL/L (ref 3.5–5.2)
PROCALCITONIN SERPL-MCNC: 1.61 NG/ML (ref 0–0.25)
PROT SERPL-MCNC: 5.9 G/DL (ref 6–8.5)
RBC # BLD AUTO: 3.17 10*6/MM3 (ref 3.77–5.28)
SODIUM SERPL-SCNC: 137 MMOL/L (ref 136–145)
WBC NRBC COR # BLD AUTO: 26.1 10*3/MM3 (ref 3.4–10.8)

## 2023-12-22 PROCEDURE — 25010000002 ONDANSETRON PER 1 MG: Performed by: STUDENT IN AN ORGANIZED HEALTH CARE EDUCATION/TRAINING PROGRAM

## 2023-12-22 PROCEDURE — 80053 COMPREHEN METABOLIC PANEL: CPT | Performed by: STUDENT IN AN ORGANIZED HEALTH CARE EDUCATION/TRAINING PROGRAM

## 2023-12-22 PROCEDURE — 25010000002 HYDROMORPHONE 1 MG/ML SOLUTION: Performed by: NURSE ANESTHETIST, CERTIFIED REGISTERED

## 2023-12-22 PROCEDURE — 25010000002 METRONIDAZOLE 500 MG/100ML SOLUTION: Performed by: INTERNAL MEDICINE

## 2023-12-22 PROCEDURE — 25010000002 HYDROMORPHONE 1 MG/ML SOLUTION: Performed by: NURSE PRACTITIONER

## 2023-12-22 PROCEDURE — 25810000003 LACTATED RINGERS PER 1000 ML: Performed by: STUDENT IN AN ORGANIZED HEALTH CARE EDUCATION/TRAINING PROGRAM

## 2023-12-22 PROCEDURE — 25010000002 HYDROMORPHONE 1 MG/ML SOLUTION: Performed by: STUDENT IN AN ORGANIZED HEALTH CARE EDUCATION/TRAINING PROGRAM

## 2023-12-22 PROCEDURE — 85025 COMPLETE CBC W/AUTO DIFF WBC: CPT | Performed by: INTERNAL MEDICINE

## 2023-12-22 PROCEDURE — 25010000002 CEFTRIAXONE PER 250 MG: Performed by: STUDENT IN AN ORGANIZED HEALTH CARE EDUCATION/TRAINING PROGRAM

## 2023-12-22 PROCEDURE — 84145 PROCALCITONIN (PCT): CPT | Performed by: INTERNAL MEDICINE

## 2023-12-22 PROCEDURE — 99024 POSTOP FOLLOW-UP VISIT: CPT | Performed by: STUDENT IN AN ORGANIZED HEALTH CARE EDUCATION/TRAINING PROGRAM

## 2023-12-22 PROCEDURE — 25010000002 FENTANYL CITRATE (PF) 50 MCG/ML SOLUTION: Performed by: NURSE ANESTHETIST, CERTIFIED REGISTERED

## 2023-12-22 PROCEDURE — 25810000003 SODIUM CHLORIDE 0.9 % SOLUTION: Performed by: STUDENT IN AN ORGANIZED HEALTH CARE EDUCATION/TRAINING PROGRAM

## 2023-12-22 PROCEDURE — 97161 PT EVAL LOW COMPLEX 20 MIN: CPT

## 2023-12-22 PROCEDURE — 25010000002 METRONIDAZOLE 500 MG/100ML SOLUTION: Performed by: STUDENT IN AN ORGANIZED HEALTH CARE EDUCATION/TRAINING PROGRAM

## 2023-12-22 RX ORDER — PROMETHAZINE HYDROCHLORIDE 25 MG/1
25 TABLET ORAL ONCE AS NEEDED
Status: DISCONTINUED | OUTPATIENT
Start: 2023-12-22 | End: 2023-12-22 | Stop reason: HOSPADM

## 2023-12-22 RX ORDER — PROMETHAZINE HYDROCHLORIDE 25 MG/1
25 SUPPOSITORY RECTAL ONCE AS NEEDED
Status: DISCONTINUED | OUTPATIENT
Start: 2023-12-22 | End: 2023-12-22 | Stop reason: HOSPADM

## 2023-12-22 RX ORDER — FENTANYL CITRATE 50 UG/ML
50 INJECTION, SOLUTION INTRAMUSCULAR; INTRAVENOUS
Status: DISCONTINUED | OUTPATIENT
Start: 2023-12-22 | End: 2023-12-22 | Stop reason: HOSPADM

## 2023-12-22 RX ORDER — ACETAMINOPHEN 325 MG/1
650 TABLET ORAL ONCE AS NEEDED
Status: DISCONTINUED | OUTPATIENT
Start: 2023-12-22 | End: 2023-12-22 | Stop reason: HOSPADM

## 2023-12-22 RX ORDER — ONDANSETRON 2 MG/ML
4 INJECTION INTRAMUSCULAR; INTRAVENOUS EVERY 6 HOURS PRN
Status: DISCONTINUED | OUTPATIENT
Start: 2023-12-22 | End: 2023-12-26 | Stop reason: HOSPADM

## 2023-12-22 RX ORDER — HYDRALAZINE HYDROCHLORIDE 20 MG/ML
5 INJECTION INTRAMUSCULAR; INTRAVENOUS
Status: DISCONTINUED | OUTPATIENT
Start: 2023-12-22 | End: 2023-12-22 | Stop reason: HOSPADM

## 2023-12-22 RX ORDER — SODIUM CHLORIDE 9 MG/ML
30 INJECTION, SOLUTION INTRAVENOUS CONTINUOUS PRN
Status: DISCONTINUED | OUTPATIENT
Start: 2023-12-22 | End: 2023-12-23

## 2023-12-22 RX ORDER — OXYCODONE HYDROCHLORIDE 5 MG/1
10 TABLET ORAL ONCE AS NEEDED
Status: DISCONTINUED | OUTPATIENT
Start: 2023-12-22 | End: 2023-12-22 | Stop reason: HOSPADM

## 2023-12-22 RX ORDER — DROPERIDOL 2.5 MG/ML
0.62 INJECTION, SOLUTION INTRAMUSCULAR; INTRAVENOUS ONCE AS NEEDED
Status: DISCONTINUED | OUTPATIENT
Start: 2023-12-22 | End: 2023-12-22 | Stop reason: HOSPADM

## 2023-12-22 RX ORDER — LABETALOL HYDROCHLORIDE 5 MG/ML
5 INJECTION, SOLUTION INTRAVENOUS
Status: DISCONTINUED | OUTPATIENT
Start: 2023-12-22 | End: 2023-12-22 | Stop reason: HOSPADM

## 2023-12-22 RX ORDER — NALOXONE HCL 0.4 MG/ML
0.1 VIAL (ML) INJECTION
Status: DISCONTINUED | OUTPATIENT
Start: 2023-12-22 | End: 2023-12-23

## 2023-12-22 RX ORDER — HYDROMORPHONE HCL IN 0.9% NACL 10 MG/50ML
PATIENT CONTROLLED ANALGESIA SYRINGE INTRAVENOUS CONTINUOUS
Status: DISCONTINUED | OUTPATIENT
Start: 2023-12-22 | End: 2023-12-23

## 2023-12-22 RX ORDER — ACETAMINOPHEN 500 MG
1000 TABLET ORAL EVERY 6 HOURS
Status: DISCONTINUED | OUTPATIENT
Start: 2023-12-22 | End: 2023-12-26 | Stop reason: HOSPADM

## 2023-12-22 RX ORDER — SODIUM CHLORIDE, SODIUM LACTATE, POTASSIUM CHLORIDE, CALCIUM CHLORIDE 600; 310; 30; 20 MG/100ML; MG/100ML; MG/100ML; MG/100ML
100 INJECTION, SOLUTION INTRAVENOUS CONTINUOUS
Status: DISCONTINUED | OUTPATIENT
Start: 2023-12-22 | End: 2023-12-23

## 2023-12-22 RX ORDER — ACETAMINOPHEN 650 MG/1
650 SUPPOSITORY RECTAL EVERY 4 HOURS PRN
Status: DISCONTINUED | OUTPATIENT
Start: 2023-12-22 | End: 2023-12-22 | Stop reason: HOSPADM

## 2023-12-22 RX ADMIN — OXYCODONE HYDROCHLORIDE 5 MG: 5 TABLET ORAL at 04:15

## 2023-12-22 RX ADMIN — Medication 10 ML: at 20:44

## 2023-12-22 RX ADMIN — FENTANYL CITRATE 50 MCG: 50 INJECTION, SOLUTION INTRAMUSCULAR; INTRAVENOUS at 00:35

## 2023-12-22 RX ADMIN — SODIUM CHLORIDE 30 ML/HR: 9 INJECTION, SOLUTION INTRAVENOUS at 10:25

## 2023-12-22 RX ADMIN — HYDROMORPHONE HYDROCHLORIDE 1 MG: 1 INJECTION, SOLUTION INTRAMUSCULAR; INTRAVENOUS; SUBCUTANEOUS at 04:50

## 2023-12-22 RX ADMIN — SODIUM CHLORIDE, POTASSIUM CHLORIDE, SODIUM LACTATE AND CALCIUM CHLORIDE 100 ML/HR: 600; 310; 30; 20 INJECTION, SOLUTION INTRAVENOUS at 02:00

## 2023-12-22 RX ADMIN — DOXYCYCLINE 100 MG: 100 INJECTION, POWDER, LYOPHILIZED, FOR SOLUTION INTRAVENOUS at 14:33

## 2023-12-22 RX ADMIN — ACETAMINOPHEN 1000 MG: 500 TABLET ORAL at 20:43

## 2023-12-22 RX ADMIN — SENNOSIDES AND DOCUSATE SODIUM 2 TABLET: 8.6; 5 TABLET ORAL at 20:43

## 2023-12-22 RX ADMIN — METRONIDAZOLE 500 MG: 500 INJECTION, SOLUTION INTRAVENOUS at 11:23

## 2023-12-22 RX ADMIN — Medication 10 ML: at 10:19

## 2023-12-22 RX ADMIN — CEFTRIAXONE 2000 MG: 2 INJECTION, POWDER, FOR SOLUTION INTRAMUSCULAR; INTRAVENOUS at 20:43

## 2023-12-22 RX ADMIN — Medication: at 10:20

## 2023-12-22 RX ADMIN — HYDROMORPHONE HYDROCHLORIDE 0.5 MG: 1 INJECTION, SOLUTION INTRAMUSCULAR; INTRAVENOUS; SUBCUTANEOUS at 00:22

## 2023-12-22 RX ADMIN — ACETAMINOPHEN 1000 MG: 500 TABLET ORAL at 10:17

## 2023-12-22 RX ADMIN — DOXYCYCLINE 100 MG: 100 INJECTION, POWDER, LYOPHILIZED, FOR SOLUTION INTRAVENOUS at 03:22

## 2023-12-22 RX ADMIN — SENNOSIDES AND DOCUSATE SODIUM 2 TABLET: 8.6; 5 TABLET ORAL at 10:17

## 2023-12-22 RX ADMIN — ONDANSETRON 4 MG: 2 INJECTION INTRAMUSCULAR; INTRAVENOUS at 13:20

## 2023-12-22 RX ADMIN — ESCITALOPRAM OXALATE 20 MG: 10 TABLET ORAL at 10:17

## 2023-12-22 RX ADMIN — HYDROMORPHONE HYDROCHLORIDE 1 MG: 1 INJECTION, SOLUTION INTRAMUSCULAR; INTRAVENOUS; SUBCUTANEOUS at 06:53

## 2023-12-22 RX ADMIN — METRONIDAZOLE 500 MG: 500 INJECTION, SOLUTION INTRAVENOUS at 02:00

## 2023-12-22 RX ADMIN — METRONIDAZOLE 500 MG: 500 INJECTION, SOLUTION INTRAVENOUS at 18:33

## 2023-12-22 NOTE — PLAN OF CARE
Goal Outcome Evaluation:  Plan of Care Reviewed With: patient           Outcome Evaluation: Pt is a 63 y.o. female who presented to [facility] on 12/16 with worsening abdominal pain, nausea and vomitting. Pt was found to have diverticulitis with colon perforation and tubovarian mass. She presents for PT eval status post sigmoid colon resection with temporary diverting colostomy and left salpingo oopherectomy. She demos guarded movements with all mobility but no overt balance or safety deficits. She completes all activity with SBA/CGA, including gait x15' without AD. She has a supportive spouse and a safe DC disposition.  Pt does not have ongoing needs for inpatient skilled physical therapy. No f/u therapy or DME recommended. Will complete.      Anticipated Discharge Disposition (PT): home with assist

## 2023-12-22 NOTE — ANESTHESIA POSTPROCEDURE EVALUATION
Patient: Charity Mcclure    Procedure Summary       Date: 12/21/23 Room / Location: [facility] OR 04 / [facility] MAIN OR    Anesthesia Start: 1811 Anesthesia Stop: 2358    Procedure: left SALPINGOOPHORECTOMY,  cysto WITH DAVINCI ROBOT, sigmoid resection, drainage of abcess, creation of colostomy (Bilateral: Abdomen) Diagnosis:     Surgeons: Kem Staples MD Provider: Star Galaviz MD    Anesthesia Type: general ASA Status: 3            Anesthesia Type: general    Vitals  Vitals Value Taken Time   /69 12/22/23 0048   Temp 98.1 °F (36.7 °C) 12/22/23 0048   Pulse 82 12/22/23 0049   Resp 10 12/22/23 0048   SpO2 95 % 12/22/23 0049   Vitals shown include unfiled device data.        Post Anesthesia Care and Evaluation    Patient location during evaluation: PACU  Patient participation: complete - patient participated  Level of consciousness: awake  Pain scale: See nurse's notes for pain score.  Pain management: adequate    Airway patency: patent  Anesthetic complications: No anesthetic complications  PONV Status: none  Cardiovascular status: acceptable  Respiratory status: acceptable and spontaneous ventilation  Hydration status: acceptable    Comments: Patient seen and examined postoperatively; vital signs stable; SpO2 greater than or equal to 90%; cardiopulmonary status stable; nausea/vomiting adequately controlled; pain adequately controlled; no apparent anesthesia complications; patient discharged from anesthesia care when discharge criteria were met

## 2023-12-22 NOTE — CASE MANAGEMENT/SOCIAL WORK
Continued Stay Note  [facility]     Patient Name: Charity Mcclure  MRN: [id number]  Today's Date: 12/22/2023    Admit Date: 12/16/2023    Plan: Referral to [facility] pending acceptance.  Family can transport at discharge.   Discharge Plan       Row Name 12/22/23 1702       Plan    Plan Referral to [facility] pending acceptance.  Family can transport at discharge.    Plan Comments DC barriers: left SALPINGOOPHORECTOMY,  cysto WITH DAVINCI ROBOT, sigmoid resection, drainage of abcess, creation of colostomy, IV antibiotics, PCA pump,                   Sindy Hebert RN

## 2023-12-22 NOTE — PROGRESS NOTES
[facility] Medicine Services Daily Progress Note    Patient Name: Charity Mcclure  : 1960  MRN: [id number]  Primary Care Physician:  Meron Whittaker MD  Date of admission: 2023      Subjective      Chief Complaint: Abdominal Pain     Brief interim history: 63-year-old hypertension, hypothyroidism, hypertension, mood disorder, and right hip repair.  Patient was admitted on 2023  presented to [facility] ED, complaining of abdominal pain with subjective fever chills and malaise.  Patient initially thought she might have had a flu.  However, she continues to feel bad and developed nonbloody diarrhea, and poor oral intake.  Was initially seen at [facility] ED and CT of the abdomen/pelvis at that facility revealed perforated diverticulum with tubo-ovarian mass, concerning for possible abscess.  Laboratories notable for leukocytosis with WBC of 21 K, CT of the abdomen/pelvis with contrast (), showed a left anterior pelvis 6.5 x 6.2 cm irregular rim enhancing fluid collection/cystic mass with surrounding inflammatory changes, and imaging features suggest infection such as tubal ovarian abscess or previous bout of perforated diverticulitis with resultant abscess formation.   She is admitted with principal diagnosis of suspected diverticulitis/tubo-ovarian abscess.  She was seen in consultation by OB/GYN and general surgery with recommendations, and plan for surgical intervention tentatively scheduled for tomorrow.    2023: Seen and examined in follow-up.  Complain of mild lower abdominal quadrant discomfort, otherwise, no events reported overnight.    2023: Seen and examined in follow-up.  No complaints or event overnight and plan for surgical intervention scheduled for today.    2023: Seen and examined in follow-up.  Patient is status post robotic Han colostomy with drainage of abscess and left salpingo-oophorectomy on 2023, and PCA pump  for    12/23/2023: Seen and examined in follow-up.  Complain of moderate pain despite PCA pump.    Vitals:   Temp:  [97.3 °F (36.3 °C)-100.5 °F (38.1 °C)] 97.3 °F (36.3 °C)  Heart Rate:  [77-90] 80  Resp:  [9-19] 19  BP: (104-149)/(52-90) 117/72  Flow (L/min):  [2-4] 2    Physical Exam  Constitutional:       General: She is not in acute distress.     Appearance: She is obese. She is not ill-appearing.   HENT:      Head: Normocephalic.      Right Ear: Tympanic membrane normal.      Nose: Nose normal.   Eyes:      Pupils: Pupils are equal, round, and reactive to light.   Cardiovascular:      Rate and Rhythm: Normal rate.      Pulses: Normal pulses.   Pulmonary:      Effort: Pulmonary effort is normal.   Abdominal:      Palpations: Abdomen is soft.   Musculoskeletal:         General: Normal range of motion.      Cervical back: Neck supple.   Skin:     General: Skin is warm.   Neurological:      General: No focal deficit present.      Mental Status: She is alert.             Result Review    Result Review:  I have personally reviewed the results from the time of this admission to 12/22/2023 13:38 EST and agree with these findings:  []  Laboratory  []  Microbiology  []  Radiology  []  EKG/Telemetry   []  Cardiology/Vascular   []  Pathology  []  Old records  []  Other:  Most notable findings include:     Wounds (last 24 hours)       LDA Wound       Row Name 12/22/23 1048 12/22/23 0400 12/22/23 0216       Wound 12/16/23 Right anterior greater trochanter Incision    Wound - Properties Group Placement Date: 12/16/23  -AG Present on Original Admission: N  -AG Side: Right  -AG Orientation: anterior  -AG Location: greater trochanter  -AG Primary Wound Type: Incision  -AG Wound Outcome: Other  -AG, healing     Dressing Appearance dry;intact;open to air  -DV dry;intact;open to air  -SH dry;intact;open to air  -SH    Drainage Amount none  -DV -- none  -SH    Dressing Care -- -- open to air  -SH    Retired Wound - Properties Group  Placement Date: 12/16/23  -AG Present on Original Admission: N  -AG Side: Right  -AG Orientation: anterior  -AG Location: greater trochanter  -AG Primary Wound Type: Incision  -AG Wound Outcome: Other  -AG, healing     Retired Wound - Properties Group Date first assessed: 12/16/23  -AG Present on Original Admission: N  -AG Side: Right  -AG Location: greater trochanter  -AG Primary Wound Type: Incision  -AG Wound Outcome: Other  -AG, healing        Wound 12/21/23 1844 abdomen Incision    Wound - Properties Group Placement Date: 12/21/23  -MT Placement Time: 1844 -MT Location: abdomen  -MT Primary Wound Type: Incision  -MT, x5     Dressing Appearance dry;intact;no drainage  -DV dry;intact;no drainage  -SH dry;intact;no drainage  -SH    Closure Unable to assess  -DV Unable to assess  -SH Unable to assess  dressing in place  -SH    Base dressing in place, unable to visualize  -DV -- --    Drainage Amount none  -DV -- --    Retired Wound - Properties Group Placement Date: 12/21/23  -MT Placement Time: 1844 -MT Location: abdomen  -MT Primary Wound Type: Incision  -MT, x5     Retired Wound - Properties Group Date first assessed: 12/21/23  -MT Time first assessed: 1844 -MT Location: abdomen  -MT Primary Wound Type: Incision  -MT, x5        Wound 12/21/23 2224 lower abdomen Incision    Wound - Properties Group Placement Date: 12/21/23  -HB Placement Time: 2224 -HB Orientation: lower  -HB Location: abdomen  -HB Primary Wound Type: Incision  -HB    Dressing Appearance dry;intact;no drainage  -DV dry;intact;no drainage  -SH dry;intact;no drainage  -SH    Closure Unable to assess  -DV Unable to assess  -SH Unable to assess  -SH    Base dressing in place, unable to visualize  -DV -- --    Drainage Amount none  -DV -- --    Retired Wound - Properties Group Placement Date: 12/21/23  -HB Placement Time: 2224 -HB Orientation: lower  -HB Location: abdomen  -HB Primary Wound Type: Incision  -HB    Retired Wound - Properties Group  Date first assessed: 12/21/23  -HB Time first assessed: 2224 -HB Location: abdomen  -HB Primary Wound Type: Incision  -HB      Row Name 12/22/23 0048 12/22/23 0045 12/22/23 0030       Wound 12/16/23 Right anterior greater trochanter Incision    Wound - Properties Group Placement Date: 12/16/23  -AG Present on Original Admission: N  -AG Side: Right  -AG Orientation: anterior  -AG Location: greater trochanter  -AG Primary Wound Type: Incision  -AG Wound Outcome: Other  -AG, healing     Retired Wound - Properties Group Placement Date: 12/16/23  -AG Present on Original Admission: N  -AG Side: Right  -AG Orientation: anterior  -AG Location: greater trochanter  -AG Primary Wound Type: Incision  -AG Wound Outcome: Other  -AG, healing     Retired Wound - Properties Group Date first assessed: 12/16/23  -AG Present on Original Admission: N  -AG Side: Right  -AG Location: greater trochanter  -AG Primary Wound Type: Incision  -AG Wound Outcome: Other  -AG, healing        Wound 12/21/23 1844 abdomen Incision    Wound - Properties Group Placement Date: 12/21/23  -MT Placement Time: 1844 -MT Location: abdomen  -MT Primary Wound Type: Incision  -MT, x5     Dressing Appearance dry;intact;no drainage  -KS dry;intact;no drainage  -KS dry;intact;no drainage  -KS    Closure Unable to assess  -KS Unable to assess  -KS Unable to assess  -KS    Retired Wound - Properties Group Placement Date: 12/21/23  -MT Placement Time: 1844 -MT Location: abdomen  -MT Primary Wound Type: Incision  -MT, x5     Retired Wound - Properties Group Date first assessed: 12/21/23  -MT Time first assessed: 1844 -MT Location: abdomen  -MT Primary Wound Type: Incision  -MT, x5        Wound 12/21/23 2224 lower abdomen Incision    Wound - Properties Group Placement Date: 12/21/23  -HB Placement Time: 2224 -HB Orientation: lower  -HB Location: abdomen  -HB Primary Wound Type: Incision  -HB    Dressing Appearance dry;intact;no drainage  -KS dry;intact;no drainage   -KS dry;intact;no drainage  -KS    Closure Unable to assess  -KS Unable to assess  -KS Unable to assess  -KS    Retired Wound - Properties Group Placement Date: 12/21/23  -HB Placement Time: 2224 -HB Orientation: lower  -HB Location: abdomen  -HB Primary Wound Type: Incision  -HB    Retired Wound - Properties Group Date first assessed: 12/21/23  -HB Time first assessed: 2224 -HB Location: abdomen  -HB Primary Wound Type: Incision  -HB      Row Name 12/22/23 0015 12/22/23 0000 12/21/23 2344       Wound 12/16/23 Right anterior greater trochanter Incision    Wound - Properties Group Placement Date: 12/16/23  -AG Present on Original Admission: N  -AG Side: Right  -AG Orientation: anterior  -AG Location: greater trochanter  -AG Primary Wound Type: Incision  -AG Wound Outcome: Other  -AG, healing     Retired Wound - Properties Group Placement Date: 12/16/23  -AG Present on Original Admission: N  -AG Side: Right  -AG Orientation: anterior  -AG Location: greater trochanter  -AG Primary Wound Type: Incision  -AG Wound Outcome: Other  -AG, healing     Retired Wound - Properties Group Date first assessed: 12/16/23  -AG Present on Original Admission: N  -AG Side: Right  -AG Location: greater trochanter  -AG Primary Wound Type: Incision  -AG Wound Outcome: Other  -AG, healing        Wound 12/21/23 1844 abdomen Incision    Wound - Properties Group Placement Date: 12/21/23  -MT Placement Time: 1844 -MT Location: abdomen  -MT Primary Wound Type: Incision  -MT, x5     Dressing Appearance dry;intact;no drainage  -KS dry;intact;no drainage  -KS --    Closure Unable to assess  -KS Unable to assess  -KS --    Dressing Care -- -- dressing applied  staples, covaderm x 5  -HB    Retired Wound - Properties Group Placement Date: 12/21/23  -MT Placement Time: 1844 -MT Location: abdomen  -MT Primary Wound Type: Incision  -MT, x5     Retired Wound - Properties Group Date first assessed: 12/21/23  -MT Time first assessed: 1844 -MT Location:  abdomen  -MT Primary Wound Type: Incision  -MT, x5        Wound 12/21/23 2224 lower abdomen Incision    Wound - Properties Group Placement Date: 12/21/23  -HB Placement Time: 2224 -HB Orientation: lower  -HB Location: abdomen  -HB Primary Wound Type: Incision  -HB    Dressing Appearance dry;intact;no drainage  -KS dry;intact;no drainage  -KS --    Closure Unable to assess  -KS Unable to assess  -KS --    Retired Wound - Properties Group Placement Date: 12/21/23  -HB Placement Time: 2224 -HB Orientation: lower  -HB Location: abdomen  -HB Primary Wound Type: Incision  -HB    Retired Wound - Properties Group Date first assessed: 12/21/23  -HB Time first assessed: 2224 -HB Location: abdomen  -HB Primary Wound Type: Incision  -HB      Row Name 12/21/23 2324             Wound 12/16/23 Right anterior greater trochanter Incision    Wound - Properties Group Placement Date: 12/16/23  -AG Present on Original Admission: N  -AG Side: Right  -AG Orientation: anterior  -AG Location: greater trochanter  -AG Primary Wound Type: Incision  -AG Wound Outcome: Other  -AG, healing     Retired Wound - Properties Group Placement Date: 12/16/23  -AG Present on Original Admission: N  -AG Side: Right  -AG Orientation: anterior  -AG Location: greater trochanter  -AG Primary Wound Type: Incision  -AG Wound Outcome: Other  -AG, healing     Retired Wound - Properties Group Date first assessed: 12/16/23  -AG Present on Original Admission: N  -AG Side: Right  -AG Location: greater trochanter  -AG Primary Wound Type: Incision  -AG Wound Outcome: Other  -AG, healing        Wound 12/21/23 1844 abdomen Incision    Wound - Properties Group Placement Date: 12/21/23  -MT Placement Time: 1844 -MT Location: abdomen  -MT Primary Wound Type: Incision  -MT, x5     Retired Wound - Properties Group Placement Date: 12/21/23  -MT Placement Time: 1844 -MT Location: abdomen  -MT Primary Wound Type: Incision  -MT, x5     Retired Wound - Properties Group Date  first assessed: 12/21/23  -MT Time first assessed: 1844 -MT Location: abdomen  -MT Primary Wound Type: Incision  -MT, x5        Wound 12/21/23 2224 lower abdomen Incision    Wound - Properties Group Placement Date: 12/21/23  -HB Placement Time: 2224 -HB Orientation: lower  -HB Location: abdomen  -HB Primary Wound Type: Incision  -HB    Dressing Care dressing applied  staples, covaderm  -HB      Retired Wound - Properties Group Placement Date: 12/21/23 -HB Placement Time: 2224 -HB Orientation: lower  -HB Location: abdomen  -HB Primary Wound Type: Incision  -HB    Retired Wound - Properties Group Date first assessed: 12/21/23  -HB Time first assessed: 2224 -HB Location: abdomen  -HB Primary Wound Type: Incision  -HB              User Key  (r) = Recorded By, (t) = Taken By, (c) = Cosigned By      Initials Name Provider Type     Courtney Graham RN Registered Nurse    Ewelina Keller RN Registered Nurse    Amrik Donato RN Registered Nurse    Amelia Blackwood LPN Licensed Nurse    Belinda Lugo RN Registered Nurse     Marivel Martínez LPN Licensed Nurse                      Assessment & Plan        acetaminophen, 1,000 mg, Oral, Q6H  cefTRIAXone, 2,000 mg, Intravenous, Daily  doxycycline, 100 mg, Intravenous, Q12H  escitalopram, 20 mg, Oral, Daily  levothyroxine, 25 mcg, Oral, Q AM  lisinopril, 10 mg, Oral, Daily  metroNIDAZOLE, 500 mg, Intravenous, Q8H  pantoprazole, 40 mg, Oral, Q AM  senna-docusate sodium, 2 tablet, Oral, BID  sodium chloride, 10 mL, Intravenous, Q12H       HYDROmorphone HCl-NaCl,   lactated ringers, 100 mL/hr, Last Rate: 100 mL/hr (12/22/23 0200)  Pharmacy Consult,   sodium chloride, 30 mL/hr  sodium chloride, 30 mL/hr, Last Rate: 30 mL/hr (12/22/23 1025)         Active Hospital Problems:  Active Hospital Problems    Diagnosis     **Perforated diverticulum     Mood disorder     Hypertension     Disease of thyroid gland     Tubo-ovarian mass         Assessment/plan:    Diverticulitis/tubo-ovarian abscess      -supportive care, pain control, Rocephin/doxycycline/Flagyl       -urine culture showed no growth        - S/p robotic cory colostomy with drainage of abscess and left salpingo-oophorectomy (12/21/2023), on  PCA pump for pain control    Hypertension     -Lisinopril    Hypothyroidism     -Synthroid    Depression      -Lexapro    DVT prophylaxis:  Mechanical DVT prophylaxis orders are present.    CODE STATUS:    Level Of Support Discussed With: Patient  Code Status (Patient has no pulse and is not breathing): CPR (Attempt to Resuscitate)  Medical Interventions (Patient has pulse or is breathing): Full      Disposition:  I expect patient to be discharged 4 to 5 days.    This patient has been  and discussed with . 12/22/23      Electronically signed by Dg Payne MD, 12/22/23, 13:38 EST.  [facility] Hospitalist Team

## 2023-12-22 NOTE — PAYOR COMM NOTE
"Charity Menchaca (63 y.o. Female)       Date of Birth   1960    Social Security Number       Address   [address] [place] [address]    Home Phone   [phone]    MRN   [id number]       Jainism   None    Marital Status                               Admission Date   12/16/23    Admission Type   Urgent    Admitting Provider   Kleber Steele MD    Attending Provider   Dg Payne MD    Department, Room/Bed   [facility] SURGICAL INPATIENT, 4105/1       Discharge Date       Discharge Disposition       Discharge Destination                                 Attending Provider: Dg Payne MD    Allergies: No Known Allergies    Isolation: None   Infection: None   Code Status: CPR    Ht: 154.9 cm (61\")   Wt: 70.2 kg (154 lb 12.2 oz)    Admission Cmt: None   Principal Problem: Perforated diverticulum [K57.80]                   Active Insurance as of 12/16/2023       Primary Coverage       Payor Plan Insurance Group Employer/Plan Group    ANTHEM BLUE CROSS ANTHEM BLUE CROSS BLUE SHIELD PPO [id number]       Payor Plan Address Payor Plan Phone Number Payor Plan Fax Number Effective Dates    [address] [phone]  1/1/2022 - None Entered    [facility] [id number]         Subscriber Name Subscriber Birth Date Member ID       CHARITY MENCHACA 1960 [id number]                     Emergency Contacts        (Rel.) Home Phone Work Phone Mobile Phone    SHAHLA MENCHACA (Spouse) [phone] -- [phone]                 Operative/Procedure Notes (last 72 hours)        Juju Navarrete MD at 12/21/23 1844       Attestation signed by Kem Staples MD at 12/21/23 8357    A cystoscopy was performed at the conclusion the procedure to document integrity of the urologic tract.  Water was used as distending and visualize medium.  There was integrity to the bladder noted.  There is good ureteral jets noted from each orifice with clear urine              "   Subjective    Date of Service:  12/21/23    Pre-operative diagnosis(es): Pelvic abscess     Post-operative diagnosis(es): Mesenteric abscess, extending to the left ovay   Procedure(s): Robotic assisted left salpingo-oophorectomy, adhesiolysis, and cystoscopy. Please see colorectal surgery note for other procedures performed.          Surgeon:    Juju Navarrete MD, MPH  Assistant: Kem Staples MD, MPH       EBL: Minimal blood loss from above procedure, but final EBL pending, see CRS note         Anesthesia:  General Anesthesia       Objective      Operative findings: There was extensive adhesions of left sigmoid colon to left lateral sidewall and involving ovarian cortex.   There was no apparent tubo-ovarian abscess. Inflamed left ovary and fallopian tube, with no involvement of left cornua of the uterus. Left ovary and colon adherent to the left round ligament.  Normal appearing uterus, right ovary, and right fallopian tube. Normal posterior cul de sac.     Description of Procedure:   I saw the patient at the pre-op area and again discussed the risks, benefits, and alternatives of the procedure.     The patient had previously been placed on antibiotic therapy for assumed TOA, so additional prophylactic antibodies were not indicated from GYN surgical standpoint.  She was transferred to the operating room after being consented. She was placed under general endotracheal anesthesia. An OG tube was placed. She was placed in low lithotomy position using Jakob stirrups and positioned on the Trenguard.   She was prepped and draped in usual sterile fashion. Yates catheter was placed on the field. A Anya 2 cannula with 3.5 Koh ring and 6cm tip was used for uterine manipulation.     Gloves were exchanged and attention was turned to the abdomen. Direct entry was made using 5mm scope to the left upper quadrant, then pneumoperitoneum was established. No evidence of injury on entry.  Under direct visualization robotic ports  were placed at the umbilicus for the camera with 2 operating 8 millimeter ports to the right, followed by 1- 8 mm operating port to the left and a 12 mm port in the far left flank for the assistant port.      As mentioned in above findings, the sigmoid colon was densely adherent to the left lateral sidewall and adnexa. Dr. Vasquez initiated surgery at the console, see his operative report for full details. Upon his dissection of the colon off the left lateral sidewall, the capsule of the abscess was visualized and the pelvis was extensively irrigated to remove abscess contents. The dense adhesions from the sigmoid colon was carefully dissected until the left adnexa was accessed. Again, see CRS op note for complete details. There was no evidence of abscess involvement from left ovary and fallopian tube, but there was dense adhesions of the left ovary to the round ligament.   I took over at the console to initiate the above surgery. The left cornua of the uterus was grasped and the uterus was elevated. There was no involvement to the posterior cul de sac. The posterior adhesions of ovary to the left round ligament were carefully dissected using sharp and blunt dissection off the left ovary and fallopian tube.  The peritoneum of the broad ligament was opened under the left round ligament and the ureter and external iliac vessels were visualized along the left lateral sidewall. The adhesions were carefully dissected to the infundibulopelvic ligament. The infundibulopelvic ligament was visualized and the ovarian vessels were intact. Attention was then turned to the left uteroovarian ligament, it was grasped, cauterized and cut  with the vessel sealer up to the mesosalpinx. The mesosalpinx was also grasped, ligated and cut, then followed up to the ovary to allow complete removal of the left fallopian tube. The IP ligament was then clamped with a large clip applier, and the vessel sealer was then used to cauterize and cut  the IP ligament with good hemostasis noted.  This allowed complete resection of the left ovary and fallopian tube. An Endocatch bag was placed through the 11 port, and the left ovary and fallopian tube was placed into the bag and removed through the port. The left adnexa was irrigated and hemostasis was confirmed.     At the conclusion of the left salpingo-oophorectomy, Dr. Vasquez resumed his procedure at the console. Please see his note for full details.    Colposcopy to be performed by Dr. Staples at the conclusion of the procedure and removal of the aurelio manipulator       Sponge, lap, and needle counts were correct during the procedure.     Dr. Staples was the first assistant for the case.         Specimens removed:   Left ovary and fallopian tube     Complications:   None     Condition:   stable     Disposition:   to PACU, patient will remain inpatient for post-operative care               Juju Navarrete MD  12/21/23  22:06 EST    Electronically signed by Kem Staples MD at 12/21/23 4011       Harvinder Vasquez MD at 12/21/23 9814          TOTAL LAPAROSCOPIC HYSTERECTOMY BILATERAL SALPINGOOPHORECTOMY, NODE DISSECTION WITH DAVINCI ROBOT  Progress Note    Charity Mcclure  12/21/2023    Pre-op Diagnosis:   Pelvic abscess        Post-Op Diagnosis Codes:   Pelvic abscess involving sigmoid mesentery and tuboovarian     Procedure/CPT® Codes:        Procedure(s):  left SALPINGOOPHORECTOMY,  cysto WITH DAVINCI ROBOT, sigmoid resection, drainage of abcess, creation of colostomy              Surgeon(s):  Harvinder Vasquez MD Almaz, Biruk, MD Coates, Tawana, MD Baldwin, Stephen M, MD Baldwin, Stephen M, MD    Anesthesia: General    Staff:   Circulator: Courtney Graham RN; Lizzeth Bautista RN; Raeann Soto RN; Amrik Kamara RN  Scrub Person: Ila Schmidt RN; Stacey Ramos; Lorena Seo         Estimated Blood Loss: 200ml    Urine Voided: * No values recorded between 12/21/2023  6:10 PM and  12/21/2023 11:51 PM *    Specimens:                          Drains:   Closed/Suction Drain Medial LLQ Bulb 19 Fr. (Active)   Dressing Status Clean;Dry;Intact 12/21/23 4726       Colostomy (Active)       Urethral Catheter Non-latex 16 Fr. (Active)       Findings: large abscess cavity with jcarlos purulent collection, friable mesentery         Complications: None           Harvinder Vasquez MD     Date: 12/22/2023  Time: 00:01 EST          Electronically signed by Harvinder Vasquez MD at 12/22/23 0002          Physician Progress Notes (last 48 hours)        Kem Staples MD at 12/22/23 0708          Postop day 1.    Sigmoid colon resection with temporary diverting colostomy.  Left salpingo oophorectomy.    Vital signs are stable and she is afebrile.  Hemo-globin stable at 9.4.  Intraperitoneal drain with minimal output.    Resting in bed comfortably.  Conversant and oriented.  Pain moderately controlled    Good initial recovery from surgery.    Continue IV antibiotics until no leukocytosis.  Otherwise routine postsurgical care      Electronically signed by Kem Staples MD at 12/22/23 0712       Juju Navarrete MD at 12/21/23 1710          Plan for surgical management today d/t assumed tuboovarian abscess.   Patient has been on triple IV abx since admission.   Still having mild pain and leokocytosis. Has been afebrile for last 24 hours.   Due to pain and leukocytosis, patient has been counseled on USO, removal/drainage of TOA. Also counseled on possible hysterectomy with bilateral salpingo-oophorectomy due to extent of TOA and pelvic adhesions. Patient has been counseled on r/b/a to surgical management, and reiterated again, prior to surgery.   Surgery to be performed and will consult CRS if needed due to earlier images that were concerning for perforated diverticulum.    Hb 9.3 on 12/20, WBCs decreasing now 16.9 but still elevated, normal plts of 355k.   Has an elevated , likely d/t infectious TOA.          Electronically signed by Juju Navarrete MD at 23 2978       Dg Payne MD at 23 3290              [facility] Medicine Services Daily Progress Note    Patient Name: Charity Mcclure  : 1960  MRN: [id number]  Primary Care Physician:  Meron Whittaker MD  Date of admission: 2023      Subjective      Chief Complaint: Abdominal Pain     Brief interim history: 63-year-old hypertension, hypothyroidism, hypertension, mood disorder, and right hip repair.  Patient was admitted on 2023  presented to [facility] ED, complaining of abdominal pain with subjective fever chills and malaise.  Patient initially thought she might have had a flu.  However, she continues to feel bad and developed nonbloody diarrhea, and poor oral intake.  Was initially seen at [facility] in [place] ED and CT of the abdomen/pelvis at that facility revealed perforated diverticulum with tubo-ovarian mass, concerning for possible abscess.  Laboratories notable for leukocytosis with WBC of 21 K, CT of the abdomen/pelvis with contrast (), showed a left anterior pelvis 6.5 x 6.2 cm irregular rim enhancing fluid collection/cystic mass with surrounding inflammatory changes, and imaging features suggest infection such as tubal ovarian abscess or previous bout of perforated diverticulitis with resultant abscess formation.   She is admitted with principal diagnosis of suspected diverticulitis/tubo-ovarian abscess.  She was seen in consultation by OB/GYN and general surgery with recommendations, and plan for surgical intervention tentatively scheduled for tomorrow.    2023: Seen and examined in follow-up.  Complain of mild lower abdominal quadrant discomfort, otherwise, no events reported overnight.    2023: Seen and examined in follow-up.  No complaints or event overnight and plan for surgical intervention scheduled for today    Vitals:   Temp:  [98.7 °F (37.1 °C)-98.9 °F (37.2 °C)]  98.9 °F (37.2 °C)  Heart Rate:  [66-75] 75  Resp:  [13-18] 18  BP: ()/(66-74) 94/66    Physical Exam  Constitutional:       General: She is not in acute distress.     Appearance: She is obese. She is not ill-appearing.   HENT:      Head: Normocephalic.      Right Ear: Tympanic membrane normal.      Nose: Nose normal.   Eyes:      Pupils: Pupils are equal, round, and reactive to light.   Cardiovascular:      Rate and Rhythm: Normal rate.      Pulses: Normal pulses.   Pulmonary:      Effort: Pulmonary effort is normal.   Abdominal:      Palpations: Abdomen is soft.   Musculoskeletal:         General: Normal range of motion.      Cervical back: Neck supple.   Skin:     General: Skin is warm.   Neurological:      General: No focal deficit present.      Mental Status: She is alert.             Result Review    Result Review:  I have personally reviewed the results from the time of this admission to 12/21/2023 13:38 EST and agree with these findings:  []  Laboratory  []  Microbiology  []  Radiology  []  EKG/Telemetry   []  Cardiology/Vascular   []  Pathology  []  Old records  []  Other:  Most notable findings include:     Wounds (last 24 hours)       LDA Wound       Row Name 12/21/23 0713 12/21/23 0400 12/21/23 0000       Wound 12/16/23 Right anterior greater trochanter Incision    Wound - Properties Group Placement Date: 12/16/23  -AG Present on Original Admission: N  -AG Side: Right  -AG Orientation: anterior  -AG Location: greater trochanter  -AG Primary Wound Type: Incision  -AG Wound Outcome: Other  -AG, healing     Dressing Appearance dry;intact;open to air  scabbed  -DV dry;intact;open to air  -SH dry;intact;open to air  -SH    Drainage Amount none  -DV -- --    Dressing Care open to air  -DV -- --    Retired Wound - Properties Group Placement Date: 12/16/23  -AG Present on Original Admission: N  -AG Side: Right  -AG Orientation: anterior  -AG Location: greater trochanter  -AG Primary Wound Type: Incision   -AG Wound Outcome: Other  -AG, healing     Retired Wound - Properties Group Date first assessed: 12/16/23  -AG Present on Original Admission: N  -AG Side: Right  -AG Location: greater trochanter  -AG Primary Wound Type: Incision  -AG Wound Outcome: Other  -AG, healing       Row Name 12/20/23 2007             Wound 12/16/23 Right anterior greater trochanter Incision    Wound - Properties Group Placement Date: 12/16/23  -AG Present on Original Admission: N  -AG Side: Right  -AG Orientation: anterior  -AG Location: greater trochanter  -AG Primary Wound Type: Incision  -AG Wound Outcome: Other  -AG, healing     Dressing Appearance dry;intact;open to air  -SH      Dressing Care open to air  -SH      Retired Wound - Properties Group Placement Date: 12/16/23  -AG Present on Original Admission: N  -AG Side: Right  -AG Orientation: anterior  -AG Location: greater trochanter  -AG Primary Wound Type: Incision  -AG Wound Outcome: Other  -AG, healing     Retired Wound - Properties Group Date first assessed: 12/16/23  -AG Present on Original Admission: N  -AG Side: Right  -AG Location: greater trochanter  -AG Primary Wound Type: Incision  -AG Wound Outcome: Other  -AG, healing               User Key  (r) = Recorded By, (t) = Taken By, (c) = Cosigned By      Initials Name Provider Type    Amelia Blackwood LPN Licensed Nurse     Belinda Jacinto RN Registered Nurse     Marivel Martínez LPN Licensed Nurse                      Assessment & Plan        cefTRIAXone, 2,000 mg, Intravenous, Daily  doxycycline, 100 mg, Intravenous, Q12H  escitalopram, 20 mg, Oral, Daily  levothyroxine, 25 mcg, Oral, Q AM  lisinopril, 10 mg, Oral, Daily  metroNIDAZOLE, 500 mg, Intravenous, Q8H  pantoprazole, 40 mg, Oral, Q AM  senna-docusate sodium, 2 tablet, Oral, BID  sodium chloride, 10 mL, Intravenous, Q12H             Active Hospital Problems:  Active Hospital Problems    Diagnosis     **Perforated diverticulum     Mood disorder     Hypertension      Disease of thyroid gland     Tubo-ovarian mass        Assessment/plan:    Diverticulitis/tubo-ovarian abscess      -supportive care, pain control, Rocephin/doxycycline/Flagyl       -urine culture showed no growth        -planned  OR today for surgical intervention    Hypertension     -Lisinopril    Hypothyroidism     -Synthroid    Depression      -Lexapro    DVT prophylaxis:  Mechanical DVT prophylaxis orders are present.    CODE STATUS:    Level Of Support Discussed With: Patient  Code Status (Patient has no pulse and is not breathing): CPR (Attempt to Resuscitate)  Medical Interventions (Patient has pulse or is breathing): Full Support      Disposition:  I expect patient to be discharged 4 to 5 days.    This patient has been  and discussed with . 23      Electronically signed by Dg Payne MD, 23, 13:38 EST.  [facility] Hospitalist Team             Electronically signed by Dg Payne MD at 23 1340       Harvinder Vasquez MD at 23 1010          Colorectal Surgery Progress Note    Pt. Name/Age/:  Charity Mcclure   63 y.o.    1960         Med. Record Number:   [id number]  Date of admission:  2023      Service(s): Colorectal Surgery      Subjective    Doing well.  No acute issues   Vitals without fever   Plan for OR today     Objective  Vitals:     Patient Vitals for the past 24 hrs:   BP Temp Temp src Pulse Resp SpO2   23 0900 94/66 -- -- 75 -- --   23 0500 133/74 98.7 °F (37.1 °C) Axillary 66 13 97 %   23 1247 137/80 -- -- -- -- 97 %   23 1220 128/80 99 °F (37.2 °C) Oral -- 24 --           Wt. Admission: Weight: 70.2 kg (154 lb 12.2 oz)     Wt. Current: Weight: 70.2 kg (154 lb 12.2 oz)   Body mass index is 29.24 kg/m².      I&O:  Intake/Output Summary (Last 24 hours) at 2023 1010  Last data filed at 2023 0717  Gross per 24 hour   Intake 1634 ml   Output --   Net 1634 ml     121901 -  0700  In: 8.6 [P.O.:960;  I.V.:978.6]  Out: -       Exam  General:  No acute distress, resting comfortably.  Cardiovascular: regular rate.  Respiratory: no increased work of breathing.  Abdomen:  Soft, minimal tender , no rebound or guarding       Labs:     [unfilled]          Scheduled Meds:cefTRIAXone, 2,000 mg, Intravenous, Daily  doxycycline, 100 mg, Intravenous, Q12H  escitalopram, 20 mg, Oral, Daily  levothyroxine, 25 mcg, Oral, Q AM  lisinopril, 10 mg, Oral, Daily  metroNIDAZOLE, 500 mg, Intravenous, Q8H  pantoprazole, 40 mg, Oral, Q AM  senna-docusate sodium, 2 tablet, Oral, BID  sodium chloride, 10 mL, Intravenous, Q12H      Continuous Infusions:   PRN Meds:.  acetaminophen    senna-docusate sodium **AND** polyethylene glycol **AND** bisacodyl **AND** bisacodyl    Calcium Replacement - Follow Nurse / BPA Driven Protocol    HYDROmorphone    HYDROmorphone    ketorolac    Magnesium Standard Dose Replacement - Follow Nurse / BPA Driven Protocol    nitroglycerin    ondansetron **OR** ondansetron    oxyCODONE    Phosphorus Replacement - Follow Nurse / BPA Driven Protocol    Potassium Replacement - Follow Nurse / BPA Driven Protocol    sodium chloride    sodium chloride          Assessment  63 y.o. female with diverticulitis and tubo-ovarian abscess      Plan / Recommendations     - OR today with gyn.      - I have discussed the risk and potential complication associated with colon resection. I have discussed with her if colon is not primarily and significantly inflamed, I will hold off on resection     - We will adhere partially to ERAS: preop DVT prophylaxis, preop pain meds, and minimize intra-op IV fluid.       10:10 EST; 2023        Harvinder Vasquez MD  Colon and Rectal Surgery   [facility]        Electronically signed by Harvinder Vasquez MD at 23 1012       Dg Payne MD at 23 5793              [facility] Medicine Services Daily Progress Note    Patient Name: Charity cMclure  :  1960  MRN: [id number]  Primary Care Physician:  Meron Whittaker MD  Date of admission: 12/16/2023      Subjective      Chief Complaint: Abdominal Pain     Brief interim history: 63-year-old hypertension, hypothyroidism, hypertension, mood disorder, and right hip repair.  Patient was admitted on 12/16/2023  presented to [facility] ED, complaining of abdominal pain with subjective fever chills and malaise.  Patient initially thought she might have had a flu.  However, she continues to feel bad and developed nonbloody diarrhea, and poor oral intake.  Was initially seen at [facility] ED and CT of the abdomen/pelvis at that facility revealed perforated diverticulum with tubo-ovarian mass, concerning for possible abscess.  Laboratories notable for leukocytosis with WBC of 21 K, CT of the abdomen/pelvis with contrast (6/19), showed a left anterior pelvis 6.5 x 6.2 cm irregular rim enhancing fluid collection/cystic mass with surrounding inflammatory changes, and imaging features suggest infection such as tubal ovarian abscess or previous bout of perforated diverticulitis with resultant abscess formation.   She is admitted with principal diagnosis of suspected diverticulitis/tubo-ovarian abscess.  She was seen in consultation by OB/GYN and general surgery with recommendations, and plan for surgical intervention tentatively scheduled for tomorrow.    12/20/2023: Seen and examined in follow-up.  Complain of mild lower abdominal quadrant discomfort, otherwise, no events reported overnight.    Vitals:   Temp:  [97.5 °F (36.4 °C)-99 °F (37.2 °C)] 99 °F (37.2 °C)  Heart Rate:  [71-75] 75  Resp:  [12-24] 24  BP: ()/(61-80) 137/80    Physical Exam  Constitutional:       General: She is not in acute distress.     Appearance: She is obese. She is not ill-appearing.   HENT:      Head: Normocephalic.      Right Ear: Tympanic membrane normal.      Nose: Nose normal.   Eyes:      Pupils: Pupils are equal, round, and  reactive to light.   Cardiovascular:      Rate and Rhythm: Normal rate.      Pulses: Normal pulses.   Pulmonary:      Effort: Pulmonary effort is normal.   Abdominal:      Palpations: Abdomen is soft.   Musculoskeletal:         General: Normal range of motion.      Cervical back: Neck supple.   Skin:     General: Skin is warm.   Neurological:      General: No focal deficit present.      Mental Status: She is alert.             Result Review    Result Review:  I have personally reviewed the results from the time of this admission to 12/20/2023 18:28 EST and agree with these findings:  []  Laboratory  []  Microbiology  []  Radiology  []  EKG/Telemetry   []  Cardiology/Vascular   []  Pathology  []  Old records  []  Other:  Most notable findings include:     Wounds (last 24 hours)       LDA Wound       Row Name 12/20/23 0830 12/20/23 0352 12/20/23 0009       Wound 12/16/23 Right anterior greater trochanter Incision    Wound - Properties Group Placement Date: 12/16/23  -AG Present on Original Admission: N  -AG Side: Right  -AG Orientation: anterior  -AG Location: greater trochanter  -AG Primary Wound Type: Incision  -AG Wound Outcome: Other  -AG, healing     Dressing Appearance dry;intact;open to air  -DV dry;intact;open to air  -AJ intact;dry;open to air  -AJ    Dressing Care open to air  -DV -- --    Retired Wound - Properties Group Placement Date: 12/16/23  -AG Present on Original Admission: N  -AG Side: Right  -AG Orientation: anterior  -AG Location: greater trochanter  -AG Primary Wound Type: Incision  -AG Wound Outcome: Other  -AG, healing     Retired Wound - Properties Group Date first assessed: 12/16/23  -AG Present on Original Admission: N  -AG Side: Right  -AG Location: greater trochanter  -AG Primary Wound Type: Incision  -AG Wound Outcome: Other  -AG, healing       Row Name 12/19/23 1929             Wound 12/16/23 Right anterior greater trochanter Incision    Wound - Properties Group Placement Date: 12/16/23   -AG Present on Original Admission: N  -AG Side: Right  -AG Orientation: anterior  -AG Location: greater trochanter  -AG Primary Wound Type: Incision  -AG Wound Outcome: Other  -AG, healing     Dressing Appearance open to air;intact;dry  -AJ      Dressing Care open to air  -AJ      Retired Wound - Properties Group Placement Date: 12/16/23  -AG Present on Original Admission: N  -AG Side: Right  -AG Orientation: anterior  -AG Location: greater trochanter  -AG Primary Wound Type: Incision  -AG Wound Outcome: Other  -AG, healing     Retired Wound - Properties Group Date first assessed: 12/16/23  -AG Present on Original Admission: N  -AG Side: Right  -AG Location: greater trochanter  -AG Primary Wound Type: Incision  -AG Wound Outcome: Other  -AG, healing               User Key  (r) = Recorded By, (t) = Taken By, (c) = Cosigned By      Initials Name Provider Type    Amelia Blackwood LPN Licensed Nurse    Belinda Lugo RN Registered Nurse    Paige Mathews LPN Licensed Nurse                      Assessment & Plan        cefTRIAXone, 2,000 mg, Intravenous, Daily  doxycycline, 100 mg, Intravenous, Q12H  escitalopram, 20 mg, Oral, Daily  levothyroxine, 25 mcg, Oral, Q AM  lisinopril, 10 mg, Oral, Daily  metroNIDAZOLE, 500 mg, Intravenous, Q8H  pantoprazole, 40 mg, Oral, Q AM  senna-docusate sodium, 2 tablet, Oral, BID  sodium chloride, 10 mL, Intravenous, Q12H             Active Hospital Problems:  Active Hospital Problems    Diagnosis     **Perforated diverticulum     Mood disorder     Hypertension     Disease of thyroid gland     Tubo-ovarian mass        Assessment/plan:    Diverticulitis/tubo-ovarian abscess      -supportive care, pain control, Rocephin/doxycycline/Flagyl       -urine culture showed no growth        -plan to take the OR in a.m. OB/GYN/general surgery on board    Hypertension     -Lisinopril    Hypothyroidism     -Synthroid    Depression      -Lexapro    DVT prophylaxis:  Mechanical DVT  prophylaxis orders are present.    CODE STATUS:    Level Of Support Discussed With: Patient  Code Status (Patient has no pulse and is not breathing): CPR (Attempt to Resuscitate)  Medical Interventions (Patient has pulse or is breathing): Full Support      Disposition:  I expect patient to be discharged 4 to 5 days.    This patient has been  and discussed with . 12/20/23      Electronically signed by Dg Payne MD, 12/20/23, 18:28 EST.  [facility] Hospitalist Team             Electronically signed by Dg Payne MD at 12/21/23 1036       Kem Staples MD at 12/20/23 1556          Pelvic Abscess    Does not appear to have perforation    Pt is stable but continues to have pain, leukocytosis and fever    Rec Extirpation of infected tissues     Discussed with patient robotic evaluation of the pelvis        Will attempt to remove just left adnexus, but may require total hysterectomy and bilateral adnexectomy        [place]-Rectal to evaluate for any need to resect colon if involved    No console time available today  Scheduled for 4 PM tomorrow afternoonb    Electronically signed by Kem Staples MD at 12/20/23 1600       Consult Notes (last 48 hours)  Notes from 12/20/23 1122 through 12/22/23 1122   No notes of this type exist for this encounter.

## 2023-12-22 NOTE — NURSING NOTE
WOCN note:    63 yr old female admitted 12/16/23 with a perforated diverticulum. Patient underwent a robotic sigmoid colon resection with Rodriguez's colostomy, drainage of abscess and left salpingo oophorectomy. WOCN consult received for new ostomy education and management.     Patient is awake and alert. Basic ostomy teaching initiated. Patient instructed on pouching system, how to open/close/empty the pouch, diet and bathing. Patient receptive to using a mirror to watch a pouch change today.     The LLQ stoma is pink and moist with intact mucocutaneous junction. The stoma is located in a slight fold and oval in shape. It is flush with the skin and slightly retracted at the lateral edge. There is a crease at 5:00. There is no effluent or flatus noted in the pouch.   The dominga-stomal skin was cleansed and prepped with skin barrier pad. A partial Adapt ring was placed in the crease at 5:00 and along the distal border of the stoma. A [place] one piece cut to fit pouch was applied. Patient may require a convex system prior to discharge.     Patient was instructed on the written materials and the process for ordering supplies as well as the post operative resources available. Supplies and written materials were left at the bedside. We will continue to follow.

## 2023-12-22 NOTE — DISCHARGE PLACEMENT REQUEST
"Charity Menchaca (63 y.o. Female)       Date of Birth   1960    Social Security Number       Address   [address] [place] [address]    Home Phone   [phone]    MRN   [id number]       Judaism   None    Marital Status                               Admission Date   12/16/23    Admission Type   Urgent    Admitting Provider   Kleber Steele MD    Attending Provider   Dg Payne MD    Department, Room/Bed   [facility] SURGICAL INPATIENT, 4105/1       Discharge Date       Discharge Disposition       Discharge Destination                                 Attending Provider: Dg Payne MD    Allergies: No Known Allergies    Isolation: None   Infection: None   Code Status: CPR    Ht: 154.9 cm (61\")   Wt: 70.2 kg (154 lb 12.2 oz)    Admission Cmt: None   Principal Problem: Perforated diverticulum [K57.80]                   Active Insurance as of 12/16/2023       Primary Coverage       Payor Plan Insurance Group Employer/Plan Group    ANTH BLUE CROSS ANTH BLUE CROSS BLUE SHIELD PPO [id number]       Payor Plan Address Payor Plan Phone Number Payor Plan Fax Number Effective Dates    [address] [phone]  1/1/2022 - None Entered    [facility] [address]         Subscriber Name Subscriber Birth Date Member ID       CHARITY MENCHACA 1960 [id number]                     Emergency Contacts        (Rel.) Home Phone Work Phone Mobile Phone    SHAHLA MENCHACA (Spouse) [phone] -- [phone]                "

## 2023-12-22 NOTE — PROGRESS NOTES
Postop day 1.    Sigmoid colon resection with temporary diverting colostomy.  Left salpingo oophorectomy.    Vital signs are stable and she is afebrile.  Hemo-globin stable at 9.4.  Intraperitoneal drain with minimal output.    Resting in bed comfortably.  Conversant and oriented.  Pain moderately controlled    Good initial recovery from surgery.    Continue IV antibiotics until no leukocytosis.  Otherwise routine postsurgical care

## 2023-12-22 NOTE — THERAPY EVALUATION
Patient Name: Charity Mcclure  : 1960    MRN: [id number]                              Today's Date: 2023       Admit Date: 2023    Visit Dx:     ICD-10-CM ICD-9-CM   1. Ovarian dysfunction, unspecified  E28.9 256.9     Patient Active Problem List   Diagnosis    Perforated diverticulum    Mood disorder    Hypertension    Disease of thyroid gland    Tubo-ovarian mass     Past Medical History:   Diagnosis Date    Disease of thyroid gland 2023    Hypertension 2023     History reviewed. No pertinent surgical history.   General Information       Row Name 23 [facility]9          Physical Therapy Time and Intention    Document Type evaluation  -     Mode of Treatment individual therapy  -       Row Name 23 1329          General Information    Patient Profile Reviewed yes  -     Prior Level of Function independent:  pt recovered well from hip surgery in 2023. Had progressed off the walker and resumed independence with ADLs and all mobility.  -     Existing Precautions/Restrictions other (see comments)  abdominal sparing  -     Barriers to Rehab none identified  -[facility] Name 23 1329          Living Environment    People in Home spouse  -[facility] Name 23 1329          Home Main Entrance    Number of Stairs, Main Entrance three  -     Stair Railings, Main Entrance none   provides assist to stabilize  -[facility] Name 23 1329          Cognition    Orientation Status (Cognition) oriented x 4  -[facility] Name 23 1329          Safety Issues, Functional Mobility    Safety Issues Affecting Function (Mobility) ability to follow commands  -     Impairments Affecting Function (Mobility) pain  -               User Key  (r) = Recorded By, (t) = Taken By, (c) = Cosigned By      Initials Name Provider Type     Lizzeth Barahona PT Physical Therapist                   Mobility       Row Name 23 1331          Bed Mobility    Bed  Mobility sit-supine  -     Sit-Supine [place] (Bed Mobility) standby assist  -     Assistive Device (Bed Mobility) bed rails  -AH       Row Name 12/22/23 1331          Bed-Chair Transfer    Bed-Chair [place] (Transfers) contact guard  -     Comment, (Bed-Chair Transfer) no AD used  -AH       Row Name 12/22/23 1331          Sit-Stand Transfer    Sit-Stand [place] (Transfers) standby assist  -     Comment, (Sit-Stand Transfer) no AD. cues for technique to limit abdominal strain  -AH       Row Name 12/22/23 1331          Gait/Stairs (Locomotion)    [place] Level (Gait) standby assist  -     Patient was able to Ambulate yes  -     Distance in Feet (Gait) 15'  -     Deviations/Abnormal Patterns (Gait) cain decreased;base of support, wide  -               User Key  (r) = Recorded By, (t) = Taken By, (c) = Cosigned By      Initials Name Provider Type     Lizzeth Barahona, PT Physical Therapist                   Obj/Interventions       Row Name 12/22/23 1333          Range of Motion Comprehensive    General Range of Motion no range of motion deficits identified  -AH       Row Name 12/22/23 1333          Strength Comprehensive (MMT)    General Manual Muscle Testing (MMT) Assessment no strength deficits identified  -AH       Row Name 12/22/23 1333          Balance    Balance Assessment standing dynamic balance;sitting dynamic balance;sitting static balance  -     Static Sitting Balance independent  -     Dynamic Sitting Balance independent  -     Position, Sitting Balance unsupported;sitting edge of bed  -     Dynamic Standing Balance contact guard  -     Position/Device Used, Standing Balance unsupported  -     Comment, Balance lateral sidestepping without support and no LOB.  -AH       Row Name 12/22/23 1333          Sensory Assessment (Somatosensory)    Sensory Assessment (Somatosensory) sensation intact  -               User Key  (r) = Recorded By, (t) = Taken By, (c) =  Cosigned By      Initials Name Provider Type     Lizzeth Barahona, PT Physical Therapist                   Goals/Plan    No documentation.                  Clinical Impression       [facility] Name 12/22/23 6511          Pain    Additional Documentation Pain Scale: FACES Pre/Post-Treatment (Group)  -[facility] Name 12/22/23 3140          Pain Scale: FACES Pre/Post-Treatment    Pain: FACES Scale, Pretreatment 2-->hurts little bit  -AH     Posttreatment Pain Rating 2-->hurts little bit  -AH     Pain Location lower  -     Pain Location - abdomen  -     Pre/Posttreatment Pain Comment PCA button in hand but pt denies need for it. repositioned for comfort.  -[facility] Name 12/22/23 0846          Plan of Care Review    Plan of Care Reviewed With patient  -     Outcome Evaluation Pt is a 63 y.o. female who presented to [facility] on 12/16 with worsening abdominal pain, nausea and vomitting. Pt was found to have diverticulitis with colon perforation and tubovarian mass. She presents for PT eval status post sigmoid colon resection with temporary diverting colostomy and left salpingo oopherectomy. She demos guarded movements with all mobility but no overt balance or safety deficits. She completes all activity with SBA/CGA, including gait x15' without AD. She has a supportive spouse and a safe DC disposition.  Pt does not have ongoing needs for inpatient skilled physical therapy. No f/u therapy or DME recommended. Will complete.  -[facility] Name 12/22/23 2656          Therapy Assessment/Plan (PT)    Patient/Family Therapy Goals Statement (PT) go home  -     Criteria for Skilled Interventions Met (PT) no;no problems identified which require skilled intervention  -     Therapy Frequency (PT) evaluation only  -[facility] Name 12/22/23 3484          Vital Signs    Pretreatment Heart Rate (beats/min) 81  -AH     Posttreatment Heart Rate (beats/min) 90  -     Pre SpO2 (%) 94  -AH     O2 Delivery Pre Treatment room  air  -     Post SpO2 (%) 96  -     O2 Delivery Post Treatment room air  -       Row Name 12/22/23 1334          Positioning and Restraints    Pre-Treatment Position sitting in chair/recliner  -     Post Treatment Position bed  -     In Bed notified nsg;supine;call light within reach;encouraged to call for assist  drain intact, and drainage amount noted to be mostly unchanged compared to pre-mobility.  -               User Key  (r) = Recorded By, (t) = Taken By, (c) = Cosigned By      Initials Name Provider Type     Lizzeth Barahona, FAVIAN Physical Therapist                   Outcome Measures       Row Name 12/22/23 1347 12/22/23 1048       How much help from another person do you currently need...    Turning from your back to your side while in flat bed without using bedrails? 4  - 4  -DV    Moving from lying on back to sitting on the side of a flat bed without bedrails? 4  - 4  -DV    Moving to and from a bed to a chair (including a wheelchair)? 4  - 4  -DV    Standing up from a chair using your arms (e.g., wheelchair, bedside chair)? 4  - 4  -DV    Climbing 3-5 steps with a railing? 3  - 4  -DV    To walk in hospital room? 4  - 4  -DV    AM-PAC 6 Clicks Score (PT) 23  - 24  -DV    Highest Level of Mobility Goal 7 --> Walk 25 feet or more  - 8 --> Walked 250 feet or more  -DV      Row Name 12/22/23 1347          Functional Assessment    Outcome Measure Options AM-PAC 6 Clicks Basic Mobility (PT)  -               User Key  (r) = Recorded By, (t) = Taken By, (c) = Cosigned By      Initials Name Provider Type     Amelia Hall LPN Licensed Nurse    Lizzeth Plata, PT Physical Therapist                                 Physical Therapy Education       Title: PT OT SLP Therapies (Done)       Topic: Physical Therapy (Done)       Point: Mobility training (Done)       Learning Progress Summary             Patient Acceptance, E, VU by  at 12/22/2023 1348                         Point: Home  exercise program (Done)       Learning Progress Summary             Patient Acceptance, E, VU by  at 12/22/2023 1348                         Point: Body mechanics (Done)       Learning Progress Summary             Patient Acceptance, E, VU by  at 12/22/2023 1348                         Point: Precautions (Done)       Learning Progress Summary             Patient Acceptance, E, VU by  at 12/22/2023 1348                                         User Key       Initials Effective Dates Name Provider Type [facility] 12/04/23 -  Lizzeth Barahona, FAVIAN Physical Therapist PT                  PT Recommendation and Plan     Plan of Care Reviewed With: patient  Outcome Evaluation: Pt is a 63 y.o. female who presented to [facility] on 12/16 with worsening abdominal pain, nausea and vomitting. Pt was found to have diverticulitis with colon perforation and tubovarian mass. She presents for PT eval status post sigmoid colon resection with temporary diverting colostomy and left salpingo oopherectomy. She demos guarded movements with all mobility but no overt balance or safety deficits. She completes all activity with SBA/CGA, including gait x15' without AD. She has a supportive spouse and a safe DC disposition.  Pt does not have ongoing needs for inpatient skilled physical therapy. No f/u therapy or DME recommended. Will complete.     Time Calculation:         PT Charges       Row Name 12/22/23 1350             Time Calculation    Start Time 1248  -      Stop Time 1308  -      Time Calculation (min) 20 min  -      PT Non-Billable Time (min) 0 min  -      PT Received On 12/22/23  -         Time Calculation- PT    Total Timed Code Minutes- PT 0 minute(s)  -                User Key  (r) = Recorded By, (t) = Taken By, (c) = Cosigned By      Initials Name Provider Type     Lizzeth Barahona, FAVIAN Physical Therapist                  Therapy Charges for Today       Code Description Service Date Service Provider  Modifiers Qty    [id number] HC PT EVAL LOW COMPLEXITY 4 12/22/2023 Lizzeth Barahona, PT GP 1            PT G-Codes  Outcome Measure Options: AM-PAC 6 Clicks Basic Mobility (PT)  AM-PAC 6 Clicks Score (PT): 23  PT Discharge Summary  Anticipated Discharge Disposition (PT): home with assist    Lizzeth Barahona, FAVIAN  12/22/2023

## 2023-12-22 NOTE — OP NOTE
CRS Operative Note   Name: Charity Mcclure  : 1960  Date of Surgery: 2023  Pre-op Diagnosis: Perforated diverticulum [K57.80]   Post-op Diagnosis: pelvic abscess   Procedure:   Robotic sigmoid resection with Rodriguez's colostomy   Drainage of pelvic abscess and washout   Surgeon: Harvinder Vasquez MD   Assistants: N/A  Anesthesia: General  IV Fluids: refer to anesthesia record  Estimated Blood Loss: <200cc  Drains: 1 INGRID drain   Implants: none  Specimen:   Sigmoid colon   Left adnexa   Findings     Adhered sigmoid colon to abdominal wall, abscess cavity and left adnexa   Large abscess cavity with jcarlos purulent collection involving mesentery of colon and adnexa  No diverticulum or perforated colon identified.  No intraluminal mass    Colorectal bundle:    -Wound class: contaminated  -Wound protector used: yes  -Separate closing tray used: No     Complications   No complications    .    Procedure:  After appropriate consent including risks, benefits and alternatives was obtained, the patient was brought to the OR, and placed in supine position. Anesthesia was then administered.    All shakira prominences were padded, antibiotics were given, and scds placed.     The patient was prepped and drapped in usual sterile fashion.    Dr. Staples and Dr. Navarrete gained access and placed their robotic and assistant ports.  Their portion of the case will be dictated separately. After ports were placed, I performed diagnostic laparoscopy. There was no peritoneal deposits. There was no purulent or feculent peritonitis. The sigmoid colon was adherent to the left adnexa and abdominal wall. The sigmoid colon and the mesentery was noted to be thickened and inflamed. The rectum was normal in appearance.     The robot was then docked. I bluntly mobilized the sigmoid colon off the abdominal wall and adnexa. There was large abscess cavity with jcarlos purulent collection. The collection was drained and suctioned. I continued to  mobilize the sigmoid colon off, however, the cavity was through the mesentery and adherent to the left adnexa. Given that colon was thickened with out obvious source, I decided the colon should be resected. A mesenteric opening was made at the rectosigmoid junction. The Rectum was stapled across with a single firing of green load 60 mm robotic stapler.    The mesentery to the sigmoid colon was taken using vessel sealer marching towards the proximal colon that was soft and not inflamed. The mesentery to the sigmoid colon was very friable, hardened and thick.     After the sigmoid was mobilized off the abscess cavity and adnexal structure, Dr. Navarrete and Dr. Staples then performed left salpingo oophorectomy.     I then mobilized the descending colon by taking its lateral attachment at the white line of toldt. The retroperitoneum was mobilized off the colon mesentery with blunt dissection.     Given large portion of the inflamed abscess cavity was going to be at the anastomosis site as well as unknown etiology of her pathology, I elected to not do a primary anastomosis. The area was washed and irrigated well. Proximal to the inflammable colon was stapled across using additional load of robotic 60 mm green load stapler. The specimen was grasped and locked with laparoscopic grasper.     The robot was undocked. I made a pfannenstiel incision and cut down to her anterior fascial aponeurosis. The abdomen was entered with blunt dissection. Raj wound protector was placed. The specimen was exteriorized and opened in the back table. There was no intraluminal mass or diverticulum. However there was a bulge from the abscess cavity towards the lumen.       The abdomen was irrigated and suctioned once more. A 19 fr INGRID drain was placed in the pelvis traversing over the abscess cavity.     A circular incision was made for the colostomy site. A dissection through soft tissue bluntly and division longitudinally of the anterior and  posterior fascia with splitting of the rectus muscle was performed. Two fingers were able to be easily passed through this defect.     A small Raj wound retractor was placed and the colon was brought out through the incision.    The robotic 12 mm port and the 12 mm airoseal trocar fascia was closed using #0 vicryl. The fascia at the pfannenstiel was closed with #0 stratifix. All wounds were irrigated. The skin was closed with staples.       The colostomy was then matured using 3-0 vicryl with Intervening interrupted vicryl stitches were placed radially to secure the colostomy to the dermis.     Dressings and ostomy bag were applied and the case was concluded.    Counts: Instrument, sponge, and needle counts were reported to be correct prior to closure and at the conclusion of the case.  Disposition  The patient was taken to PACU in good condition.      Harvinder Vasquez MD  Colon and Rectal Surgery   [facility]

## 2023-12-22 NOTE — OP NOTE
Subjective     Date of Service:  12/21/23    Pre-operative diagnosis(es): Pelvic abscess     Post-operative diagnosis(es): Mesenteric abscess, extending to the left ovay   Procedure(s): Robotic assisted left salpingo-oophorectomy, adhesiolysis, and cystoscopy. Please see colorectal surgery note for other procedures performed.          Surgeon:    Juju Navarrete MD, MPH  Assistant: Kem Staples MD, MPH       EBL: Minimal blood loss from above procedure, but final EBL pending, see CRS note         Anesthesia:  General Anesthesia       Objective      Operative findings: There was extensive adhesions of left sigmoid colon to left lateral sidewall and involving ovarian cortex.   There was no apparent tubo-ovarian abscess. Inflamed left ovary and fallopian tube, with no involvement of left cornua of the uterus. Left ovary and colon adherent to the left round ligament.  Normal appearing uterus, right ovary, and right fallopian tube. Normal posterior cul de sac.     Description of Procedure:   I saw the patient at the pre-op area and again discussed the risks, benefits, and alternatives of the procedure.     The patient had previously been placed on antibiotic therapy for assumed TOA, so additional prophylactic antibodies were not indicated from GYN surgical standpoint.  She was transferred to the operating room after being consented. She was placed under general endotracheal anesthesia. An OG tube was placed. She was placed in low lithotomy position using Jakob stirrups and positioned on the Trenguard.   She was prepped and draped in usual sterile fashion. Yates catheter was placed on the field. A Anya 2 cannula with 3.5 Koh ring and 6cm tip was used for uterine manipulation.     Gloves were exchanged and attention was turned to the abdomen. Direct entry was made using 5mm scope to the left upper quadrant, then pneumoperitoneum was established. No evidence of injury on entry.  Under direct visualization robotic ports  were placed at the umbilicus for the camera with 2 operating 8 millimeter ports to the right, followed by 1- 8 mm operating port to the left and a 12 mm port in the far left flank for the assistant port.      As mentioned in above findings, the sigmoid colon was densely adherent to the left lateral sidewall and adnexa. Dr. Vasquez initiated surgery at the console, see his operative report for full details. Upon his dissection of the colon off the left lateral sidewall, the capsule of the abscess was visualized and the pelvis was extensively irrigated to remove abscess contents. The dense adhesions from the sigmoid colon was carefully dissected until the left adnexa was accessed. Again, see CRS op note for complete details. There was no evidence of abscess involvement from left ovary and fallopian tube, but there was dense adhesions of the left ovary to the round ligament.   I took over at the console to initiate the above surgery. The left cornua of the uterus was grasped and the uterus was elevated. There was no involvement to the posterior cul de sac. The posterior adhesions of ovary to the left round ligament were carefully dissected using sharp and blunt dissection off the left ovary and fallopian tube.  The peritoneum of the broad ligament was opened under the left round ligament and the ureter and external iliac vessels were visualized along the left lateral sidewall. The adhesions were carefully dissected to the infundibulopelvic ligament. The infundibulopelvic ligament was visualized and the ovarian vessels were intact. Attention was then turned to the left uteroovarian ligament, it was grasped, cauterized and cut  with the vessel sealer up to the mesosalpinx. The mesosalpinx was also grasped, ligated and cut, then followed up to the ovary to allow complete removal of the left fallopian tube. The IP ligament was then clamped with a large clip applier, and the vessel sealer was then used to cauterize and cut  the IP ligament with good hemostasis noted.  This allowed complete resection of the left ovary and fallopian tube. An Endocatch bag was placed through the 11 port, and the left ovary and fallopian tube was placed into the bag and removed through the port. The left adnexa was irrigated and hemostasis was confirmed.     At the conclusion of the left salpingo-oophorectomy, Dr. Vasquez resumed his procedure at the console. Please see his note for full details.    Colposcopy to be performed by Dr. Staples at the conclusion of the procedure and removal of the aurelio manipulator       Sponge, lap, and needle counts were correct during the procedure.     Dr. Staples was the first assistant for the case.         Specimens removed:   Left ovary and fallopian tube     Complications:   None     Condition:   stable     Disposition:   to PACU, patient will remain inpatient for post-operative care               Juju Navarrete MD  12/21/23  22:06 EST

## 2023-12-22 NOTE — PROGRESS NOTES
[facility] Services Daily Progress Note    Patient Name: Charity Mcclure  : 1960  MRN: [id number]  Primary Care Physician:  Meron Whittaker MD  Date of admission: 2023      Subjective      Chief Complaint: Abdominal Pain     Brief interim history: 63-year-old hypertension, hypothyroidism, hypertension, mood disorder, and right hip repair.  Patient was admitted on 2023  presented to [facility] ED, complaining of abdominal pain with subjective fever chills and malaise.  Patient initially thought she might have had a flu.  However, she continues to feel bad and developed nonbloody diarrhea, and poor oral intake.  Was initially seen at [facility] ED and CT of the abdomen/pelvis at that facility revealed perforated diverticulum with tubo-ovarian mass, concerning for possible abscess.  Laboratories notable for leukocytosis with WBC of 21 K, CT of the abdomen/pelvis with contrast (), showed a left anterior pelvis 6.5 x 6.2 cm irregular rim enhancing fluid collection/cystic mass with surrounding inflammatory changes, and imaging features suggest infection such as tubal ovarian abscess or previous bout of perforated diverticulitis with resultant abscess formation.   She is admitted with principal diagnosis of suspected diverticulitis/tubo-ovarian abscess.  She was seen in consultation by OB/GYN and general surgery with recommendations, and plan for surgical intervention tentatively scheduled for tomorrow.    2023: Seen and examined in follow-up.  Complain of mild lower abdominal quadrant discomfort, otherwise, no events reported overnight.    2023: Seen and examined in follow-up.  No complaints or event overnight and plan for surgical intervention scheduled for today.    2023: Seen and examined in follow-up.  Patient is status post robotic Han colostomy with drainage of abscess and left salpingo-oophorectomy on 2023, and PCA pump for  pain control.    Vitals:   Temp:  [97.3 °F (36.3 °C)-100.5 °F (38.1 °C)] 97.3 °F (36.3 °C)  Heart Rate:  [77-90] 80  Resp:  [9-19] 19  BP: (104-149)/(52-90) 117/72  Flow (L/min):  [2-4] 2    Physical Exam  Constitutional:       General: She is not in acute distress.     Appearance: She is obese. She is not ill-appearing.   HENT:      Head: Normocephalic.      Right Ear: Tympanic membrane normal.      Nose: Nose normal.   Eyes:      Pupils: Pupils are equal, round, and reactive to light.   Cardiovascular:      Rate and Rhythm: Normal rate.      Pulses: Normal pulses.   Pulmonary:      Effort: Pulmonary effort is normal.   Abdominal:      Palpations: Abdomen is soft.   Musculoskeletal:         General: Normal range of motion.      Cervical back: Neck supple.   Skin:     General: Skin is warm.   Neurological:      General: No focal deficit present.      Mental Status: She is alert.             Result Review    Result Review:  I have personally reviewed the results from the time of this admission to 12/22/2023 13:35 EST and agree with these findings:  []  Laboratory  []  Microbiology  []  Radiology  []  EKG/Telemetry   []  Cardiology/Vascular   []  Pathology  []  Old records  []  Other:  Most notable findings include:     Wounds (last 24 hours)       LDA Wound       Row Name 12/22/23 1048 12/22/23 0400 12/22/23 0216       Wound 12/16/23 Right anterior greater trochanter Incision    Wound - Properties Group Placement Date: 12/16/23  -AG Present on Original Admission: N  -AG Side: Right  -AG Orientation: anterior  -AG Location: greater trochanter  -AG Primary Wound Type: Incision  -AG Wound Outcome: Other  -AG, healing     Dressing Appearance dry;intact;open to air  -DV dry;intact;open to air  -SH dry;intact;open to air  -SH    Drainage Amount none  -DV -- none  -SH    Dressing Care -- -- open to air  -SH    Retired Wound - Properties Group Placement Date: 12/16/23  -AG Present on Original Admission: N  -AG Side: Right   -AG Orientation: anterior  -AG Location: greater trochanter  -AG Primary Wound Type: Incision  -AG Wound Outcome: Other  -AG, healing     Retired Wound - Properties Group Date first assessed: 12/16/23  -AG Present on Original Admission: N  -AG Side: Right  -AG Location: greater trochanter  -AG Primary Wound Type: Incision  -AG Wound Outcome: Other  -AG, healing        Wound 12/21/23 1844 abdomen Incision    Wound - Properties Group Placement Date: 12/21/23  -MT Placement Time: 1844 -MT Location: abdomen  -MT Primary Wound Type: Incision  -MT, x5     Dressing Appearance dry;intact;no drainage  -DV dry;intact;no drainage  -SH dry;intact;no drainage  -SH    Closure Unable to assess  -DV Unable to assess  -SH Unable to assess  dressing in place  -SH    Base dressing in place, unable to visualize  -DV -- --    Drainage Amount none  -DV -- --    Retired Wound - Properties Group Placement Date: 12/21/23  -MT Placement Time: 1844 -MT Location: abdomen  -MT Primary Wound Type: Incision  -MT, x5     Retired Wound - Properties Group Date first assessed: 12/21/23  -MT Time first assessed: 1844 -MT Location: abdomen  -MT Primary Wound Type: Incision  -MT, x5        Wound 12/21/23 2224 lower abdomen Incision    Wound - Properties Group Placement Date: 12/21/23  -HB Placement Time: 2224 -HB Orientation: lower  -HB Location: abdomen  -HB Primary Wound Type: Incision  -HB    Dressing Appearance dry;intact;no drainage  -DV dry;intact;no drainage  -SH dry;intact;no drainage  -SH    Closure Unable to assess  -DV Unable to assess  -SH Unable to assess  -SH    Base dressing in place, unable to visualize  -DV -- --    Drainage Amount none  -DV -- --    Retired Wound - Properties Group Placement Date: 12/21/23  -HB Placement Time: 2224 -HB Orientation: lower  -HB Location: abdomen  -HB Primary Wound Type: Incision  -HB    Retired Wound - Properties Group Date first assessed: 12/21/23  -HB Time first assessed: 2224 -HB Location:  abdomen  -HB Primary Wound Type: Incision  -HB      Row Name 12/22/23 0048 12/22/23 0045 12/22/23 0030       Wound 12/16/23 Right anterior greater trochanter Incision    Wound - Properties Group Placement Date: 12/16/23  -AG Present on Original Admission: N  -AG Side: Right  -AG Orientation: anterior  -AG Location: greater trochanter  -AG Primary Wound Type: Incision  -AG Wound Outcome: Other  -AG, healing     Retired Wound - Properties Group Placement Date: 12/16/23  -AG Present on Original Admission: N  -AG Side: Right  -AG Orientation: anterior  -AG Location: greater trochanter  -AG Primary Wound Type: Incision  -AG Wound Outcome: Other  -AG, healing     Retired Wound - Properties Group Date first assessed: 12/16/23  -AG Present on Original Admission: N  -AG Side: Right  -AG Location: greater trochanter  -AG Primary Wound Type: Incision  -AG Wound Outcome: Other  -AG, healing        Wound 12/21/23 1844 abdomen Incision    Wound - Properties Group Placement Date: 12/21/23  -MT Placement Time: 1844 -MT Location: abdomen  -MT Primary Wound Type: Incision  -MT, x5     Dressing Appearance dry;intact;no drainage  -KS dry;intact;no drainage  -KS dry;intact;no drainage  -KS    Closure Unable to assess  -KS Unable to assess  -KS Unable to assess  -KS    Retired Wound - Properties Group Placement Date: 12/21/23  -MT Placement Time: 1844 -MT Location: abdomen  -MT Primary Wound Type: Incision  -MT, x5     Retired Wound - Properties Group Date first assessed: 12/21/23  -MT Time first assessed: 1844 -MT Location: abdomen  -MT Primary Wound Type: Incision  -MT, x5        Wound 12/21/23 2224 lower abdomen Incision    Wound - Properties Group Placement Date: 12/21/23  -HB Placement Time: 2224 -HB Orientation: lower  -HB Location: abdomen  -HB Primary Wound Type: Incision  -HB    Dressing Appearance dry;intact;no drainage  -KS dry;intact;no drainage  -KS dry;intact;no drainage  -KS    Closure Unable to assess  -KS Unable to  assess  -KS Unable to assess  -KS    Retired Wound - Properties Group Placement Date: 12/21/23  -HB Placement Time: 2224 -HB Orientation: lower  -HB Location: abdomen  -HB Primary Wound Type: Incision  -HB    Retired Wound - Properties Group Date first assessed: 12/21/23  -HB Time first assessed: 2224 -HB Location: abdomen  -HB Primary Wound Type: Incision  -HB      Row Name 12/22/23 0015 12/22/23 0000 12/21/23 2344       Wound 12/16/23 Right anterior greater trochanter Incision    Wound - Properties Group Placement Date: 12/16/23  -AG Present on Original Admission: N  -AG Side: Right  -AG Orientation: anterior  -AG Location: greater trochanter  -AG Primary Wound Type: Incision  -AG Wound Outcome: Other  -AG, healing     Retired Wound - Properties Group Placement Date: 12/16/23  -AG Present on Original Admission: N  -AG Side: Right  -AG Orientation: anterior  -AG Location: greater trochanter  -AG Primary Wound Type: Incision  -AG Wound Outcome: Other  -AG, healing     Retired Wound - Properties Group Date first assessed: 12/16/23  -AG Present on Original Admission: N  -AG Side: Right  -AG Location: greater trochanter  -AG Primary Wound Type: Incision  -AG Wound Outcome: Other  -AG, healing        Wound 12/21/23 1844 abdomen Incision    Wound - Properties Group Placement Date: 12/21/23  -MT Placement Time: 1844 -MT Location: abdomen  -MT Primary Wound Type: Incision  -MT, x5     Dressing Appearance dry;intact;no drainage  -KS dry;intact;no drainage  -KS --    Closure Unable to assess  -KS Unable to assess  -KS --    Dressing Care -- -- dressing applied  staples, covaderm x 5  -HB    Retired Wound - Properties Group Placement Date: 12/21/23  -MT Placement Time: 1844 -MT Location: abdomen  -MT Primary Wound Type: Incision  -MT, x5     Retired Wound - Properties Group Date first assessed: 12/21/23  -MT Time first assessed: 1844 -MT Location: abdomen  -MT Primary Wound Type: Incision  -MT, x5        Wound 12/21/23  2224 lower abdomen Incision    Wound - Properties Group Placement Date: 12/21/23  -HB Placement Time: 2224 -HB Orientation: lower  -HB Location: abdomen  -HB Primary Wound Type: Incision  -HB    Dressing Appearance dry;intact;no drainage  -KS dry;intact;no drainage  -KS --    Closure Unable to assess  -KS Unable to assess  -KS --    Retired Wound - Properties Group Placement Date: 12/21/23  -HB Placement Time: 2224 -HB Orientation: lower  -HB Location: abdomen  -HB Primary Wound Type: Incision  -HB    Retired Wound - Properties Group Date first assessed: 12/21/23  -HB Time first assessed: 2224 -HB Location: abdomen  -HB Primary Wound Type: Incision  -HB      Row Name 12/21/23 2324             Wound 12/16/23 Right anterior greater trochanter Incision    Wound - Properties Group Placement Date: 12/16/23  -AG Present on Original Admission: N  -AG Side: Right  -AG Orientation: anterior  -AG Location: greater trochanter  -AG Primary Wound Type: Incision  -AG Wound Outcome: Other  -AG, healing     Retired Wound - Properties Group Placement Date: 12/16/23  -AG Present on Original Admission: N  -AG Side: Right  -AG Orientation: anterior  -AG Location: greater trochanter  -AG Primary Wound Type: Incision  -AG Wound Outcome: Other  -AG, healing     Retired Wound - Properties Group Date first assessed: 12/16/23  -AG Present on Original Admission: N  -AG Side: Right  -AG Location: greater trochanter  -AG Primary Wound Type: Incision  -AG Wound Outcome: Other  -AG, healing        Wound 12/21/23 1844 abdomen Incision    Wound - Properties Group Placement Date: 12/21/23  -MT Placement Time: 1844 -MT Location: abdomen  -MT Primary Wound Type: Incision  -MT, x5     Retired Wound - Properties Group Placement Date: 12/21/23  -MT Placement Time: 1844 -MT Location: abdomen  -MT Primary Wound Type: Incision  -MT, x5     Retired Wound - Properties Group Date first assessed: 12/21/23  -MT Time first assessed: 1844 -MT Location:  abdomen  -MT Primary Wound Type: Incision  -MT, x5        Wound 12/21/23 2224 lower abdomen Incision    Wound - Properties Group Placement Date: 12/21/23  -HB Placement Time: 2224 -HB Orientation: lower  -HB Location: abdomen  -HB Primary Wound Type: Incision  -HB    Dressing Care dressing applied  staples, covaderm  -HB      Retired Wound - Properties Group Placement Date: 12/21/23  -HB Placement Time: 2224 -HB Orientation: lower  -HB Location: abdomen  -HB Primary Wound Type: Incision  -HB    Retired Wound - Properties Group Date first assessed: 12/21/23  -HB Time first assessed: 2224 -HB Location: abdomen  -HB Primary Wound Type: Incision  -HB              User Key  (r) = Recorded By, (t) = Taken By, (c) = Cosigned By      Initials Name Provider Type    Courtney Laguerre RN Registered Nurse    Ewelina Keller RN Registered Nurse    Amrik Donato RN Registered Nurse    Amelia Blackwood LPN Licensed Nurse    Belinda Lugo RN Registered Nurse     Marivel aMrtínez LPN Licensed Nurse                      Assessment & Plan        acetaminophen, 1,000 mg, Oral, Q6H  cefTRIAXone, 2,000 mg, Intravenous, Daily  doxycycline, 100 mg, Intravenous, Q12H  escitalopram, 20 mg, Oral, Daily  levothyroxine, 25 mcg, Oral, Q AM  lisinopril, 10 mg, Oral, Daily  metroNIDAZOLE, 500 mg, Intravenous, Q8H  pantoprazole, 40 mg, Oral, Q AM  senna-docusate sodium, 2 tablet, Oral, BID  sodium chloride, 10 mL, Intravenous, Q12H       HYDROmorphone HCl-NaCl,   lactated ringers, 100 mL/hr, Last Rate: 100 mL/hr (12/22/23 0200)  Pharmacy Consult,   sodium chloride, 30 mL/hr  sodium chloride, 30 mL/hr, Last Rate: 30 mL/hr (12/22/23 1025)         Active Hospital Problems:  Active Hospital Problems    Diagnosis     **Perforated diverticulum     Mood disorder     Hypertension     Disease of thyroid gland     Tubo-ovarian mass        Assessment/plan:    Diverticulitis/tubo-ovarian abscess      -supportive care, pain control,  Rocephin/doxycycline/Flagyl       -urine culture showed no growth        - S/p robotic cory colostomy with drainage of abscess and left salpingo-oophorectomy (12/21/2023), on  PCA pump for pain control    Hypertension     -Lisinopril    Hypothyroidism     -Synthroid    Depression      -Lexapro    DVT prophylaxis:  Mechanical DVT prophylaxis orders are present.    CODE STATUS:    Level Of Support Discussed With: Patient  Code Status (Patient has no pulse and is not breathing): CPR (Attempt to Resuscitate)  Medical Interventions (Patient has pulse or is breathing): Full      Disposition:  I expect patient to be discharged 4 to 5 days.    This patient has been  and discussed with . 12/22/23      Electronically signed by Dg Payne MD, 12/22/23, 13:35 EST.  [facility] Hospitalist Team

## 2023-12-22 NOTE — BRIEF OP NOTE
TOTAL LAPAROSCOPIC HYSTERECTOMY BILATERAL SALPINGOOPHORECTOMY, NODE DISSECTION WITH DAVINCI ROBOT  Progress Note    Charity Mcclure  12/21/2023    Pre-op Diagnosis:   Pelvic abscess        Post-Op Diagnosis Codes:   Pelvic abscess involving sigmoid mesentery and tuboovarian     Procedure/CPT® Codes:        Procedure(s):  left SALPINGOOPHORECTOMY,  cysto WITH DAVINCI ROBOT, sigmoid resection, drainage of abcess, creation of colostomy              Surgeon(s):  Harvinder Vasquez MD Almaz, Biruk, MD Coates, Tawana, MD Baldwin, Stephen M, MD Baldwin, Stephen M, MD    Anesthesia: General    Staff:   Circulator: Courtney Graham, NIRAV; Lizzeth Bautista RN; Raeann Soto RN; Amrik Kamara RN  Scrub Person: Ila Schmidt RN; Stacey Ramos; Lorena Seo         Estimated Blood Loss: 200ml    Urine Voided: * No values recorded between 12/21/2023  6:10 PM and 12/21/2023 11:51 PM *    Specimens:                          Drains:   Closed/Suction Drain Medial LLQ Bulb 19 Fr. (Active)   Dressing Status Clean;Dry;Intact 12/21/23 2346       Colostomy (Active)       Urethral Catheter Non-latex 16 Fr. (Active)       Findings: large abscess cavity with jcarlos purulent collection, friable mesentery         Complications: None           Harvinder Vasquez MD     Date: 12/22/2023  Time: 00:01 EST

## 2023-12-22 NOTE — PLAN OF CARE
Goal Outcome Evaluation:  Plan of Care Reviewed With: patient, spouse        Progress: no change

## 2023-12-22 NOTE — PLAN OF CARE
Goal Outcome Evaluation:      Pt still sedated after surgery, arouses easily to voice. Antibiotics hung. VSS, call light within reach, plan of care ongoing.

## 2023-12-22 NOTE — PROGRESS NOTES
Colorectal Surgery Progress Note    Pt. Name/Age/:  Charity Mcclure   63 y.o.    1960         Med. Record Number:   [id number]  Date of admission:  2023      Service(s): Colorectal Surgery      Subjective    Doing ok, some pain   Denies nausea  Minimal drainage from INGRID, SS  No ostomy output  Afebrile and normotensive     Reactive leukocytosis this am     Objective  Vitals:     Patient Vitals for the past 24 hrs:   BP Temp Temp src Pulse Resp SpO2   23 1125 117/72 97.3 °F (36.3 °C) Oral 80 19 --   23 0732 104/63 97.8 °F (36.6 °C) Oral 85 17 95 %   23 0410 149/90 97.4 °F (36.3 °C) Oral -- 16 --   23 0102 134/81 97.4 °F (36.3 °C) Oral 84 16 96 %   23 0048 127/69 98.1 °F (36.7 °C) Temporal 84 10 95 %   23 0045 117/70 -- -- 83 15 95 %   23 0030 124/71 -- -- 81 9 95 %   23 0015 134/77 -- -- 86 14 95 %   23 0010 136/82 -- -- 89 17 94 %   23 0005 121/61 -- -- 88 15 93 %   23 0000 118/52 100.5 °F (38.1 °C) Temporal 90 17 93 %   23 1627 127/73 98.7 °F (37.1 °C) Oral 77 -- 96 %   23 1334 133/75 -- -- -- -- --   23 1158 -- 98.9 °F (37.2 °C) Oral -- 18 96 %           Wt. Admission: Weight: 70.2 kg (154 lb 12.2 oz)     Wt. Current: Weight: 70.2 kg (154 lb 12.2 oz)   Body mass index is 29.24 kg/m².      I&O:  Intake/Output Summary (Last 24 hours) at 2023 1141  Last data filed at 2023 0410  Gross per 24 hour   Intake 2000 ml   Output 1385 ml   Net 615 ml     1901 -  0700  In: 3634 [P.O.:480; I.V.:3154]  Out: 1385 [Urine:1200; Drains:185]      Exam  General:  No acute distress, resting comfortably.  Cardiovascular: regular rate.  Respiratory: no increased work of breathing.  Abdomen:  Soft, appropriate incisional tenderness, non-distended. No diffuse guarding or rebound tenderness. Incisions c/d/I.Drain in place with SS, ostomy pink with no output   Extremities: warm, well-perfused extremities, no pitting  edema.         Scheduled Meds:acetaminophen, 1,000 mg, Oral, Q6H  cefTRIAXone, 2,000 mg, Intravenous, Daily  doxycycline, 100 mg, Intravenous, Q12H  escitalopram, 20 mg, Oral, Daily  levothyroxine, 25 mcg, Oral, Q AM  lisinopril, 10 mg, Oral, Daily  metroNIDAZOLE, 500 mg, Intravenous, Q8H  pantoprazole, 40 mg, Oral, Q AM  senna-docusate sodium, 2 tablet, Oral, BID  sodium chloride, 10 mL, Intravenous, Q12H      Continuous Infusions:HYDROmorphone HCl-NaCl,   lactated ringers, 100 mL/hr, Last Rate: 100 mL/hr (12/22/23 0200)  Pharmacy Consult,   sodium chloride, 30 mL/hr  sodium chloride, 30 mL/hr, Last Rate: 30 mL/hr (12/22/23 1025)      PRN Meds:.  acetaminophen    senna-docusate sodium **AND** polyethylene glycol **AND** bisacodyl **AND** bisacodyl    Calcium Replacement - Follow Nurse / BPA Driven Protocol    Magnesium Standard Dose Replacement - Follow Nurse / BPA Driven Protocol    naloxone    naloxone    nitroglycerin    ondansetron **OR** ondansetron    ondansetron    oxyCODONE    Pharmacy Consult    Phosphorus Replacement - Follow Nurse / BPA Driven Protocol    Potassium Replacement - Follow Nurse / BPA Driven Protocol    sodium chloride    sodium chloride    sodium chloride          Assessment  63 y.o. female s/p Robotic johnson colostomy, drainage of abscess, and left salpingo oophorectomy     Plan / Recommendations    - Liquid diet as tolerated   - out of bed and ambulate   - minimize narcotics and pain meds  - antiemetics PRN   - Incentive spirometry   - keep madison for another day   - keep INGRID drain  - ostomy teaching and care   - continue IV antibiotics       11:41 EST; 12/22/2023        Harvinder Vasquez MD  Colon and Rectal Surgery   [facility]

## 2023-12-23 LAB
ALBUMIN SERPL-MCNC: 2.3 G/DL (ref 3.5–5.2)
ALBUMIN/GLOB SERPL: 0.8 G/DL
ALP SERPL-CCNC: 64 U/L (ref 39–117)
ALT SERPL W P-5'-P-CCNC: 7 U/L (ref 1–33)
ANION GAP SERPL CALCULATED.3IONS-SCNC: 5 MMOL/L (ref 5–15)
AST SERPL-CCNC: 11 U/L (ref 1–32)
BASOPHILS # BLD AUTO: 0 10*3/MM3 (ref 0–0.2)
BASOPHILS NFR BLD AUTO: 0.2 % (ref 0–1.5)
BILIRUB SERPL-MCNC: 0.2 MG/DL (ref 0–1.2)
BUN SERPL-MCNC: 7 MG/DL (ref 8–23)
BUN/CREAT SERPL: 14.3 (ref 7–25)
CALCIUM SPEC-SCNC: 7.9 MG/DL (ref 8.6–10.5)
CHLORIDE SERPL-SCNC: 101 MMOL/L (ref 98–107)
CO2 SERPL-SCNC: 28 MMOL/L (ref 22–29)
CREAT SERPL-MCNC: 0.49 MG/DL (ref 0.57–1)
DEPRECATED RDW RBC AUTO: 51.2 FL (ref 37–54)
EGFRCR SERPLBLD CKD-EPI 2021: 106.1 ML/MIN/1.73
EOSINOPHIL # BLD AUTO: 0 10*3/MM3 (ref 0–0.4)
EOSINOPHIL NFR BLD AUTO: 0.1 % (ref 0.3–6.2)
ERYTHROCYTE [DISTWIDTH] IN BLOOD BY AUTOMATED COUNT: 16 % (ref 12.3–15.4)
GLOBULIN UR ELPH-MCNC: 2.9 GM/DL
GLUCOSE SERPL-MCNC: 115 MG/DL (ref 65–99)
HCT VFR BLD AUTO: 24.6 % (ref 34–46.6)
HGB BLD-MCNC: 7.9 G/DL (ref 12–15.9)
LYMPHOCYTES # BLD AUTO: 1.4 10*3/MM3 (ref 0.7–3.1)
LYMPHOCYTES NFR BLD AUTO: 7.2 % (ref 19.6–45.3)
MCH RBC QN AUTO: 29.3 PG (ref 26.6–33)
MCHC RBC AUTO-ENTMCNC: 32.1 G/DL (ref 31.5–35.7)
MCV RBC AUTO: 91.2 FL (ref 79–97)
MONOCYTES # BLD AUTO: 1.1 10*3/MM3 (ref 0.1–0.9)
MONOCYTES NFR BLD AUTO: 5.3 % (ref 5–12)
NEUTROPHILS NFR BLD AUTO: 17.4 10*3/MM3 (ref 1.7–7)
NEUTROPHILS NFR BLD AUTO: 87.2 % (ref 42.7–76)
NRBC BLD AUTO-RTO: 0 /100 WBC (ref 0–0.2)
PLATELET # BLD AUTO: 379 10*3/MM3 (ref 140–450)
PMV BLD AUTO: 7.2 FL (ref 6–12)
POTASSIUM SERPL-SCNC: 3.7 MMOL/L (ref 3.5–5.2)
PROT SERPL-MCNC: 5.2 G/DL (ref 6–8.5)
RBC # BLD AUTO: 2.7 10*6/MM3 (ref 3.77–5.28)
SODIUM SERPL-SCNC: 134 MMOL/L (ref 136–145)
WBC NRBC COR # BLD AUTO: 19.9 10*3/MM3 (ref 3.4–10.8)

## 2023-12-23 PROCEDURE — 80053 COMPREHEN METABOLIC PANEL: CPT | Performed by: STUDENT IN AN ORGANIZED HEALTH CARE EDUCATION/TRAINING PROGRAM

## 2023-12-23 PROCEDURE — 99024 POSTOP FOLLOW-UP VISIT: CPT | Performed by: STUDENT IN AN ORGANIZED HEALTH CARE EDUCATION/TRAINING PROGRAM

## 2023-12-23 PROCEDURE — 25010000002 METRONIDAZOLE 500 MG/100ML SOLUTION: Performed by: STUDENT IN AN ORGANIZED HEALTH CARE EDUCATION/TRAINING PROGRAM

## 2023-12-23 PROCEDURE — 25010000002 HYDROMORPHONE 1 MG/ML SOLUTION: Performed by: INTERNAL MEDICINE

## 2023-12-23 PROCEDURE — 25010000002 CEFTRIAXONE PER 250 MG: Performed by: STUDENT IN AN ORGANIZED HEALTH CARE EDUCATION/TRAINING PROGRAM

## 2023-12-23 PROCEDURE — 85025 COMPLETE CBC W/AUTO DIFF WBC: CPT | Performed by: INTERNAL MEDICINE

## 2023-12-23 RX ORDER — KETOROLAC TROMETHAMINE 10 MG/1
10 TABLET, FILM COATED ORAL EVERY 6 HOURS PRN
Status: DISCONTINUED | OUTPATIENT
Start: 2023-12-23 | End: 2023-12-26 | Stop reason: HOSPADM

## 2023-12-23 RX ORDER — METHOCARBAMOL 750 MG/1
750 TABLET, FILM COATED ORAL 4 TIMES DAILY
Status: DISCONTINUED | OUTPATIENT
Start: 2023-12-23 | End: 2023-12-26 | Stop reason: HOSPADM

## 2023-12-23 RX ORDER — OXYCODONE HYDROCHLORIDE 5 MG/1
10 TABLET ORAL EVERY 4 HOURS PRN
Status: DISCONTINUED | OUTPATIENT
Start: 2023-12-23 | End: 2023-12-26 | Stop reason: HOSPADM

## 2023-12-23 RX ADMIN — METRONIDAZOLE 500 MG: 500 INJECTION, SOLUTION INTRAVENOUS at 01:32

## 2023-12-23 RX ADMIN — DOXYCYCLINE 100 MG: 100 INJECTION, POWDER, LYOPHILIZED, FOR SOLUTION INTRAVENOUS at 14:34

## 2023-12-23 RX ADMIN — METHOCARBAMOL TABLETS 750 MG: 750 TABLET, COATED ORAL at 18:18

## 2023-12-23 RX ADMIN — Medication 10 ML: at 09:21

## 2023-12-23 RX ADMIN — HYDROMORPHONE HYDROCHLORIDE 1 MG: 1 INJECTION, SOLUTION INTRAMUSCULAR; INTRAVENOUS; SUBCUTANEOUS at 14:34

## 2023-12-23 RX ADMIN — ACETAMINOPHEN 1000 MG: 500 TABLET ORAL at 20:17

## 2023-12-23 RX ADMIN — PANTOPRAZOLE SODIUM 40 MG: 40 TABLET, DELAYED RELEASE ORAL at 05:42

## 2023-12-23 RX ADMIN — ESCITALOPRAM OXALATE 20 MG: 10 TABLET ORAL at 09:19

## 2023-12-23 RX ADMIN — HYDROMORPHONE HYDROCHLORIDE 1 MG: 1 INJECTION, SOLUTION INTRAMUSCULAR; INTRAVENOUS; SUBCUTANEOUS at 10:17

## 2023-12-23 RX ADMIN — METHOCARBAMOL TABLETS 750 MG: 750 TABLET, COATED ORAL at 12:46

## 2023-12-23 RX ADMIN — CEFTRIAXONE 2000 MG: 2 INJECTION, POWDER, FOR SOLUTION INTRAMUSCULAR; INTRAVENOUS at 20:19

## 2023-12-23 RX ADMIN — SENNOSIDES AND DOCUSATE SODIUM 2 TABLET: 8.6; 5 TABLET ORAL at 20:17

## 2023-12-23 RX ADMIN — ACETAMINOPHEN 1000 MG: 500 TABLET ORAL at 09:20

## 2023-12-23 RX ADMIN — SENNOSIDES AND DOCUSATE SODIUM 2 TABLET: 8.6; 5 TABLET ORAL at 09:45

## 2023-12-23 RX ADMIN — METRONIDAZOLE 500 MG: 500 INJECTION, SOLUTION INTRAVENOUS at 18:18

## 2023-12-23 RX ADMIN — ACETAMINOPHEN 1000 MG: 500 TABLET ORAL at 01:32

## 2023-12-23 RX ADMIN — LEVOTHYROXINE SODIUM 25 MCG: 25 TABLET ORAL at 05:42

## 2023-12-23 RX ADMIN — OXYCODONE HYDROCHLORIDE 10 MG: 5 TABLET ORAL at 18:18

## 2023-12-23 RX ADMIN — ACETAMINOPHEN 1000 MG: 500 TABLET ORAL at 14:34

## 2023-12-23 RX ADMIN — OXYCODONE HYDROCHLORIDE 10 MG: 5 TABLET ORAL at 12:46

## 2023-12-23 RX ADMIN — HYDROMORPHONE HYDROCHLORIDE 1 MG: 1 INJECTION, SOLUTION INTRAMUSCULAR; INTRAVENOUS; SUBCUTANEOUS at 20:17

## 2023-12-23 RX ADMIN — METRONIDAZOLE 500 MG: 500 INJECTION, SOLUTION INTRAVENOUS at 09:20

## 2023-12-23 RX ADMIN — DOXYCYCLINE 100 MG: 100 INJECTION, POWDER, LYOPHILIZED, FOR SOLUTION INTRAVENOUS at 03:35

## 2023-12-23 RX ADMIN — METHOCARBAMOL TABLETS 750 MG: 750 TABLET, COATED ORAL at 20:17

## 2023-12-23 RX ADMIN — LISINOPRIL 10 MG: 5 TABLET ORAL at 09:20

## 2023-12-23 NOTE — PROGRESS NOTES
Colorectal Surgery Progress Note    Pt. Name/Age/:  Charity Mcclure   63 y.o.    1960         Med. Record Number:   [id number]  Date of admission:  2023      Service(s): Colorectal Surgery      Subjective    Doing well.  Pain well controlled  No nausea and tolerating CLD  Has been out of bed   Yates removed this am   Drain SS  No ostomy output yet     Objective  Vitals:     Patient Vitals for the past 24 hrs:   BP Temp Temp src Pulse Resp SpO2   23 0837 113/71 97.7 °F (36.5 °C) Oral 77 13 --   23 0519 -- 98 °F (36.7 °C) Oral -- 15 --   23 0045 115/69 98.2 °F (36.8 °C) Oral -- 20 --   23 -- 98.1 °F (36.7 °C) Oral -- 15 --   23 1651 106/69 98 °F (36.7 °C) Oral 77 13 96 %   23 1125 117/72 97.3 °F (36.3 °C) Oral 80 19 --           Wt. Admission: Weight: 70.2 kg (154 lb 12.2 oz)     Wt. Current: Weight: 70.2 kg (154 lb 12.2 oz)   Body mass index is 29.24 kg/m².      I&O:  Intake/Output Summary (Last 24 hours) at 2023 0848  Last data filed at 2023 0519  Gross per 24 hour   Intake 1966 ml   Output 1620 ml   Net 346 ml     1901 -  0700  In: 3966 [P.O.:600; I.V.:3366]  Out: 3005 [Urine:2700; Drains:305]      Exam  General:  No acute distress, resting comfortably.  Cardiovascular: regular rate.  Respiratory: no increased work of breathing.  Abdomen:  Soft, appropriate incisional tenderness, non-distended. No diffuse guarding or rebound tenderness. Incisions c/d/I.drain with SS, Ostomy pink, patent and viable   Extremities: warm, well-perfused extremities, no pitting edema.            Scheduled Meds:acetaminophen, 1,000 mg, Oral, Q6H  cefTRIAXone, 2,000 mg, Intravenous, Daily  doxycycline, 100 mg, Intravenous, Q12H  escitalopram, 20 mg, Oral, Daily  levothyroxine, 25 mcg, Oral, Q AM  lisinopril, 10 mg, Oral, Daily  methocarbamol, 750 mg, Oral, 4x Daily  metroNIDAZOLE, 500 mg, Intravenous, Q8H  pantoprazole, 40 mg, Oral, Q AM  senna-docusate sodium,  "2 tablet, Oral, BID  sodium chloride, 10 mL, Intravenous, Q12H      Continuous Infusions:Pharmacy Consult,   sodium chloride, 30 mL/hr  sodium chloride, 30 mL/hr, Last Rate: 30 mL/hr (12/22/23 1025)      PRN Meds:.  acetaminophen    senna-docusate sodium **AND** polyethylene glycol **AND** bisacodyl **AND** [DISCONTINUED] bisacodyl    Calcium Replacement - Follow Nurse / BPA Driven Protocol    ketorolac    Magnesium Standard Dose Replacement - Follow Nurse / BPA Driven Protocol    naloxone    naloxone    nitroglycerin    ondansetron **OR** ondansetron    ondansetron    oxyCODONE    Pharmacy Consult    Phosphorus Replacement - Follow Nurse / BPA Driven Protocol    Potassium Replacement - Follow Nurse / BPA Driven Protocol    sodium chloride    sodium chloride    sodium chloride          Assessment    63 y.o. female s/p Robotic johnson colostomy, drainage of abscess, and left salpingo oophorectomy      Plan / Recommendations  - Ok for regular diet as tolerated   - continue out of med and ambulating   - DC PCA, minimize narcotics. I have added multimodal pian meds (robaxin, toradol. Tylenol, and oxy). Can add lidocaine patch as needed    - DC IVF  - Keep drain for now  - void trial.   - Continue antibiotics for four day post source control per \"stop it trial \"        08:48 EST; 12/23/2023        Harvinder Vasquez MD  Colon and Rectal Surgery   [facility]      "

## 2023-12-23 NOTE — PLAN OF CARE
Goal Outcome Evaluation:  Plan of Care Reviewed With: patient        Progress: improving          Talkative and pleasant with pain controlled with PCA Dilaudid. Tolerating po clears without nausea. Ostomy without flatus or stool. INGRID with minimal serous output. Plan of care on going.

## 2023-12-23 NOTE — PROGRESS NOTES
GYN on call:   Saw pt today, is sitting up in chair, states pain is better controlled at this time and felt very tired this morning but now more awake and has voided spontaneously, no real flatus yet, does feel some gas and is burping.    Bps 90-110s/60-70s, HR 70-80s, Tmax 98.2  NAD  Abd soft, distended, INGRID drain with serosang drainage.   WBC 19K (26 yesterday), Hgb 7.9 (9.4 yesterday)  POD 2 s/p robotic LSO and colostomy with abscess drainage.  Appreciate care per Dr. Payne   At this time there is nothing to add from a Gyn standpoint so I will sign off but am available if any needs arise.    Please call Labor and delivery at [phone] to get the Gyn MD on call and their cell number if any further issues during this hospitalization.      Thank you!

## 2023-12-24 LAB
ALBUMIN SERPL-MCNC: 2.3 G/DL (ref 3.5–5.2)
ALBUMIN SERPL-MCNC: 2.4 G/DL (ref 3.5–5.2)
ALBUMIN/GLOB SERPL: 0.8 G/DL
ALBUMIN/GLOB SERPL: 0.8 G/DL
ALP SERPL-CCNC: 69 U/L (ref 39–117)
ALP SERPL-CCNC: 82 U/L (ref 39–117)
ALT SERPL W P-5'-P-CCNC: 10 U/L (ref 1–33)
ALT SERPL W P-5'-P-CCNC: 12 U/L (ref 1–33)
ANION GAP SERPL CALCULATED.3IONS-SCNC: 11 MMOL/L (ref 5–15)
ANION GAP SERPL CALCULATED.3IONS-SCNC: 11 MMOL/L (ref 5–15)
AST SERPL-CCNC: 19 U/L (ref 1–32)
AST SERPL-CCNC: 22 U/L (ref 1–32)
BASOPHILS # BLD AUTO: 0 10*3/MM3 (ref 0–0.2)
BASOPHILS # BLD AUTO: 0.1 10*3/MM3 (ref 0–0.2)
BASOPHILS NFR BLD AUTO: 0.3 % (ref 0–1.5)
BASOPHILS NFR BLD AUTO: 0.3 % (ref 0–1.5)
BILIRUB SERPL-MCNC: 0.2 MG/DL (ref 0–1.2)
BILIRUB SERPL-MCNC: 0.2 MG/DL (ref 0–1.2)
BUN SERPL-MCNC: 7 MG/DL (ref 8–23)
BUN SERPL-MCNC: 8 MG/DL (ref 8–23)
BUN/CREAT SERPL: 17.1 (ref 7–25)
BUN/CREAT SERPL: 17.4 (ref 7–25)
CALCIUM SPEC-SCNC: 8.1 MG/DL (ref 8.6–10.5)
CALCIUM SPEC-SCNC: 8.1 MG/DL (ref 8.6–10.5)
CHLORIDE SERPL-SCNC: 101 MMOL/L (ref 98–107)
CHLORIDE SERPL-SCNC: 102 MMOL/L (ref 98–107)
CO2 SERPL-SCNC: 25 MMOL/L (ref 22–29)
CO2 SERPL-SCNC: 25 MMOL/L (ref 22–29)
CREAT SERPL-MCNC: 0.41 MG/DL (ref 0.57–1)
CREAT SERPL-MCNC: 0.46 MG/DL (ref 0.57–1)
DEPRECATED RDW RBC AUTO: 49.4 FL (ref 37–54)
DEPRECATED RDW RBC AUTO: 52.9 FL (ref 37–54)
EGFRCR SERPLBLD CKD-EPI 2021: 107.7 ML/MIN/1.73
EGFRCR SERPLBLD CKD-EPI 2021: 110.7 ML/MIN/1.73
EOSINOPHIL # BLD AUTO: 0 10*3/MM3 (ref 0–0.4)
EOSINOPHIL # BLD AUTO: 0 10*3/MM3 (ref 0–0.4)
EOSINOPHIL NFR BLD AUTO: 0.2 % (ref 0.3–6.2)
EOSINOPHIL NFR BLD AUTO: 0.3 % (ref 0.3–6.2)
ERYTHROCYTE [DISTWIDTH] IN BLOOD BY AUTOMATED COUNT: 15.7 % (ref 12.3–15.4)
ERYTHROCYTE [DISTWIDTH] IN BLOOD BY AUTOMATED COUNT: 15.7 % (ref 12.3–15.4)
GLOBULIN UR ELPH-MCNC: 2.8 GM/DL
GLOBULIN UR ELPH-MCNC: 3.1 GM/DL
GLUCOSE SERPL-MCNC: 102 MG/DL (ref 65–99)
GLUCOSE SERPL-MCNC: 104 MG/DL (ref 65–99)
HCT VFR BLD AUTO: 23.4 % (ref 34–46.6)
HCT VFR BLD AUTO: 23.6 % (ref 34–46.6)
HGB BLD-MCNC: 7.6 G/DL (ref 12–15.9)
HGB BLD-MCNC: 7.7 G/DL (ref 12–15.9)
LYMPHOCYTES # BLD AUTO: 1.7 10*3/MM3 (ref 0.7–3.1)
LYMPHOCYTES # BLD AUTO: 2 10*3/MM3 (ref 0.7–3.1)
LYMPHOCYTES NFR BLD AUTO: 11.9 % (ref 19.6–45.3)
LYMPHOCYTES NFR BLD AUTO: 9.8 % (ref 19.6–45.3)
MAGNESIUM SERPL-MCNC: 1.7 MG/DL (ref 1.6–2.4)
MCH RBC QN AUTO: 29.3 PG (ref 26.6–33)
MCH RBC QN AUTO: 29.3 PG (ref 26.6–33)
MCHC RBC AUTO-ENTMCNC: 32.5 G/DL (ref 31.5–35.7)
MCHC RBC AUTO-ENTMCNC: 32.5 G/DL (ref 31.5–35.7)
MCV RBC AUTO: 90.2 FL (ref 79–97)
MCV RBC AUTO: 90.3 FL (ref 79–97)
MONOCYTES # BLD AUTO: 0.7 10*3/MM3 (ref 0.1–0.9)
MONOCYTES # BLD AUTO: 1 10*3/MM3 (ref 0.1–0.9)
MONOCYTES NFR BLD AUTO: 4.3 % (ref 5–12)
MONOCYTES NFR BLD AUTO: 6.1 % (ref 5–12)
NEUTROPHILS NFR BLD AUTO: 13.8 10*3/MM3 (ref 1.7–7)
NEUTROPHILS NFR BLD AUTO: 14.6 10*3/MM3 (ref 1.7–7)
NEUTROPHILS NFR BLD AUTO: 81.4 % (ref 42.7–76)
NEUTROPHILS NFR BLD AUTO: 85.4 % (ref 42.7–76)
NRBC BLD AUTO-RTO: 0 /100 WBC (ref 0–0.2)
NRBC BLD AUTO-RTO: 0.2 /100 WBC (ref 0–0.2)
PHOSPHATE SERPL-MCNC: 2.4 MG/DL (ref 2.5–4.5)
PLATELET # BLD AUTO: 364 10*3/MM3 (ref 140–450)
PLATELET # BLD AUTO: 425 10*3/MM3 (ref 140–450)
PMV BLD AUTO: 7.1 FL (ref 6–12)
PMV BLD AUTO: 7.6 FL (ref 6–12)
POTASSIUM SERPL-SCNC: 3.4 MMOL/L (ref 3.5–5.2)
POTASSIUM SERPL-SCNC: 3.7 MMOL/L (ref 3.5–5.2)
PROT SERPL-MCNC: 5.1 G/DL (ref 6–8.5)
PROT SERPL-MCNC: 5.5 G/DL (ref 6–8.5)
RBC # BLD AUTO: 2.6 10*6/MM3 (ref 3.77–5.28)
RBC # BLD AUTO: 2.62 10*6/MM3 (ref 3.77–5.28)
SODIUM SERPL-SCNC: 137 MMOL/L (ref 136–145)
SODIUM SERPL-SCNC: 138 MMOL/L (ref 136–145)
WBC NRBC COR # BLD AUTO: 16.9 10*3/MM3 (ref 3.4–10.8)
WBC NRBC COR # BLD AUTO: 17.1 10*3/MM3 (ref 3.4–10.8)

## 2023-12-24 PROCEDURE — 99024 POSTOP FOLLOW-UP VISIT: CPT | Performed by: STUDENT IN AN ORGANIZED HEALTH CARE EDUCATION/TRAINING PROGRAM

## 2023-12-24 PROCEDURE — 83735 ASSAY OF MAGNESIUM: CPT | Performed by: INTERNAL MEDICINE

## 2023-12-24 PROCEDURE — 25010000002 ONDANSETRON PER 1 MG: Performed by: STUDENT IN AN ORGANIZED HEALTH CARE EDUCATION/TRAINING PROGRAM

## 2023-12-24 PROCEDURE — 25010000002 CEFTRIAXONE PER 250 MG: Performed by: STUDENT IN AN ORGANIZED HEALTH CARE EDUCATION/TRAINING PROGRAM

## 2023-12-24 PROCEDURE — 25010000002 METRONIDAZOLE 500 MG/100ML SOLUTION: Performed by: STUDENT IN AN ORGANIZED HEALTH CARE EDUCATION/TRAINING PROGRAM

## 2023-12-24 PROCEDURE — 84100 ASSAY OF PHOSPHORUS: CPT | Performed by: INTERNAL MEDICINE

## 2023-12-24 PROCEDURE — 85025 COMPLETE CBC W/AUTO DIFF WBC: CPT | Performed by: INTERNAL MEDICINE

## 2023-12-24 PROCEDURE — 80053 COMPREHEN METABOLIC PANEL: CPT | Performed by: STUDENT IN AN ORGANIZED HEALTH CARE EDUCATION/TRAINING PROGRAM

## 2023-12-24 PROCEDURE — 63710000001 ONDANSETRON PER 8 MG: Performed by: STUDENT IN AN ORGANIZED HEALTH CARE EDUCATION/TRAINING PROGRAM

## 2023-12-24 PROCEDURE — 85025 COMPLETE CBC W/AUTO DIFF WBC: CPT | Performed by: STUDENT IN AN ORGANIZED HEALTH CARE EDUCATION/TRAINING PROGRAM

## 2023-12-24 RX ADMIN — ONDANSETRON HYDROCHLORIDE 4 MG: 4 TABLET, FILM COATED ORAL at 08:17

## 2023-12-24 RX ADMIN — DOXYCYCLINE 100 MG: 100 INJECTION, POWDER, LYOPHILIZED, FOR SOLUTION INTRAVENOUS at 03:04

## 2023-12-24 RX ADMIN — METHOCARBAMOL TABLETS 750 MG: 750 TABLET, COATED ORAL at 11:34

## 2023-12-24 RX ADMIN — METRONIDAZOLE 500 MG: 500 INJECTION, SOLUTION INTRAVENOUS at 11:34

## 2023-12-24 RX ADMIN — ACETAMINOPHEN 1000 MG: 500 TABLET ORAL at 15:29

## 2023-12-24 RX ADMIN — ACETAMINOPHEN 1000 MG: 500 TABLET ORAL at 08:17

## 2023-12-24 RX ADMIN — CEFTRIAXONE 2000 MG: 2 INJECTION, POWDER, FOR SOLUTION INTRAMUSCULAR; INTRAVENOUS at 19:37

## 2023-12-24 RX ADMIN — METRONIDAZOLE 500 MG: 500 INJECTION, SOLUTION INTRAVENOUS at 01:51

## 2023-12-24 RX ADMIN — OXYCODONE HYDROCHLORIDE 10 MG: 5 TABLET ORAL at 10:01

## 2023-12-24 RX ADMIN — ACETAMINOPHEN 1000 MG: 500 TABLET ORAL at 19:37

## 2023-12-24 RX ADMIN — OXYCODONE HYDROCHLORIDE 10 MG: 5 TABLET ORAL at 20:52

## 2023-12-24 RX ADMIN — METRONIDAZOLE 500 MG: 500 INJECTION, SOLUTION INTRAVENOUS at 18:03

## 2023-12-24 RX ADMIN — Medication 10 ML: at 21:03

## 2023-12-24 RX ADMIN — LEVOTHYROXINE SODIUM 25 MCG: 25 TABLET ORAL at 06:09

## 2023-12-24 RX ADMIN — ESCITALOPRAM OXALATE 20 MG: 10 TABLET ORAL at 08:17

## 2023-12-24 RX ADMIN — METHOCARBAMOL TABLETS 750 MG: 750 TABLET, COATED ORAL at 18:03

## 2023-12-24 RX ADMIN — PANTOPRAZOLE SODIUM 40 MG: 40 TABLET, DELAYED RELEASE ORAL at 06:09

## 2023-12-24 RX ADMIN — DOXYCYCLINE 100 MG: 100 INJECTION, POWDER, LYOPHILIZED, FOR SOLUTION INTRAVENOUS at 15:29

## 2023-12-24 RX ADMIN — Medication 10 ML: at 08:17

## 2023-12-24 RX ADMIN — OXYCODONE HYDROCHLORIDE 10 MG: 5 TABLET ORAL at 15:29

## 2023-12-24 RX ADMIN — SENNOSIDES AND DOCUSATE SODIUM 2 TABLET: 8.6; 5 TABLET ORAL at 20:52

## 2023-12-24 RX ADMIN — ACETAMINOPHEN 1000 MG: 500 TABLET ORAL at 01:50

## 2023-12-24 RX ADMIN — METHOCARBAMOL TABLETS 750 MG: 750 TABLET, COATED ORAL at 08:17

## 2023-12-24 RX ADMIN — SENNOSIDES AND DOCUSATE SODIUM 2 TABLET: 8.6; 5 TABLET ORAL at 08:17

## 2023-12-24 RX ADMIN — METHOCARBAMOL TABLETS 750 MG: 750 TABLET, COATED ORAL at 20:52

## 2023-12-24 NOTE — PROGRESS NOTES
[facility] Medicine Services Daily Progress Note    Patient Name: Charity Mcclure  : 1960  MRN: [id number]  Primary Care Physician:  Meron Whittaker MD  Date of admission: 2023      Subjective      Chief Complaint: Abdominal Pain     Brief interim history: 63-year-old hypertension, hypothyroidism, hypertension, mood disorder, and right hip repair.  Patient was admitted on 2023  presented to [facility] ED, complaining of abdominal pain with subjective fever chills and malaise.  Patient initially thought she might have had a flu.  However, she continues to feel bad and developed nonbloody diarrhea, and poor oral intake.    Was initially seen at [facility] ED and CT of the abdomen/pelvis at that facility revealed perforated diverticulum with tubo-ovarian mass, concerning for possible abscess.  Laboratories notable for leukocytosis with WBC of 21 K,   CT of the abdomen/pelvis with contrast (), showed a left anterior pelvis 6.5 x 6.2 cm irregular rim enhancing fluid collection/cystic mass with surrounding inflammatory changes, and imaging features suggest infection such as tubal ovarian abscess or previous bout of perforated diverticulitis with resultant abscess formation.       She is admitted with principal diagnosis of suspected diverticulitis/tubo-ovarian abscess.  She was seen in consultation by OB/GYN and general surgery with recommendations, and she is status post robotic Han colostomy with drainage of abscess and left salpingo-oophorectomy on 2023, and PCA pump for    Yates catheter and INGRID drain discontinued today, overall stable.    Vitals:   Temp:  [97.9 °F (36.6 °C)-98.6 °F (37 °C)] 98.4 °F (36.9 °C)  Heart Rate:  [70-75] 75  Resp:  [16] 16  BP: ()/(51-62) 103/62  Flow (L/min):  [2] 2      Appearance: No apparent distress, non-toxic appearing   HEENT/Neck: Neck is supple, Extraocular movements intact, Moist mucous membranes,    Cardiovascular: Regular Rate and Rhythm, No murmurs, gallops or rubs   Pulmonary: Clear to auscultation Bilaterally.   Abdomen: BS+, Soft, non-distended, ostomy bag in place with air in the bag, incision with dressing that appears clean.    Ext: No Cyanosis, Clubbing, Edema.  Neuro: Non-focal, Alert & Oriented x 3         Result Review    Result Review:  I have personally reviewed the results from the time of this admission to 12/24/2023 16:37 EST and agree with these findings:  []  Laboratory  []  Microbiology  []  Radiology  []  EKG/Telemetry   []  Cardiology/Vascular   []  Pathology  []  Old records  []  Other:  Most notable findings include:     Wounds (last 24 hours)       LDA Wound       Row Name 12/23/23 2017             Wound 12/16/23 Right anterior greater trochanter Incision    Wound - Properties Group Placement Date: 12/16/23  -AG Present on Original Admission: N  -AG Side: Right  -AG Orientation: anterior  -AG Location: greater trochanter  -AG Primary Wound Type: Incision  -AG Wound Outcome: Other  -AG, healing     Dressing Appearance open to air  -JR      Retired Wound - Properties Group Placement Date: 12/16/23  -AG Present on Original Admission: N  -AG Side: Right  -AG Orientation: anterior  -AG Location: greater trochanter  -AG Primary Wound Type: Incision  -AG Wound Outcome: Other  -AG, healing     Retired Wound - Properties Group Date first assessed: 12/16/23  -AG Present on Original Admission: N  -AG Side: Right  -AG Location: greater trochanter  -AG Primary Wound Type: Incision  -AG Wound Outcome: Other  -AG, healing        Wound 12/21/23 1844 abdomen Incision    Wound - Properties Group Placement Date: 12/21/23  -MT Placement Time: 1844 -MT Location: abdomen  -MT Primary Wound Type: Incision  -MT, x5     Dressing Appearance intact;dry  -JR      Closure Unable to assess  -JR      Base dressing in place, unable to visualize  -JR      Retired Wound - Properties Group Placement Date: 12/21/23   -MT Placement Time: 1844 -MT Location: abdomen  -MT Primary Wound Type: Incision  -MT, x5     Retired Wound - Properties Group Date first assessed: 12/21/23  -MT Time first assessed: 1844 -MT Location: abdomen  -MT Primary Wound Type: Incision  -MT, x5        Wound 12/21/23 2224 lower abdomen Incision    Wound - Properties Group Placement Date: 12/21/23  -HB Placement Time: 2224 -HB Orientation: lower  -HB Location: abdomen  -HB Primary Wound Type: Incision  -HB    Dressing Appearance intact;dry  -JR      Closure Unable to assess  -JR      Base dressing in place, unable to visualize  -JR      Retired Wound - Properties Group Placement Date: 12/21/23  -HB Placement Time: 2224 -HB Orientation: lower  -HB Location: abdomen  -HB Primary Wound Type: Incision  -HB    Retired Wound - Properties Group Date first assessed: 12/21/23  -HB Time first assessed: 2224 -HB Location: abdomen  -HB Primary Wound Type: Incision  -HB              User Key  (r) = Recorded By, (t) = Taken By, (c) = Cosigned By      Initials Name Provider Type     Courtney Graham, RN Registered Nurse    MT Amrik Kamara RN Registered Nurse    Carmelita Matos LPN Licensed Nurse    Belinda Lugo RN Registered Nurse                      Assessment & Plan        acetaminophen, 1,000 mg, Oral, Q6H  cefTRIAXone, 2,000 mg, Intravenous, Daily  doxycycline, 100 mg, Intravenous, Q12H  escitalopram, 20 mg, Oral, Daily  levothyroxine, 25 mcg, Oral, Q AM  lisinopril, 10 mg, Oral, Daily  methocarbamol, 750 mg, Oral, 4x Daily  metroNIDAZOLE, 500 mg, Intravenous, Q8H  pantoprazole, 40 mg, Oral, Q AM  senna-docusate sodium, 2 tablet, Oral, BID  sodium chloride, 10 mL, Intravenous, Q12H       sodium chloride, 30 mL/hr         Active Hospital Problems:  Active Hospital Problems    Diagnosis     **Perforated diverticulum     Mood disorder     Hypertension     Disease of thyroid gland     Tubo-ovarian mass         Assessment/plan:    Diverticulitis/tubo-ovarian abscess  -supportive care, pain control, Rocephin/doxycycline/Flagyl  -urine culture showed no growth  -S/p robotic cory colostomy with drainage of abscess and left salpingo-oophorectomy (12/21/2023), on  PCA pump for pain control  -Air in ostomy bag, INGRID drain and Yates catheter discontinued today.  -Rest of management per surgical team.  -Continue out of bed to chair, incentive spirometer.        Hypertension  -Blood pressure at goal, continue lisinopril      Hypothyroidism  -Continue Synthroid      Depression   -Continue Lexapro          DVT prophylaxis: Full code  Continue patient level of care  Plan discussed with patient, nurse and surgical team.                Electronically signed by Daniella Harris MD, 12/24/23, 16:37 EST.  [facility]

## 2023-12-24 NOTE — PLAN OF CARE
Goal Outcome Evaluation:           Progress: no change  Outcome Evaluation: Pt VSS, pt able to make needs known. Minimal c/o of pain this shift. Pt BP was running low so tried to keep from giving too many pain meds throughout shift. Pt resting well in between care. INGRID drain had minimal output this shift. Pt sleeping with call light in reach and bed in lowest position. Plan of care ongoing.

## 2023-12-24 NOTE — PROGRESS NOTES
Colorectal Surgery Progress Note    Pt. Name/Age/:  Charity Mcclure   63 y.o.    1960         Med. Record Number:   [id number]  Date of admission:  2023      Service(s): Colorectal Surgery      Subjective    Emotionally stressed this am due to everything that's going on   Systolic BP improved this am   Reports some pain around drain   No nausea, denies burping   Ostomy with gas in the bag and bowel sweats   Ostomy was digitalized and irrigated with saline    Objective  Vitals:     Patient Vitals for the past 24 hrs:   BP Temp Temp src Pulse Resp SpO2   23 0415 90/58 97.9 °F (36.6 °C) Axillary -- 16 --   23 2134 90/51 98.6 °F (37 °C) Oral 70 16 92 %           Wt. Admission: Weight: 70.2 kg (154 lb 12.2 oz)     Wt. Current: Weight: 70.2 kg (154 lb 12.2 oz)   Body mass index is 29.24 kg/m².      I&O:  Intake/Output Summary (Last 24 hours) at 2023 1150  Last data filed at 2023  Gross per 24 hour   Intake 360 ml   Output 40 ml   Net 320 ml      190 -  0700  In: 360 [P.O.:360]  Out: 860 [Urine:800; Drains:60]      Exam  General:  No acute distress, resting comfortably.  Cardiovascular: regular rate.  Respiratory: no increased work of breathing.  Abdomen:  Soft, appropriate incisional tenderness, non-distended. No diffuse guarding or rebound tenderness. Incisions c/d/I.drain SS, Ostomy with gas in the bag   Extremities: warm, well-perfused extremities, no pitting edema.      Labs:     [unfilled]          Scheduled Meds:acetaminophen, 1,000 mg, Oral, Q6H  cefTRIAXone, 2,000 mg, Intravenous, Daily  doxycycline, 100 mg, Intravenous, Q12H  escitalopram, 20 mg, Oral, Daily  levothyroxine, 25 mcg, Oral, Q AM  lisinopril, 10 mg, Oral, Daily  methocarbamol, 750 mg, Oral, 4x Daily  metroNIDAZOLE, 500 mg, Intravenous, Q8H  pantoprazole, 40 mg, Oral, Q AM  senna-docusate sodium, 2 tablet, Oral, BID  sodium chloride, 10 mL, Intravenous, Q12H      Continuous Infusions:sodium  chloride, 30 mL/hr      PRN Meds:.  acetaminophen    senna-docusate sodium **AND** polyethylene glycol **AND** bisacodyl **AND** [DISCONTINUED] bisacodyl    Calcium Replacement - Follow Nurse / BPA Driven Protocol    HYDROmorphone    ketorolac    Magnesium Standard Dose Replacement - Follow Nurse / BPA Driven Protocol    naloxone    nitroglycerin    ondansetron **OR** ondansetron    ondansetron    oxyCODONE    Phosphorus Replacement - Follow Nurse / BPA Driven Protocol    Potassium Replacement - Follow Nurse / BPA Driven Protocol    sodium chloride    sodium chloride          Assessment  63 y.o. female s/p Robotic johnson colostomy, drainage of abscess, and left salpingo oophorectomy         Plan / Recommendations    - reassured and offered support   - Ostomy irrigated with saline. There is gas coming out. Abdomen is not distended  - minimize narcotics   - out of bed and ambulate as tolerated   - continue with diet, ensure boost ordered  - drain and madison removed. Voiding freely   - continue in patient until pain is controlled and ostomy functions   - antibiotics and duration per ID. Appreciate recommendations     Tentative discharge 12/26/2023      11:50 EST; 12/24/2023        Harvinder Vasquez MD  Colon and Rectal Surgery   [facility]

## 2023-12-25 LAB — POTASSIUM SERPL-SCNC: 3.9 MMOL/L (ref 3.5–5.2)

## 2023-12-25 PROCEDURE — 84132 ASSAY OF SERUM POTASSIUM: CPT | Performed by: INTERNAL MEDICINE

## 2023-12-25 PROCEDURE — 25010000002 CEFTRIAXONE PER 250 MG: Performed by: STUDENT IN AN ORGANIZED HEALTH CARE EDUCATION/TRAINING PROGRAM

## 2023-12-25 PROCEDURE — 99024 POSTOP FOLLOW-UP VISIT: CPT | Performed by: STUDENT IN AN ORGANIZED HEALTH CARE EDUCATION/TRAINING PROGRAM

## 2023-12-25 RX ORDER — POTASSIUM CHLORIDE 20 MEQ/1
40 TABLET, EXTENDED RELEASE ORAL EVERY 4 HOURS
Qty: 4 TABLET | Refills: 0 | Status: COMPLETED | OUTPATIENT
Start: 2023-12-25 | End: 2023-12-25

## 2023-12-25 RX ADMIN — METHOCARBAMOL TABLETS 750 MG: 750 TABLET, COATED ORAL at 17:39

## 2023-12-25 RX ADMIN — LEVOTHYROXINE SODIUM 25 MCG: 25 TABLET ORAL at 06:00

## 2023-12-25 RX ADMIN — METHOCARBAMOL TABLETS 750 MG: 750 TABLET, COATED ORAL at 20:40

## 2023-12-25 RX ADMIN — ACETAMINOPHEN 1000 MG: 500 TABLET ORAL at 20:39

## 2023-12-25 RX ADMIN — ACETAMINOPHEN 1000 MG: 500 TABLET ORAL at 02:36

## 2023-12-25 RX ADMIN — POTASSIUM CHLORIDE 40 MEQ: 1500 TABLET, EXTENDED RELEASE ORAL at 02:36

## 2023-12-25 RX ADMIN — CEFTRIAXONE 2000 MG: 2 INJECTION, POWDER, FOR SOLUTION INTRAMUSCULAR; INTRAVENOUS at 18:23

## 2023-12-25 RX ADMIN — METHOCARBAMOL TABLETS 750 MG: 750 TABLET, COATED ORAL at 11:07

## 2023-12-25 RX ADMIN — ACETAMINOPHEN 1000 MG: 500 TABLET ORAL at 13:29

## 2023-12-25 RX ADMIN — OXYCODONE HYDROCHLORIDE 10 MG: 5 TABLET ORAL at 06:00

## 2023-12-25 RX ADMIN — METHOCARBAMOL TABLETS 750 MG: 750 TABLET, COATED ORAL at 07:17

## 2023-12-25 RX ADMIN — POTASSIUM CHLORIDE 40 MEQ: 1500 TABLET, EXTENDED RELEASE ORAL at 06:00

## 2023-12-25 RX ADMIN — Medication 10 ML: at 20:39

## 2023-12-25 RX ADMIN — PANTOPRAZOLE SODIUM 40 MG: 40 TABLET, DELAYED RELEASE ORAL at 06:00

## 2023-12-25 RX ADMIN — KETOROLAC TROMETHAMINE 10 MG: 10 TABLET, FILM COATED ORAL at 17:39

## 2023-12-25 RX ADMIN — ESCITALOPRAM OXALATE 20 MG: 10 TABLET ORAL at 09:25

## 2023-12-25 RX ADMIN — DOXYCYCLINE 100 MG: 100 INJECTION, POWDER, LYOPHILIZED, FOR SOLUTION INTRAVENOUS at 15:06

## 2023-12-25 RX ADMIN — Medication 10 ML: at 09:38

## 2023-12-25 RX ADMIN — SENNOSIDES AND DOCUSATE SODIUM 2 TABLET: 8.6; 5 TABLET ORAL at 20:40

## 2023-12-25 RX ADMIN — KETOROLAC TROMETHAMINE 10 MG: 10 TABLET, FILM COATED ORAL at 07:17

## 2023-12-25 RX ADMIN — DOXYCYCLINE 100 MG: 100 INJECTION, POWDER, LYOPHILIZED, FOR SOLUTION INTRAVENOUS at 02:36

## 2023-12-25 NOTE — PROGRESS NOTES
[facility] Medicine Services Daily Progress Note    Patient Name: Charity Mcclure  : 1960  MRN: [id number]  Primary Care Physician:  Meron Whittaker MD  Date of admission: 2023      Subjective      Chief Complaint: Abdominal Pain     Brief interim history: 63-year-old hypertension, hypothyroidism, hypertension, mood disorder, and right hip repair.  Patient was admitted on 2023  presented to [facility] ED, complaining of abdominal pain with subjective fever chills and malaise.  Patient initially thought she might have had a flu.  However, she continues to feel bad and developed nonbloody diarrhea, and poor oral intake.    Was initially seen at [facility] ED and CT of the abdomen/pelvis at that facility revealed perforated diverticulum with tubo-ovarian mass, concerning for possible abscess.  Laboratories notable for leukocytosis with WBC of 21 K,   CT of the abdomen/pelvis with contrast (), showed a left anterior pelvis 6.5 x 6.2 cm irregular rim enhancing fluid collection/cystic mass with surrounding inflammatory changes, and imaging features suggest infection such as tubal ovarian abscess or previous bout of perforated diverticulitis with resultant abscess formation.       She is admitted with principal diagnosis of suspected diverticulitis/tubo-ovarian abscess.  She was seen in consultation by OB/GYN and general surgery with recommendations, and she is status post robotic Han colostomy with drainage of abscess and left salpingo-oophorectomy on 2023, and PCA pump for    Yates catheter and INGRID drain discontinued today, overall stable.  She remains hemodynamically stable, pain much improved and she is tolerating her diet.  Ostomy with gas.      Vitals:   Temp:  [97.8 °F (36.6 °C)-98.4 °F (36.9 °C)] 98.4 °F (36.9 °C)  Heart Rate:  [92] 92  Resp:  [12-18] 18  BP: ()/(52-72) 109/59      Appearance: No apparent distress, non-toxic appearing    HEENT/Neck: Neck is supple, Extraocular movements intact, Moist mucous membranes,   Cardiovascular: Regular Rate and Rhythm, No murmurs, gallops or rubs   Pulmonary: Clear to auscultation Bilaterally.   Abdomen: BS+, Soft, non-distended, ostomy bag in place with air in the bag, incision with dressing that appears clean.    Ext: No Cyanosis, Clubbing, Edema.  Neuro: Non-focal, Alert & Oriented x 3         Result Review    Result Review:  I have personally reviewed the results from the time of this admission to 12/25/2023 14:46 EST and agree with these findings:  []  Laboratory  []  Microbiology  []  Radiology  []  EKG/Telemetry   []  Cardiology/Vascular   []  Pathology  []  Old records  []  Other:  Most notable findings include:     Wounds (last 24 hours)       LDA Wound       Row Name 12/25/23 0717 12/24/23 2052          Wound 12/16/23 Right anterior greater trochanter Incision    Wound - Properties Group Placement Date: 12/16/23  -AG Present on Original Admission: N  -AG Side: Right  -AG Orientation: anterior  -AG Location: greater trochanter  -AG Primary Wound Type: Incision  -AG Wound Outcome: Other  -AG, healing     Dressing Appearance open to air  -BC open to air  -CC     Drainage Amount -- none  -CC     Retired Wound - Properties Group Placement Date: 12/16/23  -AG Present on Original Admission: N  -AG Side: Right  -AG Orientation: anterior  -AG Location: greater trochanter  -AG Primary Wound Type: Incision  -AG Wound Outcome: Other  -AG, healing     Retired Wound - Properties Group Date first assessed: 12/16/23  -AG Present on Original Admission: N  -AG Side: Right  -AG Location: greater trochanter  -AG Primary Wound Type: Incision  -AG Wound Outcome: Other  -AG, healing        Wound 12/21/23 1844 abdomen Incision    Wound - Properties Group Placement Date: 12/21/23  -MT Placement Time: 1844  -MT Location: abdomen  -MT Primary Wound Type: Incision  -MT, x5     Dressing Appearance intact;dry  -BC dry;intact   -CC     Closure Open to air  -BC Open to air  -CC     Drainage Amount -- none  -CC     Retired Wound - Properties Group Placement Date: 12/21/23  -MT Placement Time: 1844 -MT Location: abdomen  -MT Primary Wound Type: Incision  -MT, x5     Retired Wound - Properties Group Date first assessed: 12/21/23  -MT Time first assessed: 1844  -MT Location: abdomen  -MT Primary Wound Type: Incision  -MT, x5        Wound 12/21/23 2224 lower abdomen Incision    Wound - Properties Group Placement Date: 12/21/23  -HB Placement Time: 2224 -HB Orientation: lower  -HB Location: abdomen  -HB Primary Wound Type: Incision  -HB    Dressing Appearance dry;intact;open to air  -BC dry;intact  -CC     Closure -- Open to air  -CC     Drainage Amount -- none  -CC     Retired Wound - Properties Group Placement Date: 12/21/23  -HB Placement Time: 2224 -HB Orientation: lower  -HB Location: abdomen  -HB Primary Wound Type: Incision  -HB    Retired Wound - Properties Group Date first assessed: 12/21/23  -HB Time first assessed: 2224 -HB Location: abdomen  -HB Primary Wound Type: Incision  -HB              User Key  (r) = Recorded By, (t) = Taken By, (c) = Cosigned By      Initials Name Provider Type     Courtney Graham RN Registered Nurse    Amrik Donato RN Registered Nurse    Belinda Lugo RN Registered Nurse    Iesha Celaya LPN Licensed Nurse    Khari Lyons LPN Licensed Nurse                      Assessment & Plan        acetaminophen, 1,000 mg, Oral, Q6H  cefTRIAXone, 2,000 mg, Intravenous, Daily  doxycycline, 100 mg, Intravenous, Q12H  escitalopram, 20 mg, Oral, Daily  levothyroxine, 25 mcg, Oral, Q AM  lisinopril, 10 mg, Oral, Daily  methocarbamol, 750 mg, Oral, 4x Daily  pantoprazole, 40 mg, Oral, Q AM  senna-docusate sodium, 2 tablet, Oral, BID  sodium chloride, 10 mL, Intravenous, Q12H       sodium chloride, 30 mL/hr, Last Rate: Stopped (12/25/23 1026)         Active Hospital Problems:  Active Hospital  Problems    Diagnosis     **Perforated diverticulum     Mood disorder     Hypertension     Disease of thyroid gland     Tubo-ovarian mass        Assessment/plan:    Diverticulitis/tubo-ovarian abscess  -supportive care, pain control, Rocephin/doxycycline/Flagyl  -urine culture showed no growth  -S/p robotic cory colostomy with drainage of abscess and left salpingo-oophorectomy (12/21/2023), on  PCA pump for pain control  -Air in ostomy bag, INGRID drain and Yates catheter discontinued.  -She is tolerating her diet and ambulating well.  -Rest of management per surgical team.  -Continue out of bed to chair, incentive spirometer.        Hypertension  -Blood pressure at goal, continue lisinopril      Hypothyroidism  -Continue Synthroid      Depression   -Continue Lexapro          DVT prophylaxis: Full code  Continue patient level of care  Plan discussed with patient            Electronically signed by Daniella Harris MD, 12/25/23, 14:46 EST.  [facility]

## 2023-12-25 NOTE — PROGRESS NOTES
Colorectal Surgery Progress Note    Pt. Name/Age/:  Charity Mcclure   63 y.o.    1960         Med. Record Number:   [id number]  Date of admission:  2023      Service(s): Colorectal Surgery      Subjective    Doing well   Pain much improved  Out of bed and ambulating   Voiding freely   Ostomy with gas  Tolerating diet     Objective  Vitals:     Patient Vitals for the past 24 hrs:   BP Temp Temp src Pulse Resp SpO2   23 1219 109/59 98.4 °F (36.9 °C) Oral 92 18 97 %   23 0925 92/52 -- -- -- -- --   23 0500 118/72 97.8 °F (36.6 °C) Axillary -- 12 --   23 2100 -- 98 °F (36.7 °C) Oral -- 14 --           Wt. Admission: Weight: 70.2 kg (154 lb 12.2 oz)     Wt. Current: Weight: 70.2 kg (154 lb 12.2 oz)   Body mass index is 29.24 kg/m².      I&O:  Intake/Output Summary (Last 24 hours) at 2023 1239  Last data filed at 2023 1100  Gross per 24 hour   Intake 480 ml   Output 0 ml   Net 480 ml      1901 -  0700  In: 360 [P.O.:360]  Out: 0       Exam  General:  No acute distress, resting comfortably.  Cardiovascular: regular rate.  Respiratory: no increased work of breathing.  Abdomen:  Soft, appropriate incisional tenderness, non-distended. No diffuse guarding or rebound tenderness. Incisions c/d/I.drain SS, Ostomy with gas in the bag   Extremities: warm, well-perfused extremities, no pitting edema.          Scheduled Meds:acetaminophen, 1,000 mg, Oral, Q6H  cefTRIAXone, 2,000 mg, Intravenous, Daily  doxycycline, 100 mg, Intravenous, Q12H  escitalopram, 20 mg, Oral, Daily  levothyroxine, 25 mcg, Oral, Q AM  lisinopril, 10 mg, Oral, Daily  methocarbamol, 750 mg, Oral, 4x Daily  pantoprazole, 40 mg, Oral, Q AM  senna-docusate sodium, 2 tablet, Oral, BID  sodium chloride, 10 mL, Intravenous, Q12H      Continuous Infusions:sodium chloride, 30 mL/hr, Last Rate: Stopped (23 1026)      PRN Meds:.  acetaminophen    senna-docusate sodium **AND** polyethylene glycol **AND**  bisacodyl **AND** [DISCONTINUED] bisacodyl    Calcium Replacement - Follow Nurse / BPA Driven Protocol    HYDROmorphone    ketorolac    Magnesium Standard Dose Replacement - Follow Nurse / BPA Driven Protocol    naloxone    nitroglycerin    ondansetron **OR** ondansetron    ondansetron    oxyCODONE    Phosphorus Replacement - Follow Nurse / BPA Driven Protocol    Potassium Replacement - Follow Nurse / BPA Driven Protocol    sodium chloride    sodium chloride          Assessment  63 y.o. female s/p Robotic johnson colostomy, drainage of abscess, and left salpingo oophorectomy       Plan / Recommendations    - doing well   - minimize narcotics   - out of bed and ambulate as tolerated   - continue with diet,  - continue in patient until pain is controlled and ostomy functions   - antibiotics and duration per ID. Appreciate recommendations      Tentative discharge 12/26/2023      12:39 EST; 12/25/2023        Harvinder Vasquez MD  Colon and Rectal Surgery   [facility]

## 2023-12-25 NOTE — PLAN OF CARE
Goal Outcome Evaluation:              Outcome Evaluation: Pt resting in bed with minimal complaitns of pain, treated per MAR. INGRID drain removed yesterday morning. VSS, room air, call light within reach. Will continue to monitor.

## 2023-12-25 NOTE — PLAN OF CARE
Goal Outcome Evaluation:  Plan of Care Reviewed With: patient        Progress: improving     A&ox4. Vss. Min c/o pain. Up ad alexus. Able to communicate needs. Ostomy teaching followed up on. Advanced to regular diet. Tolerating well. Plan of care ongoing.     Problem: Adult Inpatient Plan of Care  Goal: Plan of Care Review  Outcome: Ongoing, Progressing  Flowsheets (Taken 12/25/2023 1135)  Progress: improving  Plan of Care Reviewed With: patient  Goal: Patient-Specific Goal (Individualized)  Outcome: Ongoing, Progressing  Goal: Absence of Hospital-Acquired Illness or Injury  Outcome: Ongoing, Progressing  Intervention: Identify and Manage Fall Risk  Description: Perform standard risk assessment on admission using a validated tool or comprehensive approach appropriate to the patient; reassess fall risk frequently, with change in status or transfer to another level of care.  Communicate fall injury risk to interprofessional healthcare team.  Determine need for increased observation, equipment and environmental modification, such as low bed, signage and supportive, nonskid footwear.  Adjust safety measures to individual developmental age, stage and identified risk factors.  Reinforce the importance of safety and physical activity with patient and family.  Perform regular intentional rounding to assess need for position change, pain assessment and personal needs, including assistance with toileting.  Recent Flowsheet Documentation  Taken 12/25/2023 1000 by Iesha Barrera LPN  Safety Promotion/Fall Prevention: safety round/check completed  Taken 12/25/2023 0717 by Iesha Barrera LPN  Safety Promotion/Fall Prevention: safety round/check completed  Intervention: Prevent Skin Injury  Description: Perform a screening for skin injury risk, such as pressure or moisture associated skin damage on admission and at regular intervals throughout hospital stay.  Keep all areas of skin (especially folds) clean and dry.  Maintain  adequate skin hydration.  Relieve and redistribute pressure and protect bony prominences; implement measures based on patient-specific risk factors.  Match turning and repositioning schedule to clinical condition.  Encourage weight shift frequently; assist with reposition if unable to complete independently.  Float heels off bed; avoid pressure on the Achilles tendon.  Keep skin free from extended contact with medical devices.  Encourage functional activity and mobility, as early as tolerated.  Use aids (e.g., slide boards, mechanical lift) during transfer.  Recent Flowsheet Documentation  Taken 12/25/2023 0717 by Iesha Barrera LPN  Body Position: position changed independently  Intervention: Prevent and Manage VTE (Venous Thromboembolism) Risk  Description: Assess for VTE (venous thromboembolism) risk.  Encourage and assist with early ambulation.  Initiate and maintain compression or other therapy, as indicated, based on identified risk in accordance with organizational protocol and provider order.  Encourage both active and passive leg exercises while in bed, if unable to ambulate.  Recent Flowsheet Documentation  Taken 12/25/2023 0717 by Iesha Barrera LPN  Activity Management: up to bedside commode  Goal: Optimal Comfort and Wellbeing  Outcome: Ongoing, Progressing  Intervention: Provide Person-Centered Care  Description: Use a family-focused approach to care.  Develop trust and rapport by proactively providing information, encouraging questions, addressing concerns and offering reassurance.  Acknowledge emotional response to hospitalization.  Recognize and utilize personal coping strategies.  Honor spiritual and cultural preferences.  Recent Flowsheet Documentation  Taken 12/25/2023 0717 by Iesha Barrera LPN  Trust Relationship/Rapport:   care explained   choices provided   empathic listening provided   reassurance provided   thoughts/feelings acknowledged  Goal: Readiness for Transition of  Care  Outcome: Ongoing, Progressing     Problem: Pain Acute  Goal: Acceptable Pain Control and Functional Ability  Outcome: Ongoing, Progressing  Intervention: Prevent or Manage Pain  Description: Evaluate pain level, effect of treatment and patient response at regular intervals.  Minimize painful stimuli; coordinate care and adjust environment (e.g., light, noise, unnecessary movement); promote sleep/rest.  Match pharmacologic analgesia to severity and type of pain mechanism (e.g., neuropathic, muscle, inflammatory); consider multimodal approach (e.g., nonopioid, opioid, adjuvant).  Provide medication at regular intervals; titrate to patient response; premedicate for painful procedures.  Manage breakthrough pain with additional doses; consider rotation or switching medication.  Monitor for signs of substance tolerance (increased dose to reach desired effect, decreased effect with same dose).  Manage medication-induced effects, such as constipation, nausea, pruritus, urinary retention, somnolence and dizziness.  Provide multimodal interventions, such as as physical activity, therapeutic exercise, yoga, TENS (transcutaneous electrical nerve stimulation) and manual therapy.  Train in functional activity modifications, such as body mechanics, posture, ergonomics, energy conservation and activity pacing.  Consider addition of complementary or alternative therapy, such as acupuncture, hypnosis or therapeutic touch.  Recent Flowsheet Documentation  Taken 12/25/2023 0717 by Iesha Barrera LPN  Sensory Stimulation Regulation: care clustered  Intervention: Optimize Psychosocial Wellbeing  Description: Facilitate patient’s self-control over pain by providing pain information and allowing choices in treatment.  Consider and address emotional response to pain.  Explore and promote use of coping strategies; address barriers to successful coping.  Evaluate and assist with psychosocial, cultural and spiritual factors impacting  pain.  Modify pain perception using techniques, such as distraction, mindfulness, guided imagery, meditation or music.  Assess for risk factors for developing chronic pain, such as depression, fear, pain avoidance and pain catastrophizing.  Consider referral for ongoing coping support, such as education, relaxation training and role of thoughts.  Recent Flowsheet Documentation  Taken 12/25/2023 0717 by Iesha Barrera LPN  Supportive Measures: active listening utilized     Problem: Fall Injury Risk  Goal: Absence of Fall and Fall-Related Injury  Outcome: Ongoing, Progressing  Intervention: Promote Injury-Free Environment  Description: Provide a safe, barrier-free environment that encourages independent activity.  Keep care area uncluttered and well-lighted.  Determine need for increased observation or monitoring.  Avoid use of devices that minimize mobility, such as restraints or indwelling urinary catheter.  Recent Flowsheet Documentation  Taken 12/25/2023 1000 by Iesha Barrera LPN  Safety Promotion/Fall Prevention: safety round/check completed  Taken 12/25/2023 0717 by Iesha Barrera LPN  Safety Promotion/Fall Prevention: safety round/check completed

## 2023-12-26 ENCOUNTER — READMISSION MANAGEMENT (OUTPATIENT)
Dept: CALL CENTER | Facility: HOSPITAL | Age: 63
End: 2023-12-26
Payer: COMMERCIAL

## 2023-12-26 VITALS
SYSTOLIC BLOOD PRESSURE: 125 MMHG | HEIGHT: 61 IN | BODY MASS INDEX: 29.22 KG/M2 | TEMPERATURE: 98.2 F | OXYGEN SATURATION: 95 % | HEART RATE: 80 BPM | RESPIRATION RATE: 17 BRPM | WEIGHT: 154.76 LBS | DIASTOLIC BLOOD PRESSURE: 72 MMHG

## 2023-12-26 LAB
ALBUMIN SERPL-MCNC: 2.3 G/DL (ref 3.5–5.2)
ALBUMIN/GLOB SERPL: 0.9 G/DL
ALP SERPL-CCNC: 80 U/L (ref 39–117)
ALT SERPL W P-5'-P-CCNC: 10 U/L (ref 1–33)
ANION GAP SERPL CALCULATED.3IONS-SCNC: 7 MMOL/L (ref 5–15)
AST SERPL-CCNC: 21 U/L (ref 1–32)
BASOPHILS # BLD AUTO: 0.1 10*3/MM3 (ref 0–0.2)
BASOPHILS NFR BLD AUTO: 0.5 % (ref 0–1.5)
BILIRUB SERPL-MCNC: 0.2 MG/DL (ref 0–1.2)
BUN SERPL-MCNC: 12 MG/DL (ref 8–23)
BUN/CREAT SERPL: 21.1 (ref 7–25)
CALCIUM SPEC-SCNC: 8 MG/DL (ref 8.6–10.5)
CHLORIDE SERPL-SCNC: 107 MMOL/L (ref 98–107)
CO2 SERPL-SCNC: 26 MMOL/L (ref 22–29)
CREAT SERPL-MCNC: 0.57 MG/DL (ref 0.57–1)
DEPRECATED RDW RBC AUTO: 54.3 FL (ref 37–54)
EGFRCR SERPLBLD CKD-EPI 2021: 102.3 ML/MIN/1.73
EOSINOPHIL # BLD AUTO: 0.1 10*3/MM3 (ref 0–0.4)
EOSINOPHIL NFR BLD AUTO: 0.9 % (ref 0.3–6.2)
ERYTHROCYTE [DISTWIDTH] IN BLOOD BY AUTOMATED COUNT: 16.1 % (ref 12.3–15.4)
GLOBULIN UR ELPH-MCNC: 2.7 GM/DL
GLUCOSE SERPL-MCNC: 116 MG/DL (ref 65–99)
HCT VFR BLD AUTO: 24.2 % (ref 34–46.6)
HGB BLD-MCNC: 8 G/DL (ref 12–15.9)
LAB AP CASE REPORT: NORMAL
LAB AP CASE REPORT: NORMAL
LYMPHOCYTES # BLD AUTO: 2 10*3/MM3 (ref 0.7–3.1)
LYMPHOCYTES NFR BLD AUTO: 13.7 % (ref 19.6–45.3)
MAGNESIUM SERPL-MCNC: 1.7 MG/DL (ref 1.6–2.4)
MCH RBC QN AUTO: 29.6 PG (ref 26.6–33)
MCHC RBC AUTO-ENTMCNC: 32.9 G/DL (ref 31.5–35.7)
MCV RBC AUTO: 90.2 FL (ref 79–97)
MONOCYTES # BLD AUTO: 1.2 10*3/MM3 (ref 0.1–0.9)
MONOCYTES NFR BLD AUTO: 8 % (ref 5–12)
NEUTROPHILS NFR BLD AUTO: 11.4 10*3/MM3 (ref 1.7–7)
NEUTROPHILS NFR BLD AUTO: 76.9 % (ref 42.7–76)
NRBC BLD AUTO-RTO: 0.1 /100 WBC (ref 0–0.2)
PATH REPORT.FINAL DX SPEC: NORMAL
PATH REPORT.FINAL DX SPEC: NORMAL
PATH REPORT.GROSS SPEC: NORMAL
PATH REPORT.GROSS SPEC: NORMAL
PHOSPHATE SERPL-MCNC: 2.3 MG/DL (ref 2.5–4.5)
PLATELET # BLD AUTO: 438 10*3/MM3 (ref 140–450)
PMV BLD AUTO: 7.2 FL (ref 6–12)
POTASSIUM SERPL-SCNC: 4.3 MMOL/L (ref 3.5–5.2)
PROT SERPL-MCNC: 5 G/DL (ref 6–8.5)
RBC # BLD AUTO: 2.69 10*6/MM3 (ref 3.77–5.28)
SODIUM SERPL-SCNC: 140 MMOL/L (ref 136–145)
WBC NRBC COR # BLD AUTO: 14.8 10*3/MM3 (ref 3.4–10.8)

## 2023-12-26 PROCEDURE — 83735 ASSAY OF MAGNESIUM: CPT | Performed by: INTERNAL MEDICINE

## 2023-12-26 PROCEDURE — 80053 COMPREHEN METABOLIC PANEL: CPT | Performed by: STUDENT IN AN ORGANIZED HEALTH CARE EDUCATION/TRAINING PROGRAM

## 2023-12-26 PROCEDURE — 99024 POSTOP FOLLOW-UP VISIT: CPT | Performed by: STUDENT IN AN ORGANIZED HEALTH CARE EDUCATION/TRAINING PROGRAM

## 2023-12-26 PROCEDURE — 85025 COMPLETE CBC W/AUTO DIFF WBC: CPT | Performed by: STUDENT IN AN ORGANIZED HEALTH CARE EDUCATION/TRAINING PROGRAM

## 2023-12-26 PROCEDURE — 84100 ASSAY OF PHOSPHORUS: CPT | Performed by: INTERNAL MEDICINE

## 2023-12-26 RX ORDER — ASPIRIN 81 MG/1
81 TABLET ORAL DAILY
Qty: 30 TABLET | Refills: 0 | Status: SHIPPED | OUTPATIENT
Start: 2023-12-26 | End: 2024-01-25

## 2023-12-26 RX ORDER — SACCHAROMYCES BOULARDII 250 MG
250 CAPSULE ORAL 2 TIMES DAILY
Qty: 20 CAPSULE | Refills: 0 | Status: SHIPPED | OUTPATIENT
Start: 2023-12-26 | End: 2024-01-05

## 2023-12-26 RX ORDER — OXYCODONE HYDROCHLORIDE 5 MG/1
5 TABLET ORAL EVERY 6 HOURS PRN
Qty: 20 TABLET | Refills: 0 | Status: SHIPPED | OUTPATIENT
Start: 2023-12-26 | End: 2024-01-04

## 2023-12-26 RX ORDER — AMOXICILLIN AND CLAVULANATE POTASSIUM 875; 125 MG/1; MG/1
1 TABLET, FILM COATED ORAL 2 TIMES DAILY
Qty: 18 TABLET | Refills: 0 | Status: SHIPPED | OUTPATIENT
Start: 2023-12-26 | End: 2024-01-04

## 2023-12-26 RX ORDER — PANTOPRAZOLE SODIUM 40 MG/1
40 TABLET, DELAYED RELEASE ORAL
Qty: 30 TABLET | Refills: 0 | Status: SHIPPED | OUTPATIENT
Start: 2023-12-27 | End: 2024-01-26

## 2023-12-26 RX ADMIN — ACETAMINOPHEN 1000 MG: 500 TABLET ORAL at 08:31

## 2023-12-26 RX ADMIN — METHOCARBAMOL TABLETS 750 MG: 750 TABLET, COATED ORAL at 08:31

## 2023-12-26 RX ADMIN — ACETAMINOPHEN 1000 MG: 500 TABLET ORAL at 02:56

## 2023-12-26 RX ADMIN — SENNOSIDES AND DOCUSATE SODIUM 2 TABLET: 8.6; 5 TABLET ORAL at 08:31

## 2023-12-26 RX ADMIN — METHOCARBAMOL TABLETS 750 MG: 750 TABLET, COATED ORAL at 12:41

## 2023-12-26 RX ADMIN — PANTOPRAZOLE SODIUM 40 MG: 40 TABLET, DELAYED RELEASE ORAL at 05:37

## 2023-12-26 RX ADMIN — ACETAMINOPHEN 1000 MG: 500 TABLET ORAL at 14:45

## 2023-12-26 RX ADMIN — LEVOTHYROXINE SODIUM 25 MCG: 25 TABLET ORAL at 05:37

## 2023-12-26 RX ADMIN — LISINOPRIL 10 MG: 5 TABLET ORAL at 08:31

## 2023-12-26 RX ADMIN — DOXYCYCLINE 100 MG: 100 INJECTION, POWDER, LYOPHILIZED, FOR SOLUTION INTRAVENOUS at 02:56

## 2023-12-26 RX ADMIN — ESCITALOPRAM OXALATE 20 MG: 10 TABLET ORAL at 08:31

## 2023-12-26 NOTE — PROGRESS NOTES
Colorectal Surgery Progress Note    Pt. Name/Age/:  Charity Mcclure   63 y.o.    1960         Med. Record Number:   [id number]  Date of admission:  2023      Service(s): Colorectal Surgery      Subjective    Doing well.  Ostomy with stool  Denies nausea  Eating and tolerating well   Getting out of bed     Objective  Vitals:     Patient Vitals for the past 24 hrs:   BP Temp Temp src Pulse Resp SpO2   23 0831 144/82 -- -- 77 -- --   23 0456 -- 98.2 °F (36.8 °C) Oral -- 20 --   23 1956 102/58 98.1 °F (36.7 °C) Oral -- 21 --   23 1219 109/59 98.4 °F (36.9 °C) Oral 92 18 97 %   23 0925 92/52 -- -- -- -- --           Wt. Admission: Weight: 70.2 kg (154 lb 12.2 oz)     Wt. Current: Weight: 70.2 kg (154 lb 12.2 oz)   Body mass index is 29.24 kg/m².      I&O:  Intake/Output Summary (Last 24 hours) at 2023 0846  Last data filed at 2023 0305  Gross per 24 hour   Intake 840 ml   Output 60 ml   Net 780 ml      190 -  0700  In: 840 [P.O.:840]  Out: 60       Exam  General:  No acute distress, resting comfortably.  Cardiovascular: regular rate.  Respiratory: no increased work of breathing.  Abdomen:  Soft, appropriate incisional tenderness, non-distended. No diffuse guarding or rebound tenderness. Incisions c/d/I.drain SS, Ostomy with stool and gas in the bag   Extremities: warm, well-perfused extremities, no pitting edema.      Labs:     [unfilled]          Scheduled Meds:acetaminophen, 1,000 mg, Oral, Q6H  cefTRIAXone, 2,000 mg, Intravenous, Daily  doxycycline, 100 mg, Intravenous, Q12H  escitalopram, 20 mg, Oral, Daily  levothyroxine, 25 mcg, Oral, Q AM  lisinopril, 10 mg, Oral, Daily  methocarbamol, 750 mg, Oral, 4x Daily  pantoprazole, 40 mg, Oral, Q AM  senna-docusate sodium, 2 tablet, Oral, BID  sodium chloride, 10 mL, Intravenous, Q12H      Continuous Infusions:sodium chloride, 30 mL/hr, Last Rate: Stopped (23 1026)      PRN Meds:.  acetaminophen     senna-docusate sodium **AND** polyethylene glycol **AND** bisacodyl **AND** [DISCONTINUED] bisacodyl    Calcium Replacement - Follow Nurse / BPA Driven Protocol    HYDROmorphone    ketorolac    Magnesium Standard Dose Replacement - Follow Nurse / BPA Driven Protocol    naloxone    nitroglycerin    ondansetron **OR** ondansetron    ondansetron    oxyCODONE    Phosphorus Replacement - Follow Nurse / BPA Driven Protocol    Potassium Replacement - Follow Nurse / BPA Driven Protocol    sodium chloride    sodium chloride          Assessment  63 y.o. female s/p Robotic johnson colostomy, drainage of abscess, and left salpingo oophorectomy        Plan / Recommendations     - doing well   - minimize narcotics   - out of bed and ambulate as tolerated   - continue with diet,  - DC home with pain meds (oxycodon) and aspirin for DVT prophylaxis   - antibiotics and duration per ID. Appreciate recommendations      - Discharge Home today       08:46 EST; 12/26/2023        Harvinder Vasquez MD  Colon and Rectal Surgery   [facility]

## 2023-12-26 NOTE — PAYOR COMM NOTE
"Charity Menchaca (63 y.o. Female)  1960  Ref #[id number]   Clinical UD- Pt remains in Hospital 23    AUTHORIZATION PENDING  PLEASE FORWARD DETERMINATION TO FOLLOWING CONTACT:    LIANNA JONES LPN UR  Utilization Review Nurse  [facility]  Direct & confidential phone # [phone]  Fax # [phone]        Date of Birth   1960    Social Security Number       Address   [address] [place] [address]    Home Phone   [phone]    MRN   [id number]       Pentecostalism   None    Marital Status                               Admission Date   23    Admission Type   Urgent    Admitting Provider   Kleber Steele MD    Attending Provider   Daniella Harris MD    Department, Room/Bed   [facility] SURGICAL INPATIENT, /       Discharge Date       Discharge Disposition       Discharge Destination                                 Attending Provider: Daniella Harris MD    Allergies: No Known Allergies    Isolation: None   Infection: None   Code Status: CPR    Ht: 154.9 cm (61\")   Wt: 70.2 kg (154 lb 12.2 oz)    Admission Cmt: None   Principal Problem: Perforated diverticulum [K57.80]                   Active Insurance as of 2023       Primary Coverage       Payor Plan Insurance Group Employer/Plan Group    [facility] [facility] [employer] [facility] PPO [id number]       Payor Plan Address Payor Plan Phone Number Payor Plan Fax Number Effective Dates    [address] [phone]  2022 - None Entered    [place] [address]         Subscriber Name Subscriber Birth Date Member ID       CHARITY MENCHACA 1960 [id number]                     Emergency Contacts        (Rel.) Home Phone Work Phone Mobile Phone    SHAHLA MENCHACA (Spouse) [phone] -- [phone]                 Physician Progress Notes (last 24 hours)        Harvinder Vasquez MD at 23 0846          Colorectal Surgery Progress Note    Pt. Name/Age/:  Charity " Ren   63 y.o.    1960         Med. Record Number:   [id number]  Date of admission:  12/16/2023      Service(s): Colorectal Surgery      Subjective    Doing well.  Ostomy with stool  Denies nausea  Eating and tolerating well   Getting out of bed     Objective  Vitals:     Patient Vitals for the past 24 hrs:   BP Temp Temp src Pulse Resp SpO2   12/26/23 0831 144/82 -- -- 77 -- --   12/26/23 0456 -- 98.2 °F (36.8 °C) Oral -- 20 --   12/25/23 1956 102/58 98.1 °F (36.7 °C) Oral -- 21 --   12/25/23 1219 109/59 98.4 °F (36.9 °C) Oral 92 18 97 %   12/25/23 0925 92/52 -- -- -- -- --           Wt. Admission: Weight: 70.2 kg (154 lb 12.2 oz)     Wt. Current: Weight: 70.2 kg (154 lb 12.2 oz)   Body mass index is 29.24 kg/m².      I&O:  Intake/Output Summary (Last 24 hours) at 12/26/2023 0846  Last data filed at 12/26/2023 0305  Gross per 24 hour   Intake 840 ml   Output 60 ml   Net 780 ml     12/24 1901 - 12/26 0700  In: 840 [P.O.:840]  Out: 60       Exam  General:  No acute distress, resting comfortably.  Cardiovascular: regular rate.  Respiratory: no increased work of breathing.  Abdomen:  Soft, appropriate incisional tenderness, non-distended. No diffuse guarding or rebound tenderness. Incisions c/d/I.drain SS, Ostomy with stool and gas in the bag   Extremities: warm, well-perfused extremities, no pitting edema.      Labs:     [unfilled]          Scheduled Meds:acetaminophen, 1,000 mg, Oral, Q6H  cefTRIAXone, 2,000 mg, Intravenous, Daily  doxycycline, 100 mg, Intravenous, Q12H  escitalopram, 20 mg, Oral, Daily  levothyroxine, 25 mcg, Oral, Q AM  lisinopril, 10 mg, Oral, Daily  methocarbamol, 750 mg, Oral, 4x Daily  pantoprazole, 40 mg, Oral, Q AM  senna-docusate sodium, 2 tablet, Oral, BID  sodium chloride, 10 mL, Intravenous, Q12H      Continuous Infusions:sodium chloride, 30 mL/hr, Last Rate: Stopped (12/25/23 1026)      PRN Meds:.  acetaminophen    senna-docusate sodium **AND** polyethylene glycol **AND**  bisacodyl **AND** [DISCONTINUED] bisacodyl    Calcium Replacement - Follow Nurse / BPA Driven Protocol    HYDROmorphone    ketorolac    Magnesium Standard Dose Replacement - Follow Nurse / BPA Driven Protocol    naloxone    nitroglycerin    ondansetron **OR** ondansetron    ondansetron    oxyCODONE    Phosphorus Replacement - Follow Nurse / BPA Driven Protocol    Potassium Replacement - Follow Nurse / BPA Driven Protocol    sodium chloride    sodium chloride          Assessment  63 y.o. female s/p Robotic johnson colostomy, drainage of abscess, and left salpingo oophorectomy        Plan / Recommendations     - doing well   - minimize narcotics   - out of bed and ambulate as tolerated   - continue with diet,  - DC home with pain meds (oxycodon) and aspirin for DVT prophylaxis   - antibiotics and duration per ID. Appreciate recommendations      - Discharge Home today       08:46 EST; 2023        Harvinder Vasquez MD  Colon and Rectal Surgery   [facility]        Electronically signed by Harvinder Vasquez MD at 23 0860       Daniella Harris MD at 23 1446              [facility] Medicine Services Daily Progress Note    Patient Name: Charity Mcclure  : 1960  MRN: [id number]  Primary Care Physician:  Meron Whittaker MD  Date of admission: 2023      Subjective      Chief Complaint: Abdominal Pain     Brief interim history: 63-year-old hypertension, hypothyroidism, hypertension, mood disorder, and right hip repair.  Patient was admitted on 2023  presented to [facility] ED, complaining of abdominal pain with subjective fever chills and malaise.  Patient initially thought she might have had a flu.  However, she continues to feel bad and developed nonbloody diarrhea, and poor oral intake.    Was initially seen at [facility] ED and CT of the abdomen/pelvis at that facility revealed perforated diverticulum with tubo-ovarian mass, concerning for possible abscess.   Laboratories notable for leukocytosis with WBC of 21 K,   CT of the abdomen/pelvis with contrast (6/19), showed a left anterior pelvis 6.5 x 6.2 cm irregular rim enhancing fluid collection/cystic mass with surrounding inflammatory changes, and imaging features suggest infection such as tubal ovarian abscess or previous bout of perforated diverticulitis with resultant abscess formation.       She is admitted with principal diagnosis of suspected diverticulitis/tubo-ovarian abscess.  She was seen in consultation by OB/GYN and general surgery with recommendations, and she is status post robotic Han colostomy with drainage of abscess and left salpingo-oophorectomy on 12/21/2023, and PCA pump for    Yates catheter and INGRID drain discontinued today, overall stable.  She remains hemodynamically stable, pain much improved and she is tolerating her diet.  Ostomy with gas.      Vitals:   Temp:  [97.8 °F (36.6 °C)-98.4 °F (36.9 °C)] 98.4 °F (36.9 °C)  Heart Rate:  [92] 92  Resp:  [12-18] 18  BP: ()/(52-72) 109/59      Appearance: No apparent distress, non-toxic appearing   HEENT/Neck: Neck is supple, Extraocular movements intact, Moist mucous membranes,   Cardiovascular: Regular Rate and Rhythm, No murmurs, gallops or rubs   Pulmonary: Clear to auscultation Bilaterally.   Abdomen: BS+, Soft, non-distended, ostomy bag in place with air in the bag, incision with dressing that appears clean.    Ext: No Cyanosis, Clubbing, Edema.  Neuro: Non-focal, Alert & Oriented x 3         Result Review    Result Review:  I have personally reviewed the results from the time of this admission to 12/25/2023 14:46 EST and agree with these findings:  []  Laboratory  []  Microbiology  []  Radiology  []  EKG/Telemetry   []  Cardiology/Vascular   []  Pathology  []  Old records  []  Other:  Most notable findings include:     Wounds (last 24 hours)       LDA Wound       Row Name 12/25/23 0717 12/24/23 2052          Wound 12/16/23 Right  anterior greater trochanter Incision    Wound - Properties Group Placement Date: 12/16/23  -AG Present on Original Admission: N  -AG Side: Right  -AG Orientation: anterior  -AG Location: greater trochanter  -AG Primary Wound Type: Incision  -AG Wound Outcome: Other  -AG, healing     Dressing Appearance open to air  -BC open to air  -CC     Drainage Amount -- none  -CC     Retired Wound - Properties Group Placement Date: 12/16/23  -AG Present on Original Admission: N  -AG Side: Right  -AG Orientation: anterior  -AG Location: greater trochanter  -AG Primary Wound Type: Incision  -AG Wound Outcome: Other  -AG, healing     Retired Wound - Properties Group Date first assessed: 12/16/23  -AG Present on Original Admission: N  -AG Side: Right  -AG Location: greater trochanter  -AG Primary Wound Type: Incision  -AG Wound Outcome: Other  -AG, healing        Wound 12/21/23 1844 abdomen Incision    Wound - Properties Group Placement Date: 12/21/23  -MT Placement Time: 1844  -MT Location: abdomen  -MT Primary Wound Type: Incision  -MT, x5     Dressing Appearance intact;dry  -BC dry;intact  -CC     Closure Open to air  -BC Open to air  -CC     Drainage Amount -- none  -CC     Retired Wound - Properties Group Placement Date: 12/21/23  -MT Placement Time: 1844 -MT Location: abdomen  -MT Primary Wound Type: Incision  -MT, x5     Retired Wound - Properties Group Date first assessed: 12/21/23  -MT Time first assessed: 1844  -MT Location: abdomen  -MT Primary Wound Type: Incision  -MT, x5        Wound 12/21/23 2224 lower abdomen Incision    Wound - Properties Group Placement Date: 12/21/23  -HB Placement Time: 2224 -HB Orientation: lower  -HB Location: abdomen  -HB Primary Wound Type: Incision  -HB    Dressing Appearance dry;intact;open to air  -BC dry;intact  -CC     Closure -- Open to air  -CC     Drainage Amount -- none  -CC     Retired Wound - Properties Group Placement Date: 12/21/23  -HB Placement Time: 2224 -HB Orientation:  lower  -HB Location: abdomen  -HB Primary Wound Type: Incision  -HB    Retired Wound - Properties Group Date first assessed: 12/21/23  -HB Time first assessed: 2224  -HB Location: abdomen  -HB Primary Wound Type: Incision  -HB              User Key  (r) = Recorded By, (t) = Taken By, (c) = Cosigned By      Initials Name Provider Type    HB Courtney Graham RN Registered Nurse    Amrik Donato RN Registered Nurse    Belinda Lugo RN Registered Nurse    Iesha Celaya LPN Licensed Nurse    Khari Lyons LPN Licensed Nurse                      Assessment & Plan        acetaminophen, 1,000 mg, Oral, Q6H  cefTRIAXone, 2,000 mg, Intravenous, Daily  doxycycline, 100 mg, Intravenous, Q12H  escitalopram, 20 mg, Oral, Daily  levothyroxine, 25 mcg, Oral, Q AM  lisinopril, 10 mg, Oral, Daily  methocarbamol, 750 mg, Oral, 4x Daily  pantoprazole, 40 mg, Oral, Q AM  senna-docusate sodium, 2 tablet, Oral, BID  sodium chloride, 10 mL, Intravenous, Q12H       sodium chloride, 30 mL/hr, Last Rate: Stopped (12/25/23 1026)         Active Hospital Problems:  Active Hospital Problems    Diagnosis     **Perforated diverticulum     Mood disorder     Hypertension     Disease of thyroid gland     Tubo-ovarian mass        Assessment/plan:    Diverticulitis/tubo-ovarian abscess  -supportive care, pain control, Rocephin/doxycycline/Flagyl  -urine culture showed no growth  -S/p robotic cory colostomy with drainage of abscess and left salpingo-oophorectomy (12/21/2023), on  PCA pump for pain control  -Air in ostomy bag, INGRID drain and Yates catheter discontinued.  -She is tolerating her diet and ambulating well.  -Rest of management per surgical team.  -Continue out of bed to chair, incentive spirometer.        Hypertension  -Blood pressure at goal, continue lisinopril      Hypothyroidism  -Continue Synthroid      Depression   -Continue Lexapro          DVT prophylaxis: Full code  Continue patient level of care  Plan  discussed with patient            Electronically signed by Daniella Harris MD, 23, 14:46 EST.  [facility] Hospitalist Team             Electronically signed by Daniella Harris MD at 23 1449       Harvinder Vasquez MD at 23 1239          Colorectal Surgery Progress Note    Pt. Name/Age/:  Charity Mcclure   63 y.o.    1960         Med. Record Number:   [id number]  Date of admission:  2023      Service(s): Colorectal Surgery      Subjective    Doing well   Pain much improved  Out of bed and ambulating   Voiding freely   Ostomy with gas  Tolerating diet     Objective  Vitals:     Patient Vitals for the past 24 hrs:   BP Temp Temp src Pulse Resp SpO2   23 1219 109/59 98.4 °F (36.9 °C) Oral 92 18 97 %   23 0925 92/52 -- -- -- -- --   23 0500 118/72 97.8 °F (36.6 °C) Axillary -- 12 --   23 2100 -- 98 °F (36.7 °C) Oral -- 14 --           Wt. Admission: Weight: 70.2 kg (154 lb 12.2 oz)     Wt. Current: Weight: 70.2 kg (154 lb 12.2 oz)   Body mass index is 29.24 kg/m².      I&O:  Intake/Output Summary (Last 24 hours) at 2023 1239  Last data filed at 2023 1100  Gross per 24 hour   Intake 480 ml   Output 0 ml   Net 480 ml      1901 -  0700  In: 360 [P.O.:360]  Out: 0       Exam  General:  No acute distress, resting comfortably.  Cardiovascular: regular rate.  Respiratory: no increased work of breathing.  Abdomen:  Soft, appropriate incisional tenderness, non-distended. No diffuse guarding or rebound tenderness. Incisions c/d/I.drain SS, Ostomy with gas in the bag   Extremities: warm, well-perfused extremities, no pitting edema.          Scheduled Meds:acetaminophen, 1,000 mg, Oral, Q6H  cefTRIAXone, 2,000 mg, Intravenous, Daily  doxycycline, 100 mg, Intravenous, Q12H  escitalopram, 20 mg, Oral, Daily  levothyroxine, 25 mcg, Oral, Q AM  lisinopril, 10 mg, Oral, Daily  methocarbamol, 750 mg, Oral, 4x Daily  pantoprazole, 40 mg, Oral, Q  AM  senna-docusate sodium, 2 tablet, Oral, BID  sodium chloride, 10 mL, Intravenous, Q12H      Continuous Infusions:sodium chloride, 30 mL/hr, Last Rate: Stopped (12/25/23 1026)      PRN Meds:.  acetaminophen    senna-docusate sodium **AND** polyethylene glycol **AND** bisacodyl **AND** [DISCONTINUED] bisacodyl    Calcium Replacement - Follow Nurse / BPA Driven Protocol    HYDROmorphone    ketorolac    Magnesium Standard Dose Replacement - Follow Nurse / BPA Driven Protocol    naloxone    nitroglycerin    ondansetron **OR** ondansetron    ondansetron    oxyCODONE    Phosphorus Replacement - Follow Nurse / BPA Driven Protocol    Potassium Replacement - Follow Nurse / BPA Driven Protocol    sodium chloride    sodium chloride          Assessment  63 y.o. female s/p Robotic johnson colostomy, drainage of abscess, and left salpingo oophorectomy       Plan / Recommendations    - doing well   - minimize narcotics   - out of bed and ambulate as tolerated   - continue with diet,  - continue in patient until pain is controlled and ostomy functions   - antibiotics and duration per ID. Appreciate recommendations      Tentative discharge 12/26/2023      12:39 EST; 12/25/2023        Harvinder Vasquez MD  Colon and Rectal Surgery   [facility]        Electronically signed by Harvinder Vasquez MD at 12/25/23 1248          Nutrition Notes (last 24 hours)        Corin Mcdonough RD at 12/26/23 0713          Nutrition Services    Patient Name: Charity Mcclure  YOB: 1960  MRN: [id number]  Admission date: 12/16/2023    PPE Documentation        PPE Worn By Provider Did not enter room this encounter   PPE Worn By Patient  N/A       NUTRITION SCREENING      Encounter Information: Pt being assessed for LOS x 10 days. Pt admitted to [facility] with perforated diverticulum. Pt is s/p Han colostomy with drainage of abscess and left salpingo-oophorectomy on 12/21. Pt eating well since diet advanced past liquids.        PO Diet: Diet:  "Regular/House Diet; Texture: Regular Texture (IDDSI 7); Fluid Consistency: Thin (IDDSI 0)   PO Supplements: Boost Plus TID (provides 1080 kcals, 42 g protein if consumed)      PO Intake:  75% at meals since diet advanced past liquids       Labs (reviewed below): Reviewed, management per attending.         GI Function:  Ostomy: 600 mL in last 24 hours per I/O documentation        Skin: Intact        Weight Hx Review: 12/26: 154# - scale  No wt hx to review       Nutrition Intervention: Continue current diet as tolerated. Continue ONS. Encourage good PO intake.        Results from last 7 days   Lab Units 12/26/23  0207 12/25/23  1104 12/24/23  2308 12/24/23  1014   SODIUM mmol/L 140  --  137 138   POTASSIUM mmol/L 4.3 3.9 3.4* 3.7   CHLORIDE mmol/L 107  --  101 102   CO2 mmol/L 26.0  --  25.0 25.0   BUN mg/dL 12  --  7* 8   CREATININE mg/dL 0.57  --  0.41* 0.46*   CALCIUM mg/dL 8.0*  --  8.1* 8.1*   BILIRUBIN mg/dL 0.2  --  0.2 0.2   ALK PHOS U/L 80  --  69 82   ALT (SGPT) U/L 10  --  12 10   AST (SGOT) U/L 21  --  19 22   GLUCOSE mg/dL 116*  --  104* 102*     Results from last 7 days   Lab Units 12/26/23  0207 12/24/23  2308   MAGNESIUM mg/dL 1.7 1.7   PHOSPHORUS mg/dL 2.3* 2.4*   HEMOGLOBIN g/dL 8.0* 7.7*   HEMATOCRIT % 24.2* 23.6*     No results found for: \"COVID19\"  No results found for: \"HGBA1C\"    RD to follow up per protocol.    Electronically signed by:  Corin Mcdonough RD  12/26/23 07:13 EST      Electronically signed by Corin Mcdonough RD at 12/26/23 0718       "

## 2023-12-26 NOTE — CASE MANAGEMENT/SOCIAL WORK
Continued Stay Note  [facility]     Patient Name: Charity Mcclure  MRN: [id number]  Today's Date: 12/26/2023    Admit Date: 12/16/2023    Plan: Referral to [facility] pending acceptance.  Will need  order.  Family can transport at discharge.   Discharge Plan       Row Name 12/26/23 1338       Plan    Plan Referral to [facility] pending acceptance.  Will need HH order.  Family can transport at discharge.    Plan Comments Referral to  pending acceptance.                      Expected Discharge Date and Time       Expected Discharge Date Expected Discharge Time    Dec 26, 2023               Sindy Hebert RN

## 2023-12-26 NOTE — PLAN OF CARE
Goal Outcome Evaluation:              Outcome Evaluation: Pt AOx4 with minimal c/o pain. Pain treated per MAR. Colostomy producing a creamy stool. Pt up ad alexus, VSS, able to make needs known. Will continue to monitor.

## 2023-12-26 NOTE — DISCHARGE SUMMARY
[facility] Medicine Services  Discharge Summary    Date of Service: 2023  Patient Name: Charity Mcclure  : 1960  MRN: [id number]    Date of Admission: 2023  Discharge Diagnosis:   Diverticulitis  Tubo-ovarian abscess  Hypertension  Hypothyroidism  Depression      Date of Discharge:  2023  Primary Care Physician: Meron Whittaker MD      Presenting Problem:   Perforated diverticulum [K57.80]    Active and Resolved Hospital Problems:  Active Hospital Problems    Diagnosis POA    **Perforated diverticulum [K57.80] Yes    Mood disorder [F39] Yes    Hypertension [I10] Yes    Disease of thyroid gland [E07.9] Yes    Tubo-ovarian mass [N94.89] Yes      Resolved Hospital Problems   No resolved problems to display.         Hospital Course     Hospital Course:  This is a 63-year-old hypertension, hypothyroidism, hypertension, mood disorder, and right hip repair.  Patient was admitted on 2023  presented to [facility] ED, complaining of abdominal pain with subjective fever chills and malaise.  Patient initially thought she might have had a flu.  However, she continues to feel bad and developed nonbloody diarrhea, and poor oral intake.    Was initially seen at [facility] ED and CT of the abdomen/pelvis at that facility revealed perforated diverticulum with tubo-ovarian mass, concerning for possible abscess. Laboratories notable for leukocytosis with WBC of 21 K,     CT of the abdomen/pelvis with contrast (), showed a left anterior pelvis 6.5 x 6.2 cm irregular rim enhancing fluid collection/cystic mass with surrounding inflammatory changes, and imaging features suggest infection such as tubal ovarian abscess or previous bout of perforated diverticulitis with resultant abscess formation.        She is admitted with principal diagnosis of suspected diverticulitis/tubo-ovarian abscess.  She was seen in consultation by OB/GYN and general surgery and she is status post  robotic Han colostomy with drainage of abscess and left salpingo-oophorectomy on 12/21/2023,   Postoperatively patient remained hemodynamically stable, pain well-controlled and ostomy working well with stool in her bag.  She was followed continuously by colorectal surgery in the hospital and was treated with IV antibiotics.    On discharge patient is sent home with 9 days of Augmentin twice daily to complete 14 days of antibiotic therapy from postoperative day 1.  She is to follow-up with her PCP as well as colorectal surgery as an outpatient.              DISCHARGE Follow Up Recommendations for labs and diagnostics:   None      Reasons For Change In Medications and Indications for New Medications:      Day of Discharge     Vital Signs:  Temp:  [98.1 °F (36.7 °C)-98.2 °F (36.8 °C)] 98.2 °F (36.8 °C)  Heart Rate:  [77-80] 80  Resp:  [17-21] 17  BP: (102-144)/(58-82) 125/72    Physical Exam:  Appearance: No apparent distress, non-toxic appearing   HEENT/Neck: Neck is supple, Extraocular movements intact, Moist mucous membranes,   Cardiovascular: Regular Rate and Rhythm, No murmurs, gallops or rubs   Pulmonary: Clear to auscultation Bilaterally.   Abdomen: BS+, Soft, non-distended, ostomy bag in place with stool in the bag, incision with dressing that appears clean.    Ext: No Cyanosis, Clubbing, Edema.  Neuro: Non-focal, Alert & Oriented x 3      Pertinent  and/or Most Recent Results     LAB RESULTS:      Lab 12/26/23  0207 12/24/23  2308 12/24/23  0430 12/23/23  0335 12/22/23  0943 12/22/23  0434   WBC 14.80* 16.90* 17.10* 19.90*  --  26.10*   HEMOGLOBIN 8.0* 7.7* 7.6* 7.9*  --  9.4*   HEMATOCRIT 24.2* 23.6* 23.4* 24.6*  --  28.9*   PLATELETS 438 425 364 379  --  452*   NEUTROS ABS 11.40* 13.80* 14.60* 17.40*  --  24.50*   LYMPHS ABS 2.00 2.00 1.70 1.40  --  0.60*   MONOS ABS 1.20* 1.00* 0.70 1.10*  --  1.00*   EOS ABS 0.10 0.00 0.00 0.00  --  0.00   MCV 90.2 90.2 90.3 91.2  --  91.2   PROCALCITONIN  --   --    --   --  1.61*  --          Lab 12/26/23  0207 12/25/23  1104 12/24/23  2308 12/24/23  1014 12/23/23  0335 12/22/23  0434   SODIUM 140  --  137 138 134* 137   POTASSIUM 4.3 3.9 3.4* 3.7 3.7 4.0   CHLORIDE 107  --  101 102 101 101   CO2 26.0  --  25.0 25.0 28.0 20.0*   ANION GAP 7.0  --  11.0 11.0 5.0 16.0*   BUN 12  --  7* 8 7* 7*   CREATININE 0.57  --  0.41* 0.46* 0.49* 0.57   EGFR 102.3  --  110.7 107.7 106.1 102.3   GLUCOSE 116*  --  104* 102* 115* 189*   CALCIUM 8.0*  --  8.1* 8.1* 7.9* 8.4*   MAGNESIUM 1.7  --  1.7  --   --   --    PHOSPHORUS 2.3*  --  2.4*  --   --   --          Lab 12/26/23  0207 12/24/23  2308 12/24/23  1014 12/23/23  0335 12/22/23  0434   TOTAL PROTEIN 5.0* 5.1* 5.5* 5.2* 5.9*   ALBUMIN 2.3* 2.3* 2.4* 2.3* 2.6*   GLOBULIN 2.7 2.8 3.1 2.9 3.3   ALT (SGPT) 10 12 10 7 10   AST (SGOT) 21 19 22 11 14   BILIRUBIN 0.2 0.2 0.2 0.2 0.4   ALK PHOS 80 69 82 64 77                     Brief Urine Lab Results  (Last result in the past 365 days)        Color   Clarity   Blood   Leuk Est   Nitrite   Protein   CREAT   Urine HCG        12/16/23 0549 Yellow   Cloudy   Small (1+)   Small (1+)   Negative   Trace                 Microbiology Results (last 10 days)       ** No results found for the last 240 hours. **            CT Abdomen Pelvis With Contrast    Result Date: 12/19/2023  Impression: Impression: 1.There is a left anterior pelvic 6.5 x 6.2 cm irregular rim-enhancing fluid collection/cystic mass with surrounding inflammatory change. Imaging features suggest infection such as tubo-ovarian abscess or previous bout of perforated diverticulitis with resultant abscess formation. No internal gas to confirm infection. However, left ovarian neoplasm is a consideration. Surgical consultation recommended. Follow-up imaging post therapy recommended to ensure resolution. Cystic mass is similar in size compared to previous examination with resolution of previous free fluid. 2.There is mural thickening involving  the mid to distal sigmoid colon related to the previously mentioned pelvic fluid collection/cystic mass. Electronically Signed: Alber Mercedes MD  12/19/2023 12:49 PM CST  Workstation ID: [id number]    XR Abdomen KUB    Result Date: 12/16/2023  Impression: Impression: No acute process identified within the abdomen. Electronically Signed: Tali Mercedes MD  12/16/2023 8:50 AM CST  Workstation ID: [id number]                 Labs Pending at Discharge:  Pending Labs       Order Current Status    Tissue Pathology Exam In process            Procedures Performed  Procedure(s):  left SALPINGOOPHORECTOMY,  cysto WITH DAVINCI ROBOT, sigmoid resection, drainage of abcess, creation of colostomy         Consults:   Consults       Date and Time Order Name Status Description    12/16/2023  1:44 AM Inpatient Gynecology Consult Completed     12/16/2023  1:44 AM Inpatient Colorectal Surgery Consult                Discharge Details        Discharge Medications        New Medications        Instructions Start Date   amoxicillin-clavulanate 875-125 MG per tablet  Commonly known as: AUGMENTIN   1 tablet, Oral, 2 Times Daily      pantoprazole 40 MG EC tablet  Commonly known as: PROTONIX   40 mg, Oral, Every Early Morning   Start Date: December 27, 2023     saccharomyces boulardii 250 MG capsule  Commonly known as: Florastor   250 mg, Oral, 2 Times Daily             Continue These Medications        Instructions Start Date   escitalopram 20 MG tablet  Commonly known as: LEXAPRO   20 mg, Oral, Daily      levothyroxine 25 MCG tablet  Commonly known as: SYNTHROID, LEVOTHROID   25 mcg, Oral, Every Early Morning      lisinopril 10 MG tablet  Commonly known as: PRINIVIL,ZESTRIL   10 mg, Oral, Daily               No Known Allergies      Discharge Disposition:   Home or Self Care    Diet:  Hospital:  Diet Order   Procedures    Diet: Regular/House Diet; Texture: Regular Texture (IDDSI 7); Fluid Consistency: Thin (IDDSI 0)         Discharge Activity:    Activity Instructions       Activity as Tolerated                CODE STATUS:  Code Status and Medical Interventions:   Ordered at: 12/22/23 0104     Level Of Support Discussed With:    Patient     Code Status (Patient has no pulse and is not breathing):    CPR (Attempt to Resuscitate)     Medical Interventions (Patient has pulse or is breathing):    Full         No future appointments.    Additional Instructions for the Follow-ups that You Need to Schedule       Discharge Follow-up with PCP   As directed       Currently Documented PCP:    Meron Whittaker MD    PCP Phone Number:    [phone]     Follow Up Details: Routine PCP follow-up        Discharge Follow-up with Specified Provider: Colorectal surgery; 2 Weeks   As directed      To: Colorectal surgery   Follow Up: 2 Weeks                Time spent on Discharge including face to face service:  >30 minutes    Signature: Electronically signed by Daniella Harris MD, 12/26/23, 13:06 EST.  [facility] Hospitalist Team

## 2023-12-26 NOTE — NURSING NOTE
63-year-old female who was admitted to the hospital on December 16 with perforated diverticulum.  Patient is status post a robotic sigmoid colon resection with a Rodriguez's colostomy and drainage of abscess and left salpingo  oophorectomy.    Patient is awake and alert.  She reports that the ostomy appliance leaked earlier this morning and that staff replaced it.  Comprehensive education is performed with the patient.  Patient's abdomen is large and I can see some soft contouring to the abdomen.  The current pouching system which staff just applied looks good.  She is currently in a 1 piece pouching system with an adapt ring.  I educated patient on how to empty when to empty.  I educated patient on odor control.  I educated the patient on hygiene, bathing, clothing diet and hydration.  She verbalizes understanding.  I also went over how to order supplies.  I also went through the ostomy folder with her.  She verbalizes understanding all questions are answered.  Patient to have home health.  Will continue to follow

## 2023-12-26 NOTE — CASE MANAGEMENT/SOCIAL WORK
Continued Stay Note  [facility]     Patient Name: Charity Mcclure  MRN: [id number]  Today's Date: 12/26/2023    Admit Date: 12/16/2023    Plan: Home with [facility]. Will need order.  Family can transport at discharge.   Discharge Plan       Row Name 12/26/23 1505       Plan    Plan Home with [facility]. Will need order.  Family can transport at discharge.    Patient/Family in Agreement with Plan yes    Plan Comments Accepted with [facility]                   Expected Discharge Date and Time       Expected Discharge Date Expected Discharge Time    Dec 26, 2023               Sindy Hebert RN

## 2023-12-26 NOTE — PLAN OF CARE
Goal Outcome Evaluation:  Plan of Care Reviewed With: patient        Progress: improving  Outcome Evaluation: Pt with some complaints of pain this shift, colostomy bag changed and pt was seen by ostomy nurse prior to discharge. Pt up ad alexus, VSS, no concerns at this time

## 2023-12-26 NOTE — CONSULTS
"Nutrition Services    Patient Name: Charity Mcclure  YOB: 1960  MRN: [id number]  Admission date: 12/16/2023    PPE Documentation        PPE Worn By Provider Did not enter room this encounter   PPE Worn By Patient  N/A       NUTRITION SCREENING      Encounter Information: Pt being assessed for LOS x 10 days. Pt admitted to [facility] with perforated diverticulum. Pt is s/p Han colostomy with drainage of abscess and left salpingo-oophorectomy on 12/21. Pt eating well since diet advanced past liquids.        PO Diet: Diet: Regular/House Diet; Texture: Regular Texture (IDDSI 7); Fluid Consistency: Thin (IDDSI 0)   PO Supplements: Boost Plus TID (provides 1080 kcals, 42 g protein if consumed)      PO Intake:  75% at meals since diet advanced past liquids       Labs (reviewed below): Reviewed, management per attending.         GI Function:  Ostomy: 600 mL in last 24 hours per I/O documentation        Skin: Intact        Weight Hx Review: 12/26: 154# - scale  No wt hx to review       Nutrition Intervention: Continue current diet as tolerated. Continue ONS. Encourage good PO intake.        Results from last 7 days   Lab Units 12/26/23  0207 12/25/23  1104 12/24/23  2308 12/24/23  1014   SODIUM mmol/L 140  --  137 138   POTASSIUM mmol/L 4.3 3.9 3.4* 3.7   CHLORIDE mmol/L 107  --  101 102   CO2 mmol/L 26.0  --  25.0 25.0   BUN mg/dL 12  --  7* 8   CREATININE mg/dL 0.57  --  0.41* 0.46*   CALCIUM mg/dL 8.0*  --  8.1* 8.1*   BILIRUBIN mg/dL 0.2  --  0.2 0.2   ALK PHOS U/L 80  --  69 82   ALT (SGPT) U/L 10  --  12 10   AST (SGOT) U/L 21  --  19 22   GLUCOSE mg/dL 116*  --  104* 102*     Results from last 7 days   Lab Units 12/26/23  0207 12/24/23  2308   MAGNESIUM mg/dL 1.7 1.7   PHOSPHORUS mg/dL 2.3* 2.4*   HEMOGLOBIN g/dL 8.0* 7.7*   HEMATOCRIT % 24.2* 23.6*     No results found for: \"COVID19\"  No results found for: \"HGBA1C\"    RD to follow up per protocol.    Electronically signed by:  Corin Mcdonough, " RD  12/26/23 07:13 EST

## 2023-12-26 NOTE — CASE MANAGEMENT/SOCIAL WORK
Case Management Discharge Note      Final Note: HOME WITH [facility]    Provided Post Acute Provider List?: N/A  N/A Provider List Comment: denies dc needs  Provided Post Acute Provider Quality & Resource List?: N/A    Selected Continued Care - Discharged on 12/26/2023 Admission date: 12/16/2023 - Discharge disposition: Home or Self Care          Home Medical Care Coordination complete.      Service Provider Selected Services Address Phone Fax Patient Preferred    [facility] [address] [place] [address], [facility] [address] [id number] [phone] --                      Transportation Services  Private: Car    Final Discharge Disposition Code: 06 - home with home health care

## 2023-12-27 NOTE — OUTREACH NOTE
Prep Survey      Flowsheet Row Responses   [facility] facility patient discharged from? [facility]   Is LACE score < 7 ? No   Eligibility Readm Mgmt   Discharge diagnosis Salpingoophorectomy, sigmoid resection, creation of colostomy   Does the patient have one of the following disease processes/diagnoses(primary or secondary)? General Surgery   Does the patient have Home health ordered? Yes   What is the Home health agency?  [facility]   Is there a DME ordered? No   Prep survey completed? Yes            MOLLY FERGUSON - Registered Nurse

## 2024-01-02 ENCOUNTER — READMISSION MANAGEMENT (OUTPATIENT)
Dept: CALL CENTER | Facility: HOSPITAL | Age: 64
End: 2024-01-02
Payer: COMMERCIAL

## 2024-01-02 NOTE — OUTREACH NOTE
General Surgery Week 1 Survey      Flowsheet Row Responses   [facility] patient discharged from? [facility]   Does the patient have one of the following disease processes/diagnoses(primary or secondary)? General Surgery   Week 1 attempt successful? Yes   Call start time 1700   Call end time 1704   Meds reviewed with patient/caregiver? Yes   Is the patient having any side effects they believe may be caused by any medication additions or changes? No   Does the patient have all medications related to this admission filled (includes all antibiotics, pain medications, etc.) Yes   Is the patient taking all medications as directed (includes completed medication regime)? Yes   Does the patient have a follow up appointment scheduled with their surgeon? Yes   Has the patient kept scheduled appointments due by today? N/A   Comments Surgeon appt 01/04/23.   Has home health visited the patient within 72 hours of discharge? Yes   Home health comments HH nurse visited today.   DME comments Has colostomy.   Psychosocial issues? No   Did the patient receive a copy of their discharge instructions? Yes   Nursing interventions Reviewed instructions with patient   What is the patient's perception of their health status since discharge? Improving   Nursing interventions Nurse provided patient education   Is the patient /caregiver able to teach back basic post-op care? Continue use of incentive spirometry at least 1 week post discharge, Practice 'cough and deep breath', Drive as instructed by MD in discharge instructions, Take showers only when approved by MD-sponge bathe until then, No tub bath, swimming, or hot tub until instructed by MD, Keep incision areas clean,dry and protected, Do not remove steri-strips, Lifting as instructed by MD in discharge instructions   Is the patient/caregiver able to teach back signs and symptoms of incisional infection? Increased redness, swelling or pain at the incisonal site, Increased drainage or  bleeding, Incisional warmth, Pus or odor from incision, Fever   Is the patient/caregiver able to teach back steps to recovery at home? Set small, achievable goals for return to baseline health, Rest and rebuild strength, gradually increase activity, Practice good oral hygiene, Eat a well-balance diet   If the patient is a current smoker, are they able to teach back resources for cessation? Not a smoker   Is the patient/caregiver able to teach back the hierarchy of who to call/visit for symptoms/problems? PCP, Specialist, Home health nurse, Urgent Care, ED, 911 Yes   Week 1 call completed? Yes   Graduated Yes   Is the patient interested in additional calls from an ambulatory ? No   Would this patient benefit from a Referral to [facility] Social Work? No   Wrap up additional comments Patient states is improving. States  nurse visited today. Denies any problems with colostomy. Denies any s/s of infection. Reports good appetite. Denies any needs/concerns today.   Call end time 1704            China NATHAN - Registered Nurse

## 2024-01-04 ENCOUNTER — OFFICE VISIT (OUTPATIENT)
Age: 64
End: 2024-01-04
Payer: COMMERCIAL

## 2024-01-04 VITALS
HEIGHT: 61 IN | DIASTOLIC BLOOD PRESSURE: 87 MMHG | WEIGHT: 156.8 LBS | BODY MASS INDEX: 29.6 KG/M2 | HEART RATE: 77 BPM | OXYGEN SATURATION: 98 % | TEMPERATURE: 97.8 F | SYSTOLIC BLOOD PRESSURE: 132 MMHG

## 2024-01-04 DIAGNOSIS — K57.20 DIVERTICULITIS OF COLON WITH PERFORATION: Primary | ICD-10-CM

## 2024-01-04 PROCEDURE — 99024 POSTOP FOLLOW-UP VISIT: CPT | Performed by: STUDENT IN AN ORGANIZED HEALTH CARE EDUCATION/TRAINING PROGRAM

## 2024-01-04 RX ORDER — ACETAMINOPHEN 500 MG
500 TABLET ORAL EVERY 6 HOURS PRN
COMMUNITY

## 2024-01-04 NOTE — PROGRESS NOTES
Colorectal Surgery Followup Note    ID:  Charity Mcclure;   : 1960  DATE OF VISIT: 2024    Chief Complaint  Post-op Follow-up (Post op 23 Robotic sigmoid resection w/ Hartmans colostomy w/ drainage of pelvic abscess & washout )       Subjective    Patient is s/p robotic sigmoid resection with end colostomy with left adnexal resection for perforated diverticulitis with abscess. Discussed Pathology reports. She is doing great and recovering well. She has been going to grocery stores and doing activities at home. She has good appetite and ostomy has been functioning. She has minimal pain and is not taking any pain meds. Denies any fever or chills.     Exam  General:  No acute distress  Head: Normocephalic, atraumatic  Neuro: Alert and oriented    Abdomen:  Soft, non-tender, non-distended, no hernias, no hepatomegaly, no splenomegaly. No abnormal, audible bowel sounds.Ostomy viable and functioning. Staples removed.     Assessment  - complicated diverticulitis   - Rodriguez's colostomy     Plan / Recommendations  -Recovering very well. Discussed milestones for post operative course  - continue restriction with weight lifting   - no diet restrictions  - continue with supplemental fiber   - follow up a month from now for another post op check.       Harvinder Vasquez MD  Colon and Rectal Surgery   [facility]

## 2024-02-06 ENCOUNTER — TELEPHONE (OUTPATIENT)
Age: 64
End: 2024-02-06
Payer: COMMERCIAL

## 2024-02-06 NOTE — TELEPHONE ENCOUNTER
Received FMLA/ STD papers for neida. The fax was incomplete and repeating pages or error. I have called patient to discuss papers and called kathryn and asked for a new copy to fill out.

## 2024-02-07 ENCOUNTER — OFFICE VISIT (OUTPATIENT)
Age: 64
End: 2024-02-07
Payer: COMMERCIAL

## 2024-02-07 VITALS
DIASTOLIC BLOOD PRESSURE: 102 MMHG | HEIGHT: 61 IN | TEMPERATURE: 97.5 F | SYSTOLIC BLOOD PRESSURE: 158 MMHG | BODY MASS INDEX: 30.96 KG/M2 | WEIGHT: 164 LBS | HEART RATE: 65 BPM | OXYGEN SATURATION: 97 %

## 2024-02-07 DIAGNOSIS — Z93.3 COLOSTOMY IN PLACE: ICD-10-CM

## 2024-02-07 DIAGNOSIS — K57.20 DIVERTICULITIS OF COLON WITH PERFORATION: Primary | ICD-10-CM

## 2024-02-07 PROCEDURE — 99024 POSTOP FOLLOW-UP VISIT: CPT | Performed by: STUDENT IN AN ORGANIZED HEALTH CARE EDUCATION/TRAINING PROGRAM

## 2024-02-07 RX ORDER — ACETAMINOPHEN 500 MG
1000 TABLET ORAL EVERY 6 HOURS
OUTPATIENT
Start: 2024-02-07

## 2024-02-07 RX ORDER — NEOMYCIN SULFATE 500 MG/1
1000 TABLET ORAL 3 TIMES DAILY
Qty: 6 TABLET | Refills: 0 | Status: SHIPPED | OUTPATIENT
Start: 2024-02-07 | End: 2024-02-08

## 2024-02-07 RX ORDER — CHLORHEXIDINE GLUCONATE 4 G/100ML
SOLUTION TOPICAL 2 TIMES DAILY
Qty: 236 ML | Refills: 0 | Status: SHIPPED | OUTPATIENT
Start: 2024-02-07

## 2024-02-07 RX ORDER — PREGABALIN 75 MG/1
75 CAPSULE ORAL ONCE
OUTPATIENT
Start: 2024-02-07 | End: 2024-02-07

## 2024-02-07 RX ORDER — ALVIMOPAN 12 MG/1
12 CAPSULE ORAL ONCE
OUTPATIENT
Start: 2024-02-07 | End: 2024-02-07

## 2024-02-07 RX ORDER — SODIUM CHLORIDE, SODIUM LACTATE, POTASSIUM CHLORIDE, CALCIUM CHLORIDE 600; 310; 30; 20 MG/100ML; MG/100ML; MG/100ML; MG/100ML
30 INJECTION, SOLUTION INTRAVENOUS CONTINUOUS
OUTPATIENT
Start: 2024-02-07

## 2024-02-07 RX ORDER — HEPARIN SODIUM 5000 [USP'U]/ML
5000 INJECTION, SOLUTION INTRAVENOUS; SUBCUTANEOUS ONCE
OUTPATIENT
Start: 2024-02-07 | End: 2024-02-07

## 2024-02-07 RX ORDER — ERYTHROMYCIN 500 MG/1
1000 TABLET, COATED ORAL 3 TIMES DAILY
Qty: 6 TABLET | Refills: 0 | Status: SHIPPED | OUTPATIENT
Start: 2024-02-07 | End: 2024-02-08

## 2024-02-07 NOTE — PROGRESS NOTES
Colorectal Surgery Followup Note    ID:  Charity Mcclure;   : 1960  DATE OF VISIT: 2024    Chief Complaint  Post-op Follow-up (1 mon post op follow up, s/p Sigmoid colectomy 2023)       Subjective  Subjective     Patient is s/p robotic sigmoid resection with end colostomy with left adnexal resection for perforated diverticulitis with abscess. She is doing great and recovering well. She has been doing activities at home. She has good appetite and ostomy has been functioning. She has no pain and is not taking any pain meds. Denies any fever or chills.      Exam  General:  No acute distress  Head: Normocephalic, atraumatic  Neuro: Alert and oriented     Abdomen:  Soft, non-tender, non-distended, no hernias, no hepatomegaly, no splenomegaly. No abnormal, audible bowel sounds.Ostomy viable and functioning. Staples removed.      Assessment  - complicated diverticulitis   - Rodriguez's colostomy      Plan / Recommendations  -Recovering very well. Discussed milestones for post operative course  - no diet restrictions  - continue with supplemental fiber   - plan for colostomy closure in April   - She will need colonoscopy prior to procedure given she is over 10 years since her last colonoscopy         Harvinder Vasquez MD  Colon and Rectal Surgery   Jody Vasquez MD  Colon and Rectal Surgery   [facility]

## 2024-02-09 NOTE — TELEPHONE ENCOUNTER
Complete fax received from [place] and patient signed release. Per rut Cotton for patient to be of up to the next surgery 4/16/2024. Patient will have to have new paperwork for after that surgery and be off for 6 weeks after the April 2024 surgery.

## 2024-02-19 ENCOUNTER — TELEPHONE (OUTPATIENT)
Age: 64
End: 2024-02-19
Payer: COMMERCIAL

## 2024-02-19 NOTE — TELEPHONE ENCOUNTER
Call back to patient, advised that I had faxed the forms we had from [employer] to a Monik, the person who faxed the forms to us, on 2/13/2024 with a confirmation. Advised was happy to refax if she needed. Patient thinks that it could be that Monik has not spoken with the person at her employer. Advised that if her employer needed a separate set of paperwork we did not have that. Okay per patient will contact her employer. Thanked us for calling.

## 2024-02-19 NOTE — TELEPHONE ENCOUNTER
"Caller: Mcclure, Charity    Relationship: Self    Best call back number: [phone]     What form or medical record are you requesting: DISABILITY    Who is requesting this form or medical record from you: EMPLOYER    How would you like to receive the form or medical records (pick-up, mail, fax): FAX    ON FORMS, PT DOES NOT HAVE    Timeframe paperwork needed: ASAP    Additional notes: PT STATES EMPLOYER ONLY RECEIVED 1/2 OF THE PW NEEDED. PLS RESEND. FAX SHOULD BE ON FORMS POSSIBLY TO A \"[employer]\" DATE SHOULD ALSO SHOW NEXT SURG/PROC DATE  "

## 2024-02-21 ENCOUNTER — TELEPHONE (OUTPATIENT)
Age: 64
End: 2024-02-21
Payer: COMMERCIAL

## 2024-02-21 NOTE — TELEPHONE ENCOUNTER
Call from patient stating that her employer is trying to get her paid from her disability however they have not received anything from [employer] [facility] [employer] [facility] to do this. States she was advised by her HR dept that the person we sent the paperwork to at [place] was not the person who is supposed to be handling the patient claim. Advised that we have to return the paperwork back to the same person who requests it and we did get a confirmation that the paperwork went through on 2/13/2024. States her HR dept would like to have that sent to them so they can process her payment. States it can be emailed to Gloria Torres at the following email address: [email or web address]  Confirmed email address by reading back to patient.   Advised I would double check with my clinic coordinator to be sure we can email that and if there are any issues I would reach back out to her. Also advised that I would reach back out to Monik at [employer] [facility] to make sure she received the paperwork as well. Patient expressed understanding of all discussed and thanked us for our help.     Call placed to Monik Caruso, [employer] [facility] [employer] [facility], left message on machine advising I wanted to confirm she received the disability paperwork on the patient I faxed on 2/13/2024. Asked that she call me back if she had not received the paperwork and I would refax to her. Left my contact information.    Per Clinic coordinator, okay to email paperwork to patient's employer. Paperwork given to her to email securely as per patient's request to above email address.

## 2024-04-04 ENCOUNTER — HOSPITAL ENCOUNTER (OUTPATIENT)
Dept: CARDIOLOGY | Facility: HOSPITAL | Age: 64
Discharge: HOME | End: 2024-04-04
Payer: COMMERCIAL

## 2024-04-04 ENCOUNTER — LAB (OUTPATIENT)
Dept: LAB | Facility: HOSPITAL | Age: 64
End: 2024-04-04
Payer: COMMERCIAL

## 2024-04-04 DIAGNOSIS — Z93.3 COLOSTOMY IN PLACE: ICD-10-CM

## 2024-04-04 LAB
ABO GROUP BLD: NORMAL
ANION GAP SERPL CALCULATED.3IONS-SCNC: 11 MMOL/L (ref 5–15)
BLD GP AB SCN SERPL QL: NEGATIVE
BUN SERPL-MCNC: 10 MG/DL (ref 8–23)
BUN/CREAT SERPL: 13.9 (ref 7–25)
CALCIUM SPEC-SCNC: 9.4 MG/DL (ref 8.6–10.5)
CHLORIDE SERPL-SCNC: 106 MMOL/L (ref 98–107)
CO2 SERPL-SCNC: 25 MMOL/L (ref 22–29)
CREAT SERPL-MCNC: 0.72 MG/DL (ref 0.57–1)
DEPRECATED RDW RBC AUTO: 44.6 FL (ref 37–54)
EGFRCR SERPLBLD CKD-EPI 2021: 93.5 ML/MIN/1.73
ERYTHROCYTE [DISTWIDTH] IN BLOOD BY AUTOMATED COUNT: 14.3 % (ref 12.3–15.4)
GLUCOSE SERPL-MCNC: 103 MG/DL (ref 65–99)
HCT VFR BLD AUTO: 39.3 % (ref 34–46.6)
HGB BLD-MCNC: 12.6 G/DL (ref 12–15.9)
MCH RBC QN AUTO: 27.6 PG (ref 26.6–33)
MCHC RBC AUTO-ENTMCNC: 32.1 G/DL (ref 31.5–35.7)
MCV RBC AUTO: 86 FL (ref 79–97)
PLATELET # BLD AUTO: 297 10*3/MM3 (ref 140–450)
PMV BLD AUTO: 10.3 FL (ref 6–12)
POTASSIUM SERPL-SCNC: 4.1 MMOL/L (ref 3.5–5.2)
QT INTERVAL: 367 MS
QTC INTERVAL: 403 MS
RBC # BLD AUTO: 4.57 10*6/MM3 (ref 3.77–5.28)
RH BLD: POSITIVE
SODIUM SERPL-SCNC: 142 MMOL/L (ref 136–145)
T&S EXPIRATION DATE: NORMAL
WBC NRBC COR # BLD AUTO: 8.43 10*3/MM3 (ref 3.4–10.8)

## 2024-04-04 PROCEDURE — 36415 COLL VENOUS BLD VENIPUNCTURE: CPT

## 2024-04-04 PROCEDURE — 86901 BLOOD TYPING SEROLOGIC RH(D): CPT

## 2024-04-04 PROCEDURE — 93005 ELECTROCARDIOGRAM TRACING: CPT | Performed by: STUDENT IN AN ORGANIZED HEALTH CARE EDUCATION/TRAINING PROGRAM

## 2024-04-04 PROCEDURE — 85027 COMPLETE CBC AUTOMATED: CPT

## 2024-04-04 PROCEDURE — 86850 RBC ANTIBODY SCREEN: CPT

## 2024-04-04 PROCEDURE — 86900 BLOOD TYPING SEROLOGIC ABO: CPT

## 2024-04-04 PROCEDURE — 80048 BASIC METABOLIC PNL TOTAL CA: CPT

## 2024-04-04 RX ORDER — PANTOPRAZOLE SODIUM 20 MG/1
20 TABLET, DELAYED RELEASE ORAL NIGHTLY
COMMUNITY
Start: 2024-02-19

## 2024-04-04 RX ORDER — NEOMYCIN SULFATE 500 MG/1
500 TABLET ORAL 4 TIMES DAILY
COMMUNITY
Start: 2024-02-08

## 2024-04-04 RX ORDER — ERYTHROMYCIN 500 MG/1
500 TABLET, COATED ORAL
COMMUNITY
Start: 2024-02-08

## 2024-04-04 RX ORDER — MULTIPLE VITAMINS W/ MINERALS TAB 9MG-400MCG
1 TAB ORAL DAILY
COMMUNITY

## 2024-04-15 ENCOUNTER — ANESTHESIA EVENT (OUTPATIENT)
Dept: GASTROENTEROLOGY | Facility: HOSPITAL | Age: 64
End: 2024-04-15
Payer: COMMERCIAL

## 2024-04-15 ENCOUNTER — ANESTHESIA (OUTPATIENT)
Dept: GASTROENTEROLOGY | Facility: HOSPITAL | Age: 64
End: 2024-04-15
Payer: COMMERCIAL

## 2024-04-15 ENCOUNTER — HOSPITAL ENCOUNTER (OUTPATIENT)
Facility: HOSPITAL | Age: 64
Setting detail: HOSPITAL OUTPATIENT SURGERY
Discharge: HOME | End: 2024-04-15
Attending: STUDENT IN AN ORGANIZED HEALTH CARE EDUCATION/TRAINING PROGRAM | Admitting: STUDENT IN AN ORGANIZED HEALTH CARE EDUCATION/TRAINING PROGRAM
Payer: COMMERCIAL

## 2024-04-15 VITALS
RESPIRATION RATE: 16 BRPM | SYSTOLIC BLOOD PRESSURE: 117 MMHG | HEIGHT: 61 IN | OXYGEN SATURATION: 96 % | WEIGHT: 163.4 LBS | HEART RATE: 74 BPM | TEMPERATURE: 98.1 F | DIASTOLIC BLOOD PRESSURE: 79 MMHG | BODY MASS INDEX: 30.85 KG/M2

## 2024-04-15 DIAGNOSIS — K57.20 DIVERTICULITIS OF COLON WITH PERFORATION: ICD-10-CM

## 2024-04-15 DIAGNOSIS — Z93.3 COLOSTOMY IN PLACE: ICD-10-CM

## 2024-04-15 PROCEDURE — 44389 COLONOSCOPY WITH BIOPSY: CPT | Performed by: STUDENT IN AN ORGANIZED HEALTH CARE EDUCATION/TRAINING PROGRAM

## 2024-04-15 PROCEDURE — 25810000003 SODIUM CHLORIDE 0.9 % SOLUTION

## 2024-04-15 PROCEDURE — 88305 TISSUE EXAM BY PATHOLOGIST: CPT | Performed by: STUDENT IN AN ORGANIZED HEALTH CARE EDUCATION/TRAINING PROGRAM

## 2024-04-15 PROCEDURE — 25010000002 PROPOFOL 1000 MG/100ML EMULSION

## 2024-04-15 RX ORDER — SODIUM CHLORIDE 9 MG/ML
INJECTION, SOLUTION INTRAVENOUS CONTINUOUS PRN
Status: DISCONTINUED | OUTPATIENT
Start: 2024-04-15 | End: 2024-04-15 | Stop reason: SURG

## 2024-04-15 RX ORDER — PROPOFOL 10 MG/ML
INJECTION, EMULSION INTRAVENOUS AS NEEDED
Status: DISCONTINUED | OUTPATIENT
Start: 2024-04-15 | End: 2024-04-15 | Stop reason: SURG

## 2024-04-15 RX ORDER — SODIUM CHLORIDE, SODIUM LACTATE, POTASSIUM CHLORIDE, CALCIUM CHLORIDE 600; 310; 30; 20 MG/100ML; MG/100ML; MG/100ML; MG/100ML
30 INJECTION, SOLUTION INTRAVENOUS CONTINUOUS
Status: DISCONTINUED | OUTPATIENT
Start: 2024-04-15 | End: 2024-04-15 | Stop reason: HOSPADM

## 2024-04-15 RX ORDER — LIDOCAINE HYDROCHLORIDE 20 MG/ML
INJECTION, SOLUTION INFILTRATION; PERINEURAL AS NEEDED
Status: DISCONTINUED | OUTPATIENT
Start: 2024-04-15 | End: 2024-04-15 | Stop reason: SURG

## 2024-04-15 RX ORDER — SODIUM CHLORIDE 9 MG/ML
50 INJECTION, SOLUTION INTRAVENOUS CONTINUOUS
Status: DISCONTINUED | OUTPATIENT
Start: 2024-04-15 | End: 2024-04-15 | Stop reason: HOSPADM

## 2024-04-15 RX ADMIN — PROPOFOL INJECTABLE EMULSION 30 MG: 10 INJECTION, EMULSION INTRAVENOUS at 07:30

## 2024-04-15 RX ADMIN — PROPOFOL INJECTABLE EMULSION 30 MG: 10 INJECTION, EMULSION INTRAVENOUS at 07:40

## 2024-04-15 RX ADMIN — PROPOFOL INJECTABLE EMULSION 30 MG: 10 INJECTION, EMULSION INTRAVENOUS at 07:32

## 2024-04-15 RX ADMIN — PROPOFOL INJECTABLE EMULSION 30 MG: 10 INJECTION, EMULSION INTRAVENOUS at 07:36

## 2024-04-15 RX ADMIN — PROPOFOL INJECTABLE EMULSION 20 MG: 10 INJECTION, EMULSION INTRAVENOUS at 07:28

## 2024-04-15 RX ADMIN — PROPOFOL INJECTABLE EMULSION 30 MG: 10 INJECTION, EMULSION INTRAVENOUS at 07:34

## 2024-04-15 RX ADMIN — LIDOCAINE HYDROCHLORIDE 80 MG: 20 INJECTION, SOLUTION INFILTRATION; PERINEURAL at 07:21

## 2024-04-15 RX ADMIN — PROPOFOL INJECTABLE EMULSION 20 MG: 10 INJECTION, EMULSION INTRAVENOUS at 07:38

## 2024-04-15 RX ADMIN — SODIUM CHLORIDE: 9 INJECTION, SOLUTION INTRAVENOUS at 07:17

## 2024-04-15 RX ADMIN — PROPOFOL INJECTABLE EMULSION 30 MG: 10 INJECTION, EMULSION INTRAVENOUS at 07:24

## 2024-04-15 RX ADMIN — PROPOFOL INJECTABLE EMULSION 30 MG: 10 INJECTION, EMULSION INTRAVENOUS at 07:23

## 2024-04-15 RX ADMIN — PROPOFOL INJECTABLE EMULSION 80 MG: 10 INJECTION, EMULSION INTRAVENOUS at 07:21

## 2024-04-15 RX ADMIN — PROPOFOL INJECTABLE EMULSION 20 MG: 10 INJECTION, EMULSION INTRAVENOUS at 07:26

## 2024-04-15 NOTE — H&P (VIEW-ONLY)
Colorectal Surgery Followup Note    ID:  Charity Mcclure;   : 1960  DATE OF VISIT: 4/15/2024    Chief Complaint  No chief complaint on file.       Subjective  Subjective     Patient is s/p robotic sigmoid resection with end colostomy with left adnexal resection for perforated diverticulitis with abscess. She is doing great and recovering well. She has been doing activities at home. She has good appetite and ostomy has been functioning. She has no pain and is not taking any pain meds. Denies any fever or chills.      Exam  General:  No acute distress  Head: Normocephalic, atraumatic  Neuro: Alert and oriented     Abdomen:  Soft, non-tender, non-distended, no hernias, no hepatomegaly, no splenomegaly. No abnormal, audible bowel sounds.Ostomy viable and functioning. Staples removed.      Assessment  - complicated diverticulitis   - Rodriguez's colostomy      Plan / Recommendations  -Recovering very well. Discussed milestones for post operative course  - no diet restrictions  - continue with supplemental fiber   - plan for colostomy closure in April   - She will need colonoscopy prior to procedure given she is over 10 years since her last colonoscopy         Harvinder Vasquez MD  Colon and Rectal Surgery   Protestanttiffany Vasquez MD  Colon and Rectal Surgery   Jody Dexter

## 2024-04-15 NOTE — DISCHARGE INSTRUCTIONS
POST COLONOSCOPY DISCHARGE INSTRUCTIONS    Immediate Action:  Please call our clinic at [phone] if you experience any of the following symptoms:    Fever over 100°F  Abnormal abdominal pain  Rectal bleeding more than a tablespoon  Nausea or vomiting that does not resolve  After Clinic Hours:  You will be directed to our on-call physician for assistance.    Emergencies:  For emergent needs, please call 911.    Cautionary Note for Sedation: If sedation was administered during the procedure, exercise caution as these medications can alter judgment and reflexes for at least 12 hours afterward. It is mandatory to have someone responsible over 18 years of age drive you home and stay with you.    Avoid Activities:  Please refrain from:    Driving  Consuming alcoholic beverages  Taking additional sedatives  Signing legal documents  Operating heavy machinery  Resumption of Normal Activities:  You may:    Resume a normal diet unless instructed otherwise by your doctor.  Resume normal medications unless otherwise instructed by your doctor.  Bloating:   Bloating may occur over the next 1-2 days, which is normal post-procedure.    Follow-Up for Results:  If polyps or tissue samples were removed during your procedure, anticipate receiving your provider's interpretation of results approximately 7-14 business days after the procedure. This information will be conveyed via [employer] (if signed up), letter via mail, or a phone call.      Harvinder Vasquez MD  Colon and Rectal Surgery   Druzetiffany Dexter

## 2024-04-15 NOTE — H&P
Colorectal Surgery Followup Note    ID:  Charity Mcclure;   : 1960  DATE OF VISIT: 4/15/2024    Chief Complaint  No chief complaint on file.       Subjective  Subjective     Patient is s/p robotic sigmoid resection with end colostomy with left adnexal resection for perforated diverticulitis with abscess. She is doing great and recovering well. She has been doing activities at home. She has good appetite and ostomy has been functioning. She has no pain and is not taking any pain meds. Denies any fever or chills.      Exam  General:  No acute distress  Head: Normocephalic, atraumatic  Neuro: Alert and oriented     Abdomen:  Soft, non-tender, non-distended, no hernias, no hepatomegaly, no splenomegaly. No abnormal, audible bowel sounds.Ostomy viable and functioning. Staples removed.      Assessment  - complicated diverticulitis   - Rodriguez's colostomy      Plan / Recommendations  -Recovering very well. Discussed milestones for post operative course  - no diet restrictions  - continue with supplemental fiber   - plan for colostomy closure in April   - She will need colonoscopy prior to procedure given she is over 10 years since her last colonoscopy         Harvinder Vasquez MD  Colon and Rectal Surgery   Islamtiffany Vasquez MD  Colon and Rectal Surgery   Jody Dexter

## 2024-04-15 NOTE — OP NOTE
[facility] Colorectal Surgery  Colonoscopy Examination     Name: Charity Mcclure  : 1960  Date of Colonoscopy: 4/15/2024  Procedure: Average Risk Screening Colonoscopy  Surgeon: Harvinder Vasquez MD   Anesthesia: Sedation   IV Fluids: refer to anesthesia record    Procedure Details:    Prior to the procedure, history and physical exam was performed. Patient's medication and allergies were reviewed. The risk and benefits of the procedure and sedation options and risks were discussed with the patient. All questions were answered and informed consent was obtained.    The patient was brought to the endoscopy suite and placed in the left lateral decubitus position. Conscious sedation was administered by the anesthesia team. Vital signs including blood pressure, heart rate, and oxygen saturation were monitored continuously throughout the procedure.    The colonoscope was introduced through the rectum and advanced to the rectum. The colonoscopy was then introduced through the colostomy and advanced through the colon under direct visualization. The scope was maneuvered carefully, and the mucosa of the  descending colon, transverse colon, ascending colon, and cecum were thoroughly inspected. Adequate insufflation was maintained throughout the procedure for optimal visualization.    Findings:    Rectum: Normal appearance. Small 5 mm polyp removed with cold biopsy forceps. Staple line noted and intact.                                                    Descending colon: Normal appearance with no lesions,polyps, or abnormalities.  Transverse colon: Normal appearance with no lesions,polyps, or abnormalities.  Ascending colon: Normal appearance with no lesions,polyps, or abnormalities.  Cecum: Normal appearance with no lesions,polyps, or abnormalities.    Procedure Images:      Attached in a separate file.     Interventions:  During the procedure, a single diminutive polyp measuring less than 5 mm in size was identified in the  rectum. The polyp was visualized and removed using cold biopsy forceps. Hemostasis was achieved successfully at the site of polypectomy without any immediate complications.     Specimens:  The removed polyp was retrieved and sent for histopathological examination to the pathology department for further analysis.    Recommendation:   Repeat colonoscopy in 10 years     Conclusion:  The screening colonoscopy was completed successfully, and the entire colon was visualized without any complications. The patient tolerated the procedure well and was transferred to the recovery area in stable condition. Post-procedure instructions and follow-up were discussed with the patient and will be provided in written form.    Harvinder Vasquez MD  Colon and Rectal Surgery   [facility] [place]-[address], [address]  T: [phone]

## 2024-04-15 NOTE — ANESTHESIA PREPROCEDURE EVALUATION
Anesthesia Evaluation     Patient summary reviewed and Nursing notes reviewed   no history of anesthetic complications:   NPO Solid Status: > 8 hours  NPO Liquid Status: > 2 hours           Airway   Mallampati: II  TM distance: >3 FB  Neck ROM: full  Dental - normal exam     Pulmonary - normal exam   (+) a smoker Current,sleep apnea  Cardiovascular - normal exam    ECG reviewed    (+) hypertension      Neuro/Psych  (+) psychiatric history  GI/Hepatic/Renal/Endo    (+) obesity, GERD, thyroid problem hypothyroidism    Musculoskeletal     Abdominal    Substance History      OB/GYN          Other   blood dyscrasia anemia,     ROS/Med Hx Other: Additional History:  Low albumin, pelvic abscess, perforated diverticulum, tubo-ovarian mass    PSH:                Anesthesia Plan    ASA 3     general   total IV anesthesia  (Patient identified; pre-operative vital signs, all relevant labs/studies, complete medical/surgical/anesthetic history, full medication list, full allergy list, and NPO status obtained/reviewed; physical assessment performed; anesthetic options, side effects, potential complications, risks, and benefits discussed; questions answered; written anesthesia consent obtained; patient cleared for procedure; anesthesia machine and equipment checked and functioning)  intravenous induction     Anesthetic plan, risks, benefits, and alternatives have been provided, discussed and informed consent has been obtained with: patient.    Plan discussed with CRNA and CAA.    CODE STATUS:

## 2024-04-15 NOTE — ANESTHESIA POSTPROCEDURE EVALUATION
Patient: Charity Mcclure    Procedure Summary       Date: 04/15/24 Room / Location: [facility] ENDOSCOPY 4 / [facility] ENDOSCOPY    Anesthesia Start: 0717 Anesthesia Stop: 0747    Procedure: COLONOSCOPY WITH POLYPECTOMY 1 (Rectum) Diagnosis:       Diverticulitis of colon with perforation      Colostomy in place      (Diverticulitis of colon with perforation [K57.20])      (Colostomy in place [Z93.3])    Surgeons: Harvinder Vasquez MD Provider: Rosita Freitas MD    Anesthesia Type: general ASA Status: 3            Anesthesia Type: general    Vitals  Vitals Value Taken Time   /79 04/15/24 0806   Temp     Pulse 73 04/15/24 0807   Resp 16 04/15/24 0805   SpO2 97 % 04/15/24 0807   Vitals shown include unfiled device data.        Post Anesthesia Care and Evaluation    Patient location during evaluation: PACU  Patient participation: complete - patient participated  Level of consciousness: awake and alert  Pain management: satisfactory to patient    Airway patency: patent  Anesthetic complications: No anesthetic complications  PONV Status: none  Cardiovascular status: acceptable  Respiratory status: acceptable  Hydration status: acceptable

## 2024-04-16 ENCOUNTER — ANESTHESIA EVENT (OUTPATIENT)
Dept: PERIOP | Facility: HOSPITAL | Age: 64
DRG: 346 | End: 2024-04-16
Payer: COMMERCIAL

## 2024-04-16 ENCOUNTER — HOSPITAL ENCOUNTER (INPATIENT)
Facility: HOSPITAL | Age: 64
LOS: 1 days | Discharge: HOME | DRG: 346 | End: 2024-04-16
Attending: STUDENT IN AN ORGANIZED HEALTH CARE EDUCATION/TRAINING PROGRAM | Admitting: STUDENT IN AN ORGANIZED HEALTH CARE EDUCATION/TRAINING PROGRAM
Payer: COMMERCIAL

## 2024-04-16 ENCOUNTER — ANESTHESIA (OUTPATIENT)
Dept: PERIOP | Facility: HOSPITAL | Age: 64
DRG: 346 | End: 2024-04-16
Payer: COMMERCIAL

## 2024-04-16 VITALS
HEART RATE: 79 BPM | SYSTOLIC BLOOD PRESSURE: 135 MMHG | RESPIRATION RATE: 20 BRPM | TEMPERATURE: 98 F | WEIGHT: 158 LBS | BODY MASS INDEX: 29.83 KG/M2 | DIASTOLIC BLOOD PRESSURE: 87 MMHG | HEIGHT: 61 IN | OXYGEN SATURATION: 96 %

## 2024-04-16 DIAGNOSIS — Z93.3 COLOSTOMY IN PLACE: ICD-10-CM

## 2024-04-16 DIAGNOSIS — K57.20 DIVERTICULITIS OF COLON WITH PERFORATION: Primary | ICD-10-CM

## 2024-04-16 DIAGNOSIS — N94.89 TUBO-OVARIAN MASS: ICD-10-CM

## 2024-04-16 LAB
LAB AP CASE REPORT: NORMAL
LAB AP DIAGNOSIS COMMENT: NORMAL
PATH REPORT.FINAL DX SPEC: NORMAL
PATH REPORT.GROSS SPEC: NORMAL

## 2024-04-16 PROCEDURE — 44227 LAP CLOSE ENTEROSTOMY: CPT | Performed by: STUDENT IN AN ORGANIZED HEALTH CARE EDUCATION/TRAINING PROGRAM

## 2024-04-16 PROCEDURE — 25810000003 LACTATED RINGERS PER 1000 ML: Performed by: STUDENT IN AN ORGANIZED HEALTH CARE EDUCATION/TRAINING PROGRAM

## 2024-04-16 PROCEDURE — 25010000002 INDOCYANINE GREEN 25 MG RECONSTITUTED SOLUTION: Performed by: ANESTHESIOLOGIST ASSISTANT

## 2024-04-16 PROCEDURE — 4A1BXSH MONITORING OF GASTROINTESTINAL VASCULAR PERFUSION USING INDOCYANINE GREEN DYE, EXTERNAL APPROACH: ICD-10-PCS | Performed by: STUDENT IN AN ORGANIZED HEALTH CARE EDUCATION/TRAINING PROGRAM

## 2024-04-16 PROCEDURE — 25010000002 SUGAMMADEX 200 MG/2ML SOLUTION: Performed by: ANESTHESIOLOGIST ASSISTANT

## 2024-04-16 PROCEDURE — 25010000002 HEPARIN (PORCINE) PER 1000 UNITS: Performed by: STUDENT IN AN ORGANIZED HEALTH CARE EDUCATION/TRAINING PROGRAM

## 2024-04-16 PROCEDURE — 25010000002 DEXAMETHASONE PER 1 MG: Performed by: ANESTHESIOLOGIST ASSISTANT

## 2024-04-16 PROCEDURE — 25010000002 PROPOFOL 200 MG/20ML EMULSION: Performed by: ANESTHESIOLOGIST ASSISTANT

## 2024-04-16 PROCEDURE — 25010000002 CEFAZOLIN PER 500 MG: Performed by: STUDENT IN AN ORGANIZED HEALTH CARE EDUCATION/TRAINING PROGRAM

## 2024-04-16 PROCEDURE — 25010000002 MIDAZOLAM PER 1 MG: Performed by: ANESTHESIOLOGIST ASSISTANT

## 2024-04-16 PROCEDURE — 44227 LAP CLOSE ENTEROSTOMY: CPT | Performed by: SURGERY

## 2024-04-16 PROCEDURE — 0D7Q7ZZ DILATION OF ANUS, VIA NATURAL OR ARTIFICIAL OPENING: ICD-10-PCS | Performed by: STUDENT IN AN ORGANIZED HEALTH CARE EDUCATION/TRAINING PROGRAM

## 2024-04-16 PROCEDURE — 25010000002 FENTANYL CITRATE (PF) 100 MCG/2ML SOLUTION: Performed by: ANESTHESIOLOGIST ASSISTANT

## 2024-04-16 PROCEDURE — 25010000002 BUPIVACAINE (PF) 0.25 % SOLUTION: Performed by: STUDENT IN AN ORGANIZED HEALTH CARE EDUCATION/TRAINING PROGRAM

## 2024-04-16 PROCEDURE — 0DJD8ZZ INSPECTION OF LOWER INTESTINAL TRACT, VIA NATURAL OR ARTIFICIAL OPENING ENDOSCOPIC: ICD-10-PCS | Performed by: STUDENT IN AN ORGANIZED HEALTH CARE EDUCATION/TRAINING PROGRAM

## 2024-04-16 PROCEDURE — 25010000002 ONDANSETRON PER 1 MG: Performed by: ANESTHESIOLOGIST ASSISTANT

## 2024-04-16 PROCEDURE — 88305 TISSUE EXAM BY PATHOLOGIST: CPT | Performed by: STUDENT IN AN ORGANIZED HEALTH CARE EDUCATION/TRAINING PROGRAM

## 2024-04-16 PROCEDURE — 88304 TISSUE EXAM BY PATHOLOGIST: CPT | Performed by: STUDENT IN AN ORGANIZED HEALTH CARE EDUCATION/TRAINING PROGRAM

## 2024-04-16 PROCEDURE — 25010000002 BUPIVACAINE (PF) 0.25 % SOLUTION: Performed by: ANESTHESIOLOGY

## 2024-04-16 PROCEDURE — 0DSM4ZZ REPOSITION DESCENDING COLON, PERCUTANEOUS ENDOSCOPIC APPROACH: ICD-10-PCS | Performed by: STUDENT IN AN ORGANIZED HEALTH CARE EDUCATION/TRAINING PROGRAM

## 2024-04-16 PROCEDURE — 15860 IV NJX TST VASC FLO FLAP/GRF: CPT | Performed by: STUDENT IN AN ORGANIZED HEALTH CARE EDUCATION/TRAINING PROGRAM

## 2024-04-16 PROCEDURE — 8E0W4CZ ROBOTIC ASSISTED PROCEDURE OF TRUNK REGION, PERCUTANEOUS ENDOSCOPIC APPROACH: ICD-10-PCS | Performed by: STUDENT IN AN ORGANIZED HEALTH CARE EDUCATION/TRAINING PROGRAM

## 2024-04-16 PROCEDURE — 45330 DIAGNOSTIC SIGMOIDOSCOPY: CPT | Performed by: STUDENT IN AN ORGANIZED HEALTH CARE EDUCATION/TRAINING PROGRAM

## 2024-04-16 DEVICE — CELLULAR STAPLER WITH TRI-STAPLE TECHNOLOGY
Type: IMPLANTABLE DEVICE | Site: COLON | Status: FUNCTIONAL
Brand: EEA

## 2024-04-16 RX ORDER — ACETAMINOPHEN 500 MG
1000 TABLET ORAL EVERY 6 HOURS
Status: DISCONTINUED | OUTPATIENT
Start: 2024-04-16 | End: 2024-04-16 | Stop reason: HOSPADM

## 2024-04-16 RX ORDER — HEPARIN SODIUM 5000 [USP'U]/ML
5000 INJECTION, SOLUTION INTRAVENOUS; SUBCUTANEOUS ONCE
Status: COMPLETED | OUTPATIENT
Start: 2024-04-16 | End: 2024-04-16

## 2024-04-16 RX ORDER — SCOLOPAMINE TRANSDERMAL SYSTEM 1 MG/1
1 PATCH, EXTENDED RELEASE TRANSDERMAL ONCE
Status: DISCONTINUED | OUTPATIENT
Start: 2024-04-16 | End: 2024-04-16 | Stop reason: HOSPADM

## 2024-04-16 RX ORDER — ONDANSETRON 2 MG/ML
4 INJECTION INTRAMUSCULAR; INTRAVENOUS EVERY 6 HOURS PRN
Status: CANCELLED | OUTPATIENT
Start: 2024-04-16

## 2024-04-16 RX ORDER — PROPOFOL 10 MG/ML
INJECTION, EMULSION INTRAVENOUS AS NEEDED
Status: DISCONTINUED | OUTPATIENT
Start: 2024-04-16 | End: 2024-04-16 | Stop reason: SURG

## 2024-04-16 RX ORDER — LIDOCAINE HYDROCHLORIDE 20 MG/ML
INJECTION, SOLUTION EPIDURAL; INFILTRATION; INTRACAUDAL; PERINEURAL AS NEEDED
Status: DISCONTINUED | OUTPATIENT
Start: 2024-04-16 | End: 2024-04-16 | Stop reason: SURG

## 2024-04-16 RX ORDER — ONDANSETRON 2 MG/ML
4 INJECTION INTRAMUSCULAR; INTRAVENOUS ONCE AS NEEDED
Status: DISCONTINUED | OUTPATIENT
Start: 2024-04-16 | End: 2024-04-16 | Stop reason: HOSPADM

## 2024-04-16 RX ORDER — ONDANSETRON 2 MG/ML
INJECTION INTRAMUSCULAR; INTRAVENOUS AS NEEDED
Status: DISCONTINUED | OUTPATIENT
Start: 2024-04-16 | End: 2024-04-16 | Stop reason: SURG

## 2024-04-16 RX ORDER — KETAMINE HCL IN NACL, ISO-OSM 100MG/10ML
SYRINGE (ML) INJECTION AS NEEDED
Status: DISCONTINUED | OUTPATIENT
Start: 2024-04-16 | End: 2024-04-16 | Stop reason: SURG

## 2024-04-16 RX ORDER — PANTOPRAZOLE SODIUM 20 MG/1
20 TABLET, DELAYED RELEASE ORAL NIGHTLY
Status: CANCELLED | OUTPATIENT
Start: 2024-04-16

## 2024-04-16 RX ORDER — METHOCARBAMOL 750 MG/1
750 TABLET, FILM COATED ORAL 4 TIMES DAILY
Status: DISCONTINUED | OUTPATIENT
Start: 2024-04-16 | End: 2024-04-16 | Stop reason: HOSPADM

## 2024-04-16 RX ORDER — OXYCODONE HYDROCHLORIDE 5 MG/1
5 TABLET ORAL EVERY 8 HOURS PRN
Qty: 40 TABLET | Refills: 0 | Status: SHIPPED | OUTPATIENT
Start: 2024-04-16 | End: 2024-05-26

## 2024-04-16 RX ORDER — DEXAMETHASONE SODIUM PHOSPHATE 4 MG/ML
INJECTION, SOLUTION INTRA-ARTICULAR; INTRALESIONAL; INTRAMUSCULAR; INTRAVENOUS; SOFT TISSUE
Status: COMPLETED | OUTPATIENT
Start: 2024-04-16 | End: 2024-04-16

## 2024-04-16 RX ORDER — BUPIVACAINE HYDROCHLORIDE 2.5 MG/ML
INJECTION, SOLUTION EPIDURAL; INFILTRATION; INTRACAUDAL
Status: COMPLETED | OUTPATIENT
Start: 2024-04-16 | End: 2024-04-16

## 2024-04-16 RX ORDER — ESCITALOPRAM OXALATE 10 MG/1
20 TABLET ORAL NIGHTLY
Status: CANCELLED | OUTPATIENT
Start: 2024-04-16

## 2024-04-16 RX ORDER — PREGABALIN 75 MG/1
75 CAPSULE ORAL ONCE
Status: COMPLETED | OUTPATIENT
Start: 2024-04-16 | End: 2024-04-16

## 2024-04-16 RX ORDER — MAGNESIUM HYDROXIDE 1200 MG/15ML
LIQUID ORAL AS NEEDED
Status: DISCONTINUED | OUTPATIENT
Start: 2024-04-16 | End: 2024-04-16 | Stop reason: HOSPADM

## 2024-04-16 RX ORDER — PHENYLEPHRINE HCL IN 0.9% NACL 1 MG/10 ML
SYRINGE (ML) INTRAVENOUS AS NEEDED
Status: DISCONTINUED | OUTPATIENT
Start: 2024-04-16 | End: 2024-04-16 | Stop reason: SURG

## 2024-04-16 RX ORDER — OXYCODONE HYDROCHLORIDE 5 MG/1
5 TABLET ORAL EVERY 4 HOURS PRN
Status: CANCELLED | OUTPATIENT
Start: 2024-04-16 | End: 2024-04-21

## 2024-04-16 RX ORDER — PREGABALIN 25 MG/1
25 CAPSULE ORAL EVERY 8 HOURS SCHEDULED
Status: DISCONTINUED | OUTPATIENT
Start: 2024-04-16 | End: 2024-04-16 | Stop reason: HOSPADM

## 2024-04-16 RX ORDER — METHOCARBAMOL 750 MG/1
750 TABLET, FILM COATED ORAL 4 TIMES DAILY
Qty: 60 TABLET | Refills: 0 | Status: SHIPPED | OUTPATIENT
Start: 2024-04-16 | End: 2024-05-01

## 2024-04-16 RX ORDER — INDOCYANINE GREEN AND WATER 25 MG
KIT INJECTION AS NEEDED
Status: DISCONTINUED | OUTPATIENT
Start: 2024-04-16 | End: 2024-04-16 | Stop reason: SURG

## 2024-04-16 RX ORDER — NALOXONE HCL 0.4 MG/ML
0.4 VIAL (ML) INJECTION
Status: CANCELLED | OUTPATIENT
Start: 2024-04-16

## 2024-04-16 RX ORDER — FENTANYL CITRATE 50 UG/ML
INJECTION, SOLUTION INTRAMUSCULAR; INTRAVENOUS AS NEEDED
Status: DISCONTINUED | OUTPATIENT
Start: 2024-04-16 | End: 2024-04-16 | Stop reason: SURG

## 2024-04-16 RX ORDER — ROCURONIUM BROMIDE 10 MG/ML
INJECTION, SOLUTION INTRAVENOUS AS NEEDED
Status: DISCONTINUED | OUTPATIENT
Start: 2024-04-16 | End: 2024-04-16 | Stop reason: SURG

## 2024-04-16 RX ORDER — LEVOTHYROXINE SODIUM 0.03 MG/1
25 TABLET ORAL
Status: CANCELLED | OUTPATIENT
Start: 2024-04-16

## 2024-04-16 RX ORDER — DEXAMETHASONE SODIUM PHOSPHATE 4 MG/ML
INJECTION, SOLUTION INTRA-ARTICULAR; INTRALESIONAL; INTRAMUSCULAR; INTRAVENOUS; SOFT TISSUE AS NEEDED
Status: DISCONTINUED | OUTPATIENT
Start: 2024-04-16 | End: 2024-04-16 | Stop reason: SURG

## 2024-04-16 RX ORDER — ALVIMOPAN 12 MG/1
12 CAPSULE ORAL ONCE
Status: COMPLETED | OUTPATIENT
Start: 2024-04-16 | End: 2024-04-16

## 2024-04-16 RX ORDER — ALVIMOPAN 12 MG/1
12 CAPSULE ORAL 2 TIMES DAILY
Status: CANCELLED | OUTPATIENT
Start: 2024-04-16 | End: 2024-04-23

## 2024-04-16 RX ORDER — MIDAZOLAM HYDROCHLORIDE 1 MG/ML
INJECTION INTRAMUSCULAR; INTRAVENOUS AS NEEDED
Status: DISCONTINUED | OUTPATIENT
Start: 2024-04-16 | End: 2024-04-16 | Stop reason: SURG

## 2024-04-16 RX ORDER — FENTANYL CITRATE 50 UG/ML
50 INJECTION, SOLUTION INTRAMUSCULAR; INTRAVENOUS
Status: DISCONTINUED | OUTPATIENT
Start: 2024-04-16 | End: 2024-04-16 | Stop reason: HOSPADM

## 2024-04-16 RX ORDER — METRONIDAZOLE 500 MG/100ML
500 INJECTION, SOLUTION INTRAVENOUS ONCE
Status: COMPLETED | OUTPATIENT
Start: 2024-04-16 | End: 2024-04-16

## 2024-04-16 RX ORDER — SODIUM CHLORIDE, SODIUM LACTATE, POTASSIUM CHLORIDE, CALCIUM CHLORIDE 600; 310; 30; 20 MG/100ML; MG/100ML; MG/100ML; MG/100ML
1000 INJECTION, SOLUTION INTRAVENOUS CONTINUOUS
Status: DISCONTINUED | OUTPATIENT
Start: 2024-04-16 | End: 2024-04-16 | Stop reason: HOSPADM

## 2024-04-16 RX ORDER — BUPIVACAINE HYDROCHLORIDE 2.5 MG/ML
INJECTION, SOLUTION EPIDURAL; INFILTRATION; INTRACAUDAL AS NEEDED
Status: DISCONTINUED | OUTPATIENT
Start: 2024-04-16 | End: 2024-04-16 | Stop reason: HOSPADM

## 2024-04-16 RX ORDER — ENOXAPARIN SODIUM 100 MG/ML
40 INJECTION SUBCUTANEOUS DAILY
Status: CANCELLED | OUTPATIENT
Start: 2024-04-17

## 2024-04-16 RX ADMIN — FENTANYL CITRATE 50 MCG: 50 INJECTION, SOLUTION INTRAMUSCULAR; INTRAVENOUS at 09:17

## 2024-04-16 RX ADMIN — CEFAZOLIN 2000 MG: 2 INJECTION, POWDER, FOR SOLUTION INTRAMUSCULAR; INTRAVENOUS at 08:26

## 2024-04-16 RX ADMIN — HEPARIN SODIUM 5000 UNITS: 5000 INJECTION INTRAVENOUS; SUBCUTANEOUS at 07:16

## 2024-04-16 RX ADMIN — PROPOFOL 200 MG: 10 INJECTION, EMULSION INTRAVENOUS at 08:28

## 2024-04-16 RX ADMIN — Medication 20 MG: at 09:19

## 2024-04-16 RX ADMIN — DEXAMETHASONE SODIUM PHOSPHATE 4 MG: 4 INJECTION, SOLUTION INTRA-ARTICULAR; INTRALESIONAL; INTRAMUSCULAR; INTRAVENOUS; SOFT TISSUE at 08:37

## 2024-04-16 RX ADMIN — SUGAMMADEX 200 MG: 100 INJECTION, SOLUTION INTRAVENOUS at 11:03

## 2024-04-16 RX ADMIN — PROPOFOL 20 MG: 10 INJECTION, EMULSION INTRAVENOUS at 10:58

## 2024-04-16 RX ADMIN — ONDANSETRON 4 MG: 2 INJECTION INTRAMUSCULAR; INTRAVENOUS at 10:34

## 2024-04-16 RX ADMIN — MIDAZOLAM HYDROCHLORIDE 2 MG: 2 INJECTION, SOLUTION INTRAMUSCULAR; INTRAVENOUS at 09:19

## 2024-04-16 RX ADMIN — ALVIMOPAN 12 MG: 12 CAPSULE ORAL at 07:15

## 2024-04-16 RX ADMIN — PROPOFOL 150 MCG/KG/MIN: 10 INJECTION, EMULSION INTRAVENOUS at 09:34

## 2024-04-16 RX ADMIN — ROCURONIUM BROMIDE 10 MG: 10 INJECTION, SOLUTION INTRAVENOUS at 10:16

## 2024-04-16 RX ADMIN — INDOCYANINE GREEN AND WATER 7.5 MG: KIT at 10:09

## 2024-04-16 RX ADMIN — PROPOFOL 30 MG: 10 INJECTION, EMULSION INTRAVENOUS at 10:41

## 2024-04-16 RX ADMIN — Medication 10 MG: at 09:37

## 2024-04-16 RX ADMIN — DEXAMETHASONE SODIUM PHOSPHATE 8 MG: 4 INJECTION, SOLUTION INTRA-ARTICULAR; INTRALESIONAL; INTRAMUSCULAR; INTRAVENOUS; SOFT TISSUE at 08:35

## 2024-04-16 RX ADMIN — ACETAMINOPHEN 1000 MG: 500 TABLET, FILM COATED ORAL at 07:15

## 2024-04-16 RX ADMIN — PROPOFOL 150 MCG/KG/MIN: 10 INJECTION, EMULSION INTRAVENOUS at 08:32

## 2024-04-16 RX ADMIN — SCOPALAMINE 1 PATCH: 1 PATCH, EXTENDED RELEASE TRANSDERMAL at 07:15

## 2024-04-16 RX ADMIN — ROCURONIUM BROMIDE 50 MG: 10 INJECTION, SOLUTION INTRAVENOUS at 08:28

## 2024-04-16 RX ADMIN — Medication 150 MCG: at 08:47

## 2024-04-16 RX ADMIN — PREGABALIN 75 MG: 75 CAPSULE ORAL at 07:15

## 2024-04-16 RX ADMIN — METRONIDAZOLE 500 MG: 500 INJECTION, SOLUTION INTRAVENOUS at 08:34

## 2024-04-16 RX ADMIN — ROCURONIUM BROMIDE 10 MG: 10 INJECTION, SOLUTION INTRAVENOUS at 09:38

## 2024-04-16 RX ADMIN — LIDOCAINE HYDROCHLORIDE 100 MG: 20 INJECTION, SOLUTION EPIDURAL; INFILTRATION; INTRACAUDAL; PERINEURAL at 08:28

## 2024-04-16 RX ADMIN — SODIUM CHLORIDE, POTASSIUM CHLORIDE, SODIUM LACTATE AND CALCIUM CHLORIDE 1000 ML: 600; 310; 30; 20 INJECTION, SOLUTION INTRAVENOUS at 06:37

## 2024-04-16 RX ADMIN — SODIUM CHLORIDE, POTASSIUM CHLORIDE, SODIUM LACTATE AND CALCIUM CHLORIDE: 600; 310; 30; 20 INJECTION, SOLUTION INTRAVENOUS at 11:06

## 2024-04-16 RX ADMIN — Medication 100 MCG: at 08:30

## 2024-04-16 RX ADMIN — FENTANYL CITRATE 50 MCG: 50 INJECTION, SOLUTION INTRAMUSCULAR; INTRAVENOUS at 09:52

## 2024-04-16 RX ADMIN — ROCURONIUM BROMIDE 30 MG: 10 INJECTION, SOLUTION INTRAVENOUS at 09:04

## 2024-04-16 RX ADMIN — Medication 100 MCG: at 10:44

## 2024-04-16 RX ADMIN — BUPIVACAINE HYDROCHLORIDE 60 ML: 2.5 INJECTION, SOLUTION EPIDURAL; INFILTRATION; INTRACAUDAL; PERINEURAL at 08:35

## 2024-04-16 RX ADMIN — FENTANYL CITRATE 100 MCG: 50 INJECTION, SOLUTION INTRAMUSCULAR; INTRAVENOUS at 08:28

## 2024-04-16 NOTE — ANESTHESIA PREPROCEDURE EVALUATION
Anesthesia Evaluation     NPO Solid Status: > 8 hours  NPO Liquid Status: > 8 hours           Airway   Mallampati: II  TM distance: >3 FB  Neck ROM: full  No difficulty expected  Dental - normal exam     Pulmonary - normal exam   (+) ,sleep apnea on CPAP  Cardiovascular - normal exam    (+) hypertension well controlled      Neuro/Psych  GI/Hepatic/Renal/Endo    (+) GERD well controlled, thyroid problem hypothyroidism    Musculoskeletal     Abdominal  - normal exam    Bowel sounds: normal.   Substance History      OB/GYN          Other                    Anesthesia Plan    ASA 3     general, ERAS Protocol and general with block   total IV anesthesia  intravenous induction     Anesthetic plan, risks, benefits, and alternatives have been provided, discussed and informed consent has been obtained with: patient.  Pre-procedure education provided  Plan discussed with CRNA.    CODE STATUS:

## 2024-04-16 NOTE — OP NOTE
CRS Operative Note   Name: Charity Mcclure  : 1960    Date of Surgery: 2024  Pre-op Diagnosis: Colostomy   Post-op Diagnosis: same  Procedure:     Robotic Assisted Laparoscopic Johnson Colostomy Reversal   Flexible Sigmoidoscopy   ICG angiography     Surgeon: Harvinder Vasquez MD   Assistants:     Valentina Dumont: was responsible for performing the following activities: Retraction, Suction, Irrigation, Suturing, Closing, and Placing Dressing and their skilled assistance was necessary for the success of this case.    Dr. Dwaine Masters assisted with anastomosis creation and critical portions of the case.     Anesthesia: General Endotracheal Anesthesia   IV Fluids: refer to anesthesia record  Estimated Blood Loss: minimal  Drains: none  Implants: none  Specimen: colostomy and anastomotic donuts   Findings   1. Anastomosis without tension   2. ICG angiography with viable and good perfusion to proximal and distal anastomosis   3. Flexible sigmoidoscopy with viable anastomosis and negative air leak.     Complications   No complications  Indication  Charity Mcclure is a 64 y.o. who had underwent sigmoid resection and johnson colostomy is now present for colostomy reversal.   Description of Procedure    After appropriate consent including risks, benefits and alternatives was obtained, the patient was brought to the OR, and anesthesia was then administered. Patient was placed in lithotomy position. All shakira prominences were padded, antibiotics were given, and scds placed. Both arms were tucked with adequate cushioning.  The patient was prepped and drapped in usual sterile fashion.The dissection took place by first taking down the colostomy. This was done by first using cautery to divide the mucocutaneous junction. This was done circumferentially down to the fascia. The bowel was then completely freed from the fascia.     2 cm of end colostomy was resected with electrocautery. Anvil of 28 mm EEA stapler was  placed in the colon, purse-string closed with 3-0 PDS. An additional 3-0 suture was tied around the anvil. This was cleared of mesentery. The colon was then placed back into the abdomen. A small wound protector was placed with an Raj cap and 12 mm port.        The Robotic camera was inserted and the abdomen explored. Additional three 8 mm ports were placed in transverse line using her old trocar site. The patient was placed in trendelenberg and small bowel was swept away from pelvis. The robot was then docked. Adhesions from small bowel to the anterior abdominal wall was taken down with robotic scissors.      Pelvic adhesion were mobilized both sharply and blunt dissection.The rectal stump was grasped and mobilized circumferentially. The proximal end of the rectal stump was cleared of mesentery.The proximal colon was mobilized off the abdominal off for tension free anastomosis.     The EEA sizers were used to confirm adequate length and diameter of the rectum. Anal stenosis was noted. Dilations were performed sequentially with two fingers first, followed by larger anal dilators.  Under direct visualization, the EEA stapler was inserted transanally and the spike deployed. The anvil and stapler were mated assuring the mesentery was properly positioned and the stapler tightened and fired.Two intact donuts were retrieved from the stapler.     Flexible sigmoidoscopy was performed to evaluate the anastomosis. The anastomosis was visualized. No bleeding was seen. Air leak test showed no bubbles emanating from the anastomosis.   The abdomen was once again explored, irrigated and hemostasis confirmed.       The fascial opening of the prior LLQ colostomy was closed with 1-0 PDS in figure of eight fashion in two layers after excising the hernia sac. The wound was irrigated and washed. 3-0 monocryl was used to place a purse string to make the wound smaller. 4-0 monocryl was used to close the robotic trocar sites.       Dressings were applied and the case concluded.     Harvinder Vasquez MD  Colon and Rectal Surgery   [facility]   [address] [place], [address]  T: [phone]

## 2024-04-16 NOTE — ANESTHESIA PROCEDURE NOTES
Airway  Urgency: elective    Date/Time: 4/16/2024 8:30 AM  End Time:4/16/2024 8:30 AM  Airway not difficult    General Information and Staff    Patient location during procedure: OR  Anesthesiologist: Star Galaviz MD  CRNA/CAA: Neelam Roman CAA    Indications and Patient Condition  Indications for airway management: airway protection    Preoxygenated: yes  Mask difficulty assessment: 1 - vent by mask    Final Airway Details  Final airway type: endotracheal airway      Successful airway: ETT  Cuffed: yes   Successful intubation technique: video laryngoscopy  Facilitating devices/methods: intubating stylet  Endotracheal tube insertion site: oral  Blade: Khan  Blade size: 3  ETT size (mm): 7.0  Cormack-Lehane Classification: grade I - full view of glottis  Placement verified by: chest auscultation, capnometry and palpation of cuff   Measured from: lips  ETT/EBT  to lips (cm): 21  Number of attempts at approach: 1  Assessment: lips, teeth, and gum same as pre-op and atraumatic intubation

## 2024-04-16 NOTE — ANESTHESIA PROCEDURE NOTES
Peripheral Block    Pre-sedation assessment completed: 4/16/2024 8:30 AM    Patient reassessed immediately prior to procedure    Patient location during procedure: OR  Start time: 4/16/2024 8:30 AM  Stop time: 4/16/2024 8:35 AM  Reason for block: at surgeon's request and post-op pain management  Performed by  Anesthesiologist: Star Galaviz MD  Preanesthetic Checklist  Completed: patient identified, IV checked, site marked, risks and benefits discussed, surgical consent, monitors and equipment checked, pre-op evaluation and timeout performed  Prep:  Pt Position: supine  Sterile barriers:cap, gloves, mask and washed/disinfected hands  Prep: ChloraPrep  Patient monitoring: blood pressure monitoring, continuous pulse oximetry and EKG  Procedure  Performed under: general  Guidance:ultrasound guided    ULTRASOUND INTERPRETATION.  Using ultrasound guidance a 20 G gauge needle was placed in close proximity to the nerve, at which point, under ultrasound guidance anesthetic was injected in the area of the nerve and spread of the anesthesia was seen on ultrasound in close proximity thereto.  There were no abnormalities seen on ultrasound; a digital image was taken; and the patient tolerated the procedure with no complications. Images:still images obtained, printed/placed on chart    Laterality:Bilateral  Block Type:TAP  Injection Technique:single-shot  Needle Type:echogenic  Needle Gauge:20 G  Resistance on Injection: none    Medications Used: dexamethasone (DECADRON) injection - Injection   8 mg - 4/16/2024 8:35:00 AM  bupivacaine PF (MARCAINE) 0.25 % injection - Perineural   60 mL - 4/16/2024 8:35:00 AM      Post Assessment  Injection Assessment: negative aspiration for heme, no paresthesia on injection and incremental injection  Patient Tolerance:comfortable throughout block  Complications:no

## 2024-04-16 NOTE — DISCHARGE INSTRUCTIONS
Post-Operative Discharge Instructions: Colon resection  [facility] Colorectal - [facility]      Your successful surgery marks an important step toward your recovery. To ensure a smooth and al recovery process, please follow these post-operative discharge instructions:    1. Wound Care:    -Keep the incision areas clean and dry.  -Report any signs of infection such as increased redness, swelling, or discharge.  - Its ok to take a shower.     2. Pain Management:    -Take prescribed pain medications as directed.  - Take tylenol and motrin alternatively every three hours. You can take prescribed narcotics only if you have breakthrough pain.   - Do not drive if you have taken narcotic pain meds.       3. Activity:    - Begin with light activities like short walks around the house.  - Gradually increase activity levels as tolerated, but avoid strenuous activities for the first few weeks.  - Avoid heavy lifting (more than 10 pounds) until cleared by your surgeon.    4. Diet:    - Continue with regular diet. If you start feeling bloated or nauseas, back down to clear liquid diet and progress to a full liquid diet as tolerated.Gradually reintroduce solid foods.  - Stay well-hydrated and avoid carbonated beverages initially.    5. Medications:    -Resume regular medications as instructed by your healthcare provider.    6. Follow-up Appointments:    - Schedule and attend all follow-up appointments with your surgeon and healthcare team.  - Discuss any concerns or questions you may have about your recovery.    7. Signs of Complications:    Seek immediate medical attention if you experience:  -Persistent fever  -Increased abdominal pain  -Worsening redness, swelling, or discharge at the incision site  -Persistent nausea or vomiting  -Difficulty breathing or chest pain    8. Rest and Self-Care:    -Allow yourself time to rest and recover.  -Prioritize self-care and listen to your body.  -Reach out to friends, family, or support groups for  assistance and emotional support.    Remember, these instructions are general guidelines, and your surgeon may provide specific recommendations tailored to your case. If you have any questions or concerns during your recovery, don't hesitate to contact your healthcare provider.    Wishing you a smooth and speedy recovery!  Harvinder Vasquez MD   Colon and Rectal Surgery   [facility]-[address], [address]  T: [phone]       None

## 2024-04-16 NOTE — ANESTHESIA POSTPROCEDURE EVALUATION
Patient: Charity Mcclure    Procedure Summary       Date: 04/16/24 Room / Location: [facility] OR 08 / [facility] MAIN OR    Anesthesia Start: 0824 Anesthesia Stop: 1117    Procedures:       Robotic Rodriguez Colostomy Closure (Abdomen)      SIGMOIDOSCOPY (Anus) Diagnosis:       Colostomy in place      (Colostomy in place [Z93.3])    Surgeons: Harvinder Vasquez MD Provider: Star Galaviz MD    Anesthesia Type: general, ERAS Protocol, general with block ASA Status: 3            Anesthesia Type: general, ERAS Protocol, general with block    Vitals  Vitals Value Taken Time   /87 04/16/24 1205   Temp 97.9 °F (36.6 °C) 04/16/24 1120   Pulse 72 04/16/24 1208   Resp 18 04/16/24 1150   SpO2 97 % 04/16/24 1208   Vitals shown include unfiled device data.        Post Anesthesia Care and Evaluation    Patient location during evaluation: PACU  Patient participation: complete - patient participated  Level of consciousness: awake  Pain scale: See nurse's notes for pain score.  Pain management: adequate    Airway patency: patent  Anesthetic complications: No anesthetic complications  PONV Status: none  Cardiovascular status: acceptable  Respiratory status: acceptable and spontaneous ventilation  Hydration status: acceptable    Comments: Patient seen and examined postoperatively; vital signs stable; SpO2 greater than or equal to 90%; cardiopulmonary status stable; nausea/vomiting adequately controlled; pain adequately controlled; no apparent anesthesia complications; patient discharged from anesthesia care when discharge criteria were met

## 2024-04-17 LAB
LAB AP CASE REPORT: NORMAL
PATH REPORT.FINAL DX SPEC: NORMAL
PATH REPORT.GROSS SPEC: NORMAL

## 2024-04-17 NOTE — DISCHARGE SUMMARY
Date of Admission: 4/16/2024  Date of Discharge:  4/17/2024  Primary Care Physician: Meron Whittaker MD     Discharge Diagnosis:  Active Hospital Problems    Diagnosis  POA    **Colostomy in place [Z93.3]  Not Applicable      Resolved Hospital Problems   No resolved problems to display.       Presenting Problem/History of Present Illness:  Colostomy in place [Z93.3]     Hospital Course:  The patient is a 64 y.o. female who presented with colostomy for tuboovarian mass.      Exam Today:    Procedures Performed:  Procedure(s):  Robotic Rodriguez Colostomy Closure  SIGMOIDOSCOPY  04/15 0720 Colonoscopy    Consults:   Consults       No orders found for last 30 day(s).             Discharge Disposition:  Home or Self Care    Discharge Medications:     Discharge Medications        New Medications        Instructions Start Date   methocarbamol 750 MG tablet  Commonly known as: ROBAXIN   750 mg, Oral, 4 Times Daily      oxyCODONE 5 MG immediate release tablet  Commonly known as: Roxicodone   5 mg, Oral, Every 8 Hours PRN             Continue These Medications        Instructions Start Date   acetaminophen 500 MG tablet  Commonly known as: TYLENOL   500 mg, Oral, Every 6 Hours PRN      chlorhexidine 4 % external liquid  Commonly known as: HIBICLENS   Topical, 2 Times Daily, Shower With Hibiclens Solution Twice The Day Before Surgery      erythromycin base 500 MG tablet  Commonly known as: E-MYCIN   500 mg, Oral      escitalopram 20 MG tablet  Commonly known as: LEXAPRO   20 mg, Oral, Nightly      levothyroxine 25 MCG tablet  Commonly known as: SYNTHROID, LEVOTHROID   25 mcg, Oral, Every Early Morning      lisinopril 10 MG tablet  Commonly known as: PRINIVIL,ZESTRIL   10 mg, Oral, Nightly      METAMUCIL PO   1 capsule, Oral, Nightly PRN, Powder, once daily      multivitamin with minerals tablet tablet   1 tablet, Oral, Daily      neomycin 500 MG tablet  Commonly known as: MYCIFRADIN   500 mg, Oral, 4 Times Daily       pantoprazole 20 MG EC tablet  Commonly known as: PROTONIX   20 mg, Oral, Nightly      polyethylene glycol 236 g solution  Commonly known as: GoLYTELY   Prepare in Advance: Mix the Golytely solution and put it in the fridge a few hours before drinking. Cold is better! Step 1: 5:00 PM the day before your colonoscopy It's time for your 1st dose of bowel prep. Step 2: 5 AM the morning of your colonoscopy, take your 2nd dose of bowel prep.  FIRST DOSE: Drink half of the bottle within 1 hour. Sip each glass quickly, and using a straw might make it easier. Put the rest in the fridge for later. Get ready for watery bowel movements, which usually start in about an hour. SECOND DOSE: Do the same as the first dose - you got this! Even after you finish, you might continue to have watery bowel movements.               Discharge Diet: regular     Activity at Discharge: avoid lifting over 10 lbs     Follow-up Appointments:  Future Appointments   Date Time Provider Department Center   4/22/2024  3:15 PM Harvinder Vasquez MD [facility]         Test Results Pending at Discharge:  Pending Labs       Order Current Status    Tissue Pathology Exam In process             Harvinder Vasquez MD  04/17/24  07:37 EDT    Time Spent on Discharge Activities:10 min

## 2024-04-18 ENCOUNTER — TELEPHONE (OUTPATIENT)
Age: 64
End: 2024-04-18
Payer: COMMERCIAL

## 2024-04-18 NOTE — TELEPHONE ENCOUNTER
Post op call to patient after Colostomy Closure on 4/16. Patient states she is feeling great. She is now passing gas. She states she has hardly any pain. No complaints of nausea, vomiting, diarrhea or constipation. No fevers. She is scheduled for her post op appt on 4/22. Advised to call with any questions or concerns

## 2024-04-22 ENCOUNTER — OFFICE VISIT (OUTPATIENT)
Age: 64
End: 2024-04-22
Payer: COMMERCIAL

## 2024-04-22 VITALS
BODY MASS INDEX: 30.02 KG/M2 | HEIGHT: 61 IN | HEART RATE: 86 BPM | DIASTOLIC BLOOD PRESSURE: 78 MMHG | TEMPERATURE: 97.3 F | SYSTOLIC BLOOD PRESSURE: 107 MMHG | WEIGHT: 159 LBS | OXYGEN SATURATION: 97 %

## 2024-04-22 DIAGNOSIS — K57.20 DIVERTICULITIS OF COLON WITH PERFORATION: Primary | ICD-10-CM

## 2024-04-22 DIAGNOSIS — Z98.890 STATUS POST SURGERY: ICD-10-CM

## 2024-04-22 PROCEDURE — 99024 POSTOP FOLLOW-UP VISIT: CPT | Performed by: STUDENT IN AN ORGANIZED HEALTH CARE EDUCATION/TRAINING PROGRAM

## 2024-04-22 NOTE — PROGRESS NOTES
Colorectal Surgery Followup Note    ID:  Charity Mcclure;   : 1960  DATE OF VISIT: 2024    Chief Complaint  Post-op (Post-op  Robotic Rodriguez Colostomy Closure)       Subjective    Mrs Mcclure is status post robotic Rodriguez's colostomy closure 6 days ago.  She is doing well.  Denies any nausea or vomiting.  She is currently taking Tylenol and Motrin for as needed pain medication.  She reports passing flatus and having bowel movements.  Denies any fevers or chills.  Denies any discharge from the colostomy site.     Exam  General:  No acute distress  Head: Normocephalic, atraumatic  Neuro: Alert and oriented    Abdomen:  Soft, non-tender, non-distended, no hernias, colostomy site with small opening with serosanguineous drainage    Assessment  -64-year-old female with Rodriguez colostomy status post robotic closure    Plan / Recommendations  -Recovering well as expected  -Discussed local wound care and dressing changes daily  -Discussed pain management.  He is currently only taking Tylenol as needed.  -Discussed postsurgical limitation including lifting weight and extraneous exercise.  -Follow-up in 3 weeks for further evaluation.       Harvinder Vasquez MD  Colon and Rectal Surgery   [facility]       Answers submitted by the patient for this visit:  Other (Submitted on 2024)  Please describe your symptoms.: Check up  Have you had these symptoms before?: No  How long have you been having these symptoms?: 5-7 days  Primary Reason for Visit (Submitted on 2024)  What is the primary reason for your visit?: Other

## 2024-05-15 ENCOUNTER — OFFICE VISIT (OUTPATIENT)
Age: 64
End: 2024-05-15
Payer: COMMERCIAL

## 2024-05-15 VITALS
BODY MASS INDEX: 30.21 KG/M2 | DIASTOLIC BLOOD PRESSURE: 88 MMHG | OXYGEN SATURATION: 98 % | WEIGHT: 160 LBS | HEIGHT: 61 IN | TEMPERATURE: 97.7 F | HEART RATE: 75 BPM | SYSTOLIC BLOOD PRESSURE: 133 MMHG

## 2024-05-15 DIAGNOSIS — K57.20 DIVERTICULITIS OF COLON WITH PERFORATION: Primary | ICD-10-CM

## 2024-05-15 DIAGNOSIS — Z98.890 STATUS POST SURGERY: ICD-10-CM

## 2024-05-15 PROCEDURE — 99024 POSTOP FOLLOW-UP VISIT: CPT | Performed by: STUDENT IN AN ORGANIZED HEALTH CARE EDUCATION/TRAINING PROGRAM

## 2024-05-15 NOTE — PROGRESS NOTES
Colorectal Surgery Followup Note    ID:  Charity Mcclure;   : 1960  DATE OF VISIT: 5/15/2024    Chief Complaint  Post-op (Robotic Rodriguez Colostomy Closure 2024)       Subjective     Mrs Mcclure is status post robotic Rodriguez's colostomy closure.  She is doing well.  Denies any nausea or vomiting.  She denies any pain.  She reports passing flatus and having bowel movements.  Denies any fevers or chills.  Colostomy site completely closed and healed     Exam  General:  No acute distress  Head: Normocephalic, atraumatic  Neuro: Alert and oriented     Abdomen:  Soft, non-tender, non-distended, no hernias, colostomy site well healed and closed     Assessment  -64-year-old female with Rodriguez colostomy status post robotic closure     Plan / Recommendations  -I am very pleased with her recovery   -Discussed postsurgical limitation including lifting weight and extraneous exercise. Ok to resume activities and lifting after 6/3/2024.   - Colonoscopy in 5 years   -Follow-up ANNA Vasquez MD  Colon and Rectal Surgery   [facility]

## (undated) DEVICE — SUT PDS 3/0 PLS MONO 1X27IN SH VIL PDP316H

## (undated) DEVICE — DRAPE,UTILITY,XL,4/PK,STERILE: Brand: MEDLINE

## (undated) DEVICE — INSUFFLATION TUBING SET, ENDOFLATOR 50: Brand: N.A.

## (undated) DEVICE — SYR LUERLOK 50ML

## (undated) DEVICE — LAPAROVUE VISIBILITY SYSTEM LAPAROSCOPIC SOLUTIONS: Brand: LAPAROVUE

## (undated) DEVICE — COVADERM: Brand: DEROYAL

## (undated) DEVICE — ANTIBACTERIAL VIOLET BRAIDED (POLYGLACTIN 910), SYNTHETIC ABSORBABLE SUTURE: Brand: COATED VICRYL

## (undated) DEVICE — ADHS LIQ MASTISOL 2/3ML

## (undated) DEVICE — INTENDED FOR TISSUE SEPARATION, AND OTHER PROCEDURES THAT REQUIRE A SHARP SURGICAL BLADE TO PUNCTURE OR CUT.: Brand: BARD-PARKER ® CARBON RIB-BACK BLADES

## (undated) DEVICE — 2, DISPOSABLE SUCTION/IRRIGATOR WITH DISPOSABLE TIP: Brand: STRYKEFLOW

## (undated) DEVICE — SUT MNCRYL 3/0 PS2 18IN MCP497G

## (undated) DEVICE — COLUMN DRAPE

## (undated) DEVICE — BLANKT WARM UPPR/BDY ARM/OUT 57X196CM

## (undated) DEVICE — SOL IRRIG NACL 1000ML

## (undated) DEVICE — 3M™ IOBAN™ 2 ANTIMICROBIAL INCISE DRAPE 6650EZ: Brand: IOBAN™ 2

## (undated) DEVICE — TBG IRRI TUR Y/TYP NONVENT 98IN LF

## (undated) DEVICE — PK GYN LAPAROSCOPY 50

## (undated) DEVICE — VESSEL SEALER EXTEND: Brand: ENDOWRIST

## (undated) DEVICE — YANKAUER,BULB TIP,W/O VENT,RIGID,STERILE: Brand: MEDLINE

## (undated) DEVICE — SUT VIC 0 UR6 27IN VCP603H

## (undated) DEVICE — PENCL HND ROCKRSWTCH HOLSTR EZ CLEAN TP CRD 10FT

## (undated) DEVICE — SLV SCD CALF HEMOFORCE DVT THERP REPROC MD

## (undated) DEVICE — COVER,TABLE,44X90,STERILE: Brand: MEDLINE

## (undated) DEVICE — SOLUTION,WATER,IRRIGATION,1000ML,STERILE: Brand: MEDLINE

## (undated) DEVICE — PK ENDO GI 50

## (undated) DEVICE — TBG INSUFF MALE L/L W 12MM CON: Brand: MEDLINE INDUSTRIES, INC.

## (undated) DEVICE — SUT 1/0 27 PDS II VIO MONO CT Z353H

## (undated) DEVICE — SEAL

## (undated) DEVICE — KT SURG TURNOVER 050

## (undated) DEVICE — STAPLER, SKIN, 35W, A: Brand: MEDLINE INDUSTRIES, INC.

## (undated) DEVICE — SHEET,DRAPE,53X77,STERILE: Brand: MEDLINE

## (undated) DEVICE — SUREFORM 45 RELOAD GREEN
Type: IMPLANTABLE DEVICE | Site: ABDOMEN | Status: NON-FUNCTIONAL
Brand: SUREFORM

## (undated) DEVICE — SUCTION IRRIGATOR: Brand: ENDOWRIST

## (undated) DEVICE — COVER,MAYO STAND,STERILE: Brand: MEDLINE

## (undated) DEVICE — MEDIUM-LARGE LIGATION CLIPS 6 CLIPS/CART
Type: IMPLANTABLE DEVICE | Site: ABDOMEN | Status: NON-FUNCTIONAL
Brand: VAS-Q-CLIP

## (undated) DEVICE — ENDOPATH XCEL WITH OPTIVIEW TECHNOLOGY BLADELESS TROCARS WITH STABILITY SLEEVES: Brand: ENDOPATH XCEL OPTIVIEW

## (undated) DEVICE — ANTIBACTERIAL UNDYED BRAIDED (POLYGLACTIN 910), SYNTHETIC ABSORBABLE SUTURE: Brand: COATED VICRYL

## (undated) DEVICE — DRN WND EVAC BULB 100CC

## (undated) DEVICE — TIP COVER ACCESSORY

## (undated) DEVICE — DRAPE SHEET ULTRAGARD: Brand: MEDLINE

## (undated) DEVICE — LEGGINGS, PAIR, 33X51 XL, STERILE: Brand: MEDLINE

## (undated) DEVICE — ST TBG AIRSEAL FLTR TRI LUM

## (undated) DEVICE — CVR HNDL LT CAM LB54

## (undated) DEVICE — SUT MNCRYL PLS ANTIB UD 4/0 PS2 18IN

## (undated) DEVICE — THE STERILE CAMERA HANDLE COVER IS FOR USE WITH THE STERIS SURGICAL LIGHTING AND VISUALIZATION SYSTEMS.

## (undated) DEVICE — BLADELESS OBTURATOR: Brand: WECK VISTA

## (undated) DEVICE — TROC BLADLES ANCHORPORT/OPTI LP 12X120MM 1P/U

## (undated) DEVICE — CVR HNDL LT SURG ACCSSRY BLU STRL

## (undated) DEVICE — REDUCER: Brand: ENDOWRIST

## (undated) DEVICE — CONTAINER,SPECIMEN,OR STERILE,4OZ: Brand: MEDLINE

## (undated) DEVICE — COUNT NDL MAG XLG

## (undated) DEVICE — PASS SUT PRO BARIATRIC XL W/TROC SWABS

## (undated) DEVICE — GOWN,REINFRCE,POLY,ECLIPSE,SLV,3XLG: Brand: MEDLINE

## (undated) DEVICE — DRSNG WND GZ PAD BORDERED 4X8IN STRL

## (undated) DEVICE — LAPAROSCOPIC ACCESS SYSTEM: Brand: ALEXIS LAPAROSCOPIC SYSTEM

## (undated) DEVICE — ARM DRAPE

## (undated) DEVICE — MANIP UTER RUMI 2 KOH EFFICIENT 3CM BL

## (undated) DEVICE — THE STERILE LIGHT HANDLE COVER IS USED WITH STERIS SURGICAL LIGHTING AND VISUALIZATION SYSTEMS.

## (undated) DEVICE — 450 ML BOTTLE OF 0.05% CHLORHEXIDINE GLUCONATE IN 99.95% STERILE WATER FOR IRRIGATION, USP AND APPLICATOR.: Brand: IRRISEPT ANTIMICROBIAL WOUND LAVAGE

## (undated) DEVICE — DBD-DRAPE,LAP,CHOLE,W/TROUGHS,STERILE: Brand: MEDLINE

## (undated) DEVICE — 3M™ STERI-STRIP™ REINFORCED ADHESIVE SKIN CLOSURES, R1547, 1/2 IN X 4 IN (12 MM X 100 MM), 6 STRIPS/ENVELOPE: Brand: 3M™ STERI-STRIP™

## (undated) DEVICE — NDL HYPO PRECISIONGLIDE REG 22G 1 1/2

## (undated) DEVICE — IRRIGATOR BULB ASEPTO 60CC STRL

## (undated) DEVICE — PK POSTN TRENGUARD450 PROC

## (undated) DEVICE — GLOVE,SURG,SENSICARE SLT,LF,PF,6.5: Brand: MEDLINE

## (undated) DEVICE — ADHS SKIN PREMIERPRO EXOFIN TOPICAL HI/VISC .5ML

## (undated) DEVICE — STAPLER 60 RELOAD WHITE
Type: IMPLANTABLE DEVICE | Status: NON-FUNCTIONAL
Brand: SUREFORM

## (undated) DEVICE — 40580 - THE PINK PAD - ADVANCED TRENDELENBURG POSITIONING KIT: Brand: 40580 - THE PINK PAD - ADVANCED TRENDELENBURG POSITIONING KIT

## (undated) DEVICE — GOWN,REINFORCE,POLY,SIRUS,BREATH SLV,XLG: Brand: MEDLINE

## (undated) DEVICE — STAPLER 60: Brand: SUREFORM

## (undated) DEVICE — GLV SURG SIGNATURE ESSENTIAL PF LTX SZ7.5

## (undated) DEVICE — BLAKE SILICONE DRAIN, 19 FR ROUND, HUBLESS WITH 1/4" BENDABLE TROCAR: Brand: BLAKE

## (undated) DEVICE — SPNG LAP PREWSH SFTPK 18X18IN STRL PK/5

## (undated) DEVICE — UNDRGLV SURG BIOGEL PIMICROINDICATOR SYNTH SZ7 LF STRL

## (undated) DEVICE — DRSNG WND BORDR/ADHS NONADHR/GZ LF 4X4IN STRL

## (undated) DEVICE — TOTAL TRAY, DB, 100% SILI FOLEY, 16FR 10: Brand: MEDLINE

## (undated) DEVICE — MARKR SKIN W/RULR

## (undated) DEVICE — CANNULA SEAL

## (undated) DEVICE — TOWEL,OR,DSP,ST,NATURAL,DLX,4/PK,20PK/CS: Brand: MEDLINE

## (undated) DEVICE — SUT VIC COAT 3/0 WO/NDL 18IN

## (undated) DEVICE — SUT ETHLN 2/0 PS 18IN 585H

## (undated) DEVICE — TUBING, SUCTION, 1/4" X 12', STRAIGHT: Brand: MEDLINE

## (undated) DEVICE — POOLE SUCTION INSTRUMENT WITH REMOVABLE SHEATH: Brand: POOLE

## (undated) DEVICE — PAD, GROUNDING, UNIVERSAL, SPLIT, 9': Brand: MEDLINE

## (undated) DEVICE — ENDOPOUCH RETRIEVER SPECIMEN RETRIEVAL BAGS: Brand: ENDOPOUCH RETRIEVER

## (undated) DEVICE — TROC BLADLES ANCHORPORT/OPTI LP 5X120MM 1P/U

## (undated) DEVICE — MANIP UTER RUMI TP 6.7MM 6CM WHT

## (undated) DEVICE — SINGLE-USE BIOPSY FORCEPS: Brand: RADIAL JAW 4